# Patient Record
Sex: FEMALE | Race: WHITE | Employment: FULL TIME | URBAN - METROPOLITAN AREA
[De-identification: names, ages, dates, MRNs, and addresses within clinical notes are randomized per-mention and may not be internally consistent; named-entity substitution may affect disease eponyms.]

---

## 2018-06-28 DIAGNOSIS — R00.2 HEART PALPITATIONS: ICD-10-CM

## 2018-06-28 DIAGNOSIS — R00.0 SINUS TACHYCARDIA: ICD-10-CM

## 2018-06-28 RX ORDER — METOPROLOL SUCCINATE 25 MG/1
25 TABLET, EXTENDED RELEASE ORAL NIGHTLY
Qty: 90 TABLET | Refills: 3 | Status: SHIPPED | OUTPATIENT
Start: 2018-06-28 | End: 2018-09-11 | Stop reason: SDUPTHER

## 2018-08-07 ENCOUNTER — OFFICE VISIT (OUTPATIENT)
Dept: FAMILY MEDICINE CLINIC | Age: 43
End: 2018-08-07
Payer: COMMERCIAL

## 2018-08-07 VITALS
HEART RATE: 82 BPM | SYSTOLIC BLOOD PRESSURE: 108 MMHG | WEIGHT: 135.6 LBS | OXYGEN SATURATION: 99 % | DIASTOLIC BLOOD PRESSURE: 60 MMHG | BODY MASS INDEX: 24.02 KG/M2

## 2018-08-07 DIAGNOSIS — R53.82 CHRONIC FATIGUE: ICD-10-CM

## 2018-08-07 DIAGNOSIS — F51.01 PRIMARY INSOMNIA: Primary | ICD-10-CM

## 2018-08-07 DIAGNOSIS — H02.9 LESION OF UPPER EYELID: ICD-10-CM

## 2018-08-07 PROCEDURE — G8427 DOCREV CUR MEDS BY ELIG CLIN: HCPCS | Performed by: FAMILY MEDICINE

## 2018-08-07 PROCEDURE — 99213 OFFICE O/P EST LOW 20 MIN: CPT | Performed by: FAMILY MEDICINE

## 2018-08-07 PROCEDURE — G8420 CALC BMI NORM PARAMETERS: HCPCS | Performed by: FAMILY MEDICINE

## 2018-08-07 PROCEDURE — 4004F PT TOBACCO SCREEN RCVD TLK: CPT | Performed by: FAMILY MEDICINE

## 2018-08-07 RX ORDER — TRAZODONE HYDROCHLORIDE 300 MG/1
300 TABLET ORAL NIGHTLY
Qty: 90 TABLET | Refills: 5 | Status: SHIPPED | OUTPATIENT
Start: 2018-08-07 | End: 2018-11-12 | Stop reason: SDUPTHER

## 2018-08-07 ASSESSMENT — PATIENT HEALTH QUESTIONNAIRE - PHQ9
SUM OF ALL RESPONSES TO PHQ QUESTIONS 1-9: 0
2. FEELING DOWN, DEPRESSED OR HOPELESS: 0
SUM OF ALL RESPONSES TO PHQ9 QUESTIONS 1 & 2: 0
1. LITTLE INTEREST OR PLEASURE IN DOING THINGS: 0
SUM OF ALL RESPONSES TO PHQ QUESTIONS 1-9: 0
SUM OF ALL RESPONSES TO PHQ QUESTIONS 1-9: 0
SUM OF ALL RESPONSES TO PHQ9 QUESTIONS 1 & 2: 0
1. LITTLE INTEREST OR PLEASURE IN DOING THINGS: 0
2. FEELING DOWN, DEPRESSED OR HOPELESS: 0

## 2018-08-08 ASSESSMENT — ENCOUNTER SYMPTOMS
PHOTOPHOBIA: 0
EYE REDNESS: 0
SHORTNESS OF BREATH: 0
DIARRHEA: 0
BLOOD IN STOOL: 0
EYE ITCHING: 0
CONSTIPATION: 0
VOMITING: 0
CHEST TIGHTNESS: 0
CHOKING: 0
COLOR CHANGE: 0
COUGH: 0
EYE PAIN: 0
WHEEZING: 0
SINUS PRESSURE: 0
SORE THROAT: 0
RECTAL PAIN: 0
ABDOMINAL DISTENTION: 0
FACIAL SWELLING: 0
STRIDOR: 0
VOICE CHANGE: 0
RHINORRHEA: 0
APNEA: 0
TROUBLE SWALLOWING: 0
ANAL BLEEDING: 0
NAUSEA: 0
ABDOMINAL PAIN: 0
EYE DISCHARGE: 0
BACK PAIN: 0

## 2018-08-08 NOTE — PROGRESS NOTES
control/ protection: Injection     Other Topics Concern    Not on file     Social History Narrative    No narrative on file       Family History   Problem Relation Age of Onset    Migraines Mother     High Blood Pressure Mother     Migraines Son     Cancer Father     Heart Disease Father     Cancer Sister        Current Outpatient Prescriptions on File Prior to Visit   Medication Sig Dispense Refill    metoprolol succinate (TOPROL XL) 25 MG extended release tablet Take 1 tablet by mouth nightly 90 tablet 3    albuterol sulfate HFA (PROVENTIL HFA) 108 (90 Base) MCG/ACT inhaler Inhale 2 puffs into the lungs every 6 hours as needed for Wheezing 1 Inhaler 5    medroxyPROGESTERone (DEPO-PROVERA) 150 MG/ML injection Inject 150 mg into the muscle once. Every 12 weeks       No current facility-administered medications on file prior to visit. No Known Allergies    I have reviewed her allergies, medications, problem list, medical, social and family history and have updated as needed in the electronic medical record. Objective:     Physical Exam   Constitutional: She is oriented to person, place, and time. She appears well-developed and well-nourished. No distress. HENT:   Head: Normocephalic and atraumatic. Right Ear: External ear normal.   Left Ear: External ear normal.   Nose: Nose normal.   Mouth/Throat: Oropharynx is clear and moist. No oropharyngeal exudate. Eyes: Conjunctivae and EOM are normal. Pupils are equal, round, and reactive to light. Right eye exhibits no discharge. Left eye exhibits no discharge. No scleral icterus. Neck: Normal range of motion. Neck supple. No JVD present. No tracheal deviation present. No thyromegaly present. Cardiovascular: Normal rate, regular rhythm, normal heart sounds and intact distal pulses. Exam reveals no gallop and no friction rub. No murmur heard. Pulmonary/Chest: Effort normal and breath sounds normal. No stridor. No respiratory distress.  She

## 2018-08-29 ENCOUNTER — OFFICE VISIT (OUTPATIENT)
Dept: SURGERY | Age: 43
End: 2018-08-29
Payer: COMMERCIAL

## 2018-08-29 VITALS
BODY MASS INDEX: 23.92 KG/M2 | DIASTOLIC BLOOD PRESSURE: 70 MMHG | OXYGEN SATURATION: 99 % | HEIGHT: 63 IN | SYSTOLIC BLOOD PRESSURE: 110 MMHG | HEART RATE: 71 BPM | WEIGHT: 135 LBS

## 2018-08-29 DIAGNOSIS — H02.60 XANTHELASMA: Primary | ICD-10-CM

## 2018-08-29 PROCEDURE — 99204 OFFICE O/P NEW MOD 45 MIN: CPT | Performed by: PLASTIC SURGERY

## 2018-08-29 PROCEDURE — G8420 CALC BMI NORM PARAMETERS: HCPCS | Performed by: PLASTIC SURGERY

## 2018-08-29 PROCEDURE — 4004F PT TOBACCO SCREEN RCVD TLK: CPT | Performed by: PLASTIC SURGERY

## 2018-08-29 PROCEDURE — G8427 DOCREV CUR MEDS BY ELIG CLIN: HCPCS | Performed by: PLASTIC SURGERY

## 2018-08-29 NOTE — PROGRESS NOTES
damage to surrounding structures, fluid collections, asymmetry, and need for further procedures. All of Her questions were answered to their satisfaction and She agrees to proceed with the procedure. I have discussed with the patient that they should make every attempt to follow-up within this time interval in the event that the pathology or radiographic findings require further attention such as surgical or other medical intervention. They voiced complete understanding. The patient was educated to keep the bandage in place and apply bacitracin to the wound site BID. OK to shower at this time. Advised the patient that they can allow soap and water to rinse of the wound site while showering. Once they are done in the shower they are to pat dry the incision site with a clean paper towel. No baths, hot tubs or soaking of the wound site at this time. Pt voices understanding. Photos obtained    I attest that the patient was seen and examined by me, and concur with the documentation above. I agree with the assessment and the plan outlined. This document is generated, in part, by voice recognition software and thus  syntax and grammatical errors are possible.     Madison Arango  12:11 PM  8/29/2018

## 2018-09-06 ENCOUNTER — TELEPHONE (OUTPATIENT)
Dept: SURGERY | Age: 43
End: 2018-09-06

## 2018-09-07 ENCOUNTER — ANESTHESIA EVENT (OUTPATIENT)
Dept: OPERATING ROOM | Age: 43
End: 2018-09-07
Payer: COMMERCIAL

## 2018-09-07 ASSESSMENT — LIFESTYLE VARIABLES: SMOKING_STATUS: 1

## 2018-09-07 NOTE — ANESTHESIA PRE PROCEDURE
Department of Anesthesiology  Preprocedure Note       Name:  Jesus Ross   Age:  44 y.o.  :  1975                                          MRN:  [id number]         Date:  2018      Surgeon: Mabel Fermin):  Sheyla Deleon MD    Procedure: Procedure(s):  ER YAG LASER ABLATION RIGHT UPPER EYELID SYMPTOMATIC    Medications prior to admission:   Prior to Admission medications    Medication Sig Start Date End Date Taking? Authorizing Provider   traZODone (DESYREL) 300 MG tablet Take 1 tablet by mouth nightly 18   Emmy Penrodri, DO   metoprolol succinate (TOPROL XL) 25 MG extended release tablet Take 1 tablet by mouth nightly 18   Cheyenne Duran MD   albuterol sulfate HFA (PROVENTIL HFA) 108 (90 Base) MCG/ACT inhaler Inhale 2 puffs into the lungs every 6 hours as needed for Wheezing 11/3/17   Emmy Brenna, DO   medroxyPROGESTERone (DEPO-PROVERA) 150 MG/ML injection Inject 150 mg into the muscle once. Every 12 weeks    Historical Provider, MD       Current medications:    No current facility-administered medications for this encounter. Current Outpatient Prescriptions   Medication Sig Dispense Refill    traZODone (DESYREL) 300 MG tablet Take 1 tablet by mouth nightly 90 tablet 5    metoprolol succinate (TOPROL XL) 25 MG extended release tablet Take 1 tablet by mouth nightly 90 tablet 3    albuterol sulfate HFA (PROVENTIL HFA) 108 (90 Base) MCG/ACT inhaler Inhale 2 puffs into the lungs every 6 hours as needed for Wheezing 1 Inhaler 5    medroxyPROGESTERone (DEPO-PROVERA) 150 MG/ML injection Inject 150 mg into the muscle once.  Every 12 weeks         Allergies:  No Known Allergies    Problem List:    Patient Active Problem List   Diagnosis Code    Shoulder bursitis M75.50    Impingement syndrome of shoulder M75.40    Shoulder pain M25.519    Biceps tendonitis M75.20    Worsening headaches R51    Back pain M54.9    Chronic migraine G43.709    Protruded lumbar disc M51.26    DDD (degenerative disc disease), lumbar M51.36    Neural foraminal stenosis of lumbar spine M99.83    Degenerative Osteoarthritis of lumbar spine M47.816    Sacroiliitis  M46.1    Facet syndrome, lumbar (HCC) M46.96    Lumbar radiculopathy M54.16    Osteoarthritis of right hip M16.0    Acetabular labrum tear S73.199A    Lumbar spinal stenosis M48.061       Past Medical History:        Diagnosis Date    Bulging lumbar disc     Compression of nerve     Headache(784.0)     Insomnia     Tachycardia     takes metoprolol for irreg HR        Past Surgical History:        Procedure Laterality Date    BICEPS TENDON REPAIR Right     BREAST ENHANCEMENT SURGERY Bilateral     BREAST SURGERY  augmentation    KNEE ARTHROSCOPY  right    NERVE BLOCK Right 10 12 2015    lumbar right transforaminal nerve block #1 l4-5 l5-s1    NERVE BLOCK Right 10 22 2015    Transforaminal lumbar nerve block #2    NERVE BLOCK Right 11 02 2015    transforaminal nerve block right lumbar #3    NERVE BLOCK  11/11/15    sacroiliac joint right #1    NERVE BLOCK  11/18/15    sacroiliac joint right #2    NERVE BLOCK  11/25/15    sacroiliac joint right #3    NERVE BLOCK Right 1 18 15    hip inj #1    NERVE BLOCK Right 02/01/16    hip injection #2    NERVE BLOCK Bilateral 07/25/2016    Bilateral paravertebral facet lumbar #1    NERVE BLOCK Bilateral 08/17/2016    lumbar paravertebral facet #2    OK RECONSTRUCT PROX HUMERAL IMPLANT Right 5/14/2014    Arthroplasty, Shoulder    ROTATOR CUFF REPAIR Right        Social History:    Social History   Substance Use Topics    Smoking status: Current Every Day Smoker     Packs/day: 0.25     Years: 15.00     Types: Cigarettes    Smokeless tobacco: Never Used      Comment: 1 pack last a week    Alcohol use 0.0 oz/week      Comment: socially.  1 cup coffee per day                                Ready to quit: Not Answered  Counseling given: Not Answered      Vital Signs (Current):   Vitals: Rhythm: regular  Rate: normal           Beta Blocker:  Dose within 24 Hrs         Neuro/Psych:   (+) neuromuscular disease (lumbar radiculopathy):, headaches: migraine headaches,             GI/Hepatic/Renal:        (-) no morbid obesity       Endo/Other:    (+) : arthritis: OA., .                 Abdominal:         (-) obese     Vascular: negative vascular ROS. Anesthesia Plan      MAC     ASA 2       Induction: intravenous. MIPS: Postoperative opioids intended and Prophylactic antiemetics administered. Anesthetic plan and risks discussed with patient. Plan discussed with CRNA. PAT Chart Review:  Chart reviewed per routine on September 7, 2018 at 9:56 AM by Nel Sprague MD.  Above represents information available via shared medical record including previous anesthesia history, drug and allergy history.   Confirmation of above and final plan per Day of Surgery (DOS) anesthesiologist.      Nel Sprague MD   9/7/2018

## 2018-09-10 ENCOUNTER — ANESTHESIA (OUTPATIENT)
Dept: OPERATING ROOM | Age: 43
End: 2018-09-10
Payer: COMMERCIAL

## 2018-09-10 ENCOUNTER — HOSPITAL ENCOUNTER (OUTPATIENT)
Age: 43
Setting detail: OUTPATIENT SURGERY
Discharge: HOME | End: 2018-09-10
Attending: PLASTIC SURGERY | Admitting: PLASTIC SURGERY
Payer: COMMERCIAL

## 2018-09-10 VITALS
SYSTOLIC BLOOD PRESSURE: 100 MMHG | WEIGHT: 135 LBS | RESPIRATION RATE: 14 BRPM | BODY MASS INDEX: 23.92 KG/M2 | TEMPERATURE: 98 F | OXYGEN SATURATION: 99 % | HEART RATE: 68 BPM | HEIGHT: 63 IN | DIASTOLIC BLOOD PRESSURE: 64 MMHG

## 2018-09-10 VITALS
RESPIRATION RATE: 10 BRPM | SYSTOLIC BLOOD PRESSURE: 98 MMHG | OXYGEN SATURATION: 99 % | DIASTOLIC BLOOD PRESSURE: 50 MMHG

## 2018-09-10 LAB — PREGNANCY TEST URINE, POC: NORMAL

## 2018-09-10 PROCEDURE — 6370000000 HC RX 637 (ALT 250 FOR IP): Performed by: PLASTIC SURGERY

## 2018-09-10 PROCEDURE — 7100000010 HC PHASE II RECOVERY - FIRST 15 MIN: Performed by: PLASTIC SURGERY

## 2018-09-10 PROCEDURE — 6360000002 HC RX W HCPCS: Performed by: NURSE ANESTHETIST, CERTIFIED REGISTERED

## 2018-09-10 PROCEDURE — 7100000011 HC PHASE II RECOVERY - ADDTL 15 MIN: Performed by: PLASTIC SURGERY

## 2018-09-10 PROCEDURE — 2500000003 HC RX 250 WO HCPCS: Performed by: NURSE ANESTHETIST, CERTIFIED REGISTERED

## 2018-09-10 PROCEDURE — 81025 URINE PREGNANCY TEST: CPT | Performed by: PLASTIC SURGERY

## 2018-09-10 PROCEDURE — 2580000003 HC RX 258: Performed by: ANESTHESIOLOGY

## 2018-09-10 PROCEDURE — 2709999900 HC NON-CHARGEABLE SUPPLY: Performed by: PLASTIC SURGERY

## 2018-09-10 PROCEDURE — 3600000013 HC SURGERY LEVEL 3 ADDTL 15MIN: Performed by: PLASTIC SURGERY

## 2018-09-10 PROCEDURE — 3700000001 HC ADD 15 MINUTES (ANESTHESIA): Performed by: PLASTIC SURGERY

## 2018-09-10 PROCEDURE — 3600000003 HC SURGERY LEVEL 3 BASE: Performed by: PLASTIC SURGERY

## 2018-09-10 PROCEDURE — 3700000000 HC ANESTHESIA ATTENDED CARE: Performed by: PLASTIC SURGERY

## 2018-09-10 PROCEDURE — 2500000003 HC RX 250 WO HCPCS: Performed by: PLASTIC SURGERY

## 2018-09-10 PROCEDURE — 17110 DESTRUCTION B9 LES UP TO 14: CPT | Performed by: PLASTIC SURGERY

## 2018-09-10 RX ORDER — ONDANSETRON 2 MG/ML
INJECTION INTRAMUSCULAR; INTRAVENOUS PRN
Status: DISCONTINUED | OUTPATIENT
Start: 2018-09-10 | End: 2018-09-10 | Stop reason: SDUPTHER

## 2018-09-10 RX ORDER — LIDOCAINE HYDROCHLORIDE AND EPINEPHRINE 20; 5 MG/ML; UG/ML
INJECTION, SOLUTION EPIDURAL; INFILTRATION; INTRACAUDAL; PERINEURAL PRN
Status: DISCONTINUED | OUTPATIENT
Start: 2018-09-10 | End: 2018-09-10 | Stop reason: HOSPADM

## 2018-09-10 RX ORDER — BACITRACIN 500 [USP'U]/G
OINTMENT OPHTHALMIC PRN
Status: DISCONTINUED | OUTPATIENT
Start: 2018-09-10 | End: 2018-09-10 | Stop reason: HOSPADM

## 2018-09-10 RX ORDER — BACITRACIN 500 [USP'U]/G
OINTMENT OPHTHALMIC 3 TIMES DAILY
Qty: 1 TUBE | Refills: 2 | Status: SHIPPED | OUTPATIENT
Start: 2018-09-10 | End: 2018-09-20

## 2018-09-10 RX ORDER — MORPHINE SULFATE 2 MG/ML
1 INJECTION, SOLUTION INTRAMUSCULAR; INTRAVENOUS EVERY 5 MIN PRN
Status: DISCONTINUED | OUTPATIENT
Start: 2018-09-10 | End: 2018-09-10 | Stop reason: HOSPADM

## 2018-09-10 RX ORDER — FENTANYL CITRATE 50 UG/ML
50 INJECTION, SOLUTION INTRAMUSCULAR; INTRAVENOUS EVERY 5 MIN PRN
Status: DISCONTINUED | OUTPATIENT
Start: 2018-09-10 | End: 2018-09-10 | Stop reason: HOSPADM

## 2018-09-10 RX ORDER — SODIUM CHLORIDE, SODIUM LACTATE, POTASSIUM CHLORIDE, CALCIUM CHLORIDE 600; 310; 30; 20 MG/100ML; MG/100ML; MG/100ML; MG/100ML
INJECTION, SOLUTION INTRAVENOUS CONTINUOUS
Status: DISCONTINUED | OUTPATIENT
Start: 2018-09-10 | End: 2018-09-10 | Stop reason: HOSPADM

## 2018-09-10 RX ORDER — DIPHENHYDRAMINE HYDROCHLORIDE 50 MG/ML
12.5 INJECTION INTRAMUSCULAR; INTRAVENOUS
Status: DISCONTINUED | OUTPATIENT
Start: 2018-09-10 | End: 2018-09-10 | Stop reason: HOSPADM

## 2018-09-10 RX ORDER — TETRACAINE HYDROCHLORIDE 5 MG/ML
SOLUTION OPHTHALMIC PRN
Status: DISCONTINUED | OUTPATIENT
Start: 2018-09-10 | End: 2018-09-10 | Stop reason: HOSPADM

## 2018-09-10 RX ORDER — SODIUM CHLORIDE 0.9 % (FLUSH) 0.9 %
10 SYRINGE (ML) INJECTION PRN
Status: DISCONTINUED | OUTPATIENT
Start: 2018-09-10 | End: 2018-09-10 | Stop reason: HOSPADM

## 2018-09-10 RX ORDER — PROPOFOL 10 MG/ML
INJECTION, EMULSION INTRAVENOUS PRN
Status: DISCONTINUED | OUTPATIENT
Start: 2018-09-10 | End: 2018-09-10 | Stop reason: SDUPTHER

## 2018-09-10 RX ORDER — PROPOFOL 10 MG/ML
INJECTION, EMULSION INTRAVENOUS CONTINUOUS PRN
Status: DISCONTINUED | OUTPATIENT
Start: 2018-09-10 | End: 2018-09-10 | Stop reason: SDUPTHER

## 2018-09-10 RX ORDER — LABETALOL HYDROCHLORIDE 5 MG/ML
5 INJECTION, SOLUTION INTRAVENOUS EVERY 10 MIN PRN
Status: DISCONTINUED | OUTPATIENT
Start: 2018-09-10 | End: 2018-09-10 | Stop reason: HOSPADM

## 2018-09-10 RX ORDER — OXYCODONE HYDROCHLORIDE AND ACETAMINOPHEN 5; 325 MG/1; MG/1
1 TABLET ORAL EVERY 4 HOURS PRN
Status: DISCONTINUED | OUTPATIENT
Start: 2018-09-10 | End: 2018-09-10 | Stop reason: HOSPADM

## 2018-09-10 RX ORDER — HYDRALAZINE HYDROCHLORIDE 20 MG/ML
5 INJECTION INTRAMUSCULAR; INTRAVENOUS EVERY 10 MIN PRN
Status: DISCONTINUED | OUTPATIENT
Start: 2018-09-10 | End: 2018-09-10 | Stop reason: HOSPADM

## 2018-09-10 RX ORDER — FENTANYL CITRATE 50 UG/ML
INJECTION, SOLUTION INTRAMUSCULAR; INTRAVENOUS PRN
Status: DISCONTINUED | OUTPATIENT
Start: 2018-09-10 | End: 2018-09-10 | Stop reason: SDUPTHER

## 2018-09-10 RX ORDER — MEPERIDINE HYDROCHLORIDE 25 MG/ML
12.5 INJECTION INTRAMUSCULAR; INTRAVENOUS; SUBCUTANEOUS EVERY 5 MIN PRN
Status: DISCONTINUED | OUTPATIENT
Start: 2018-09-10 | End: 2018-09-10 | Stop reason: HOSPADM

## 2018-09-10 RX ORDER — PROMETHAZINE HYDROCHLORIDE 25 MG/ML
25 INJECTION, SOLUTION INTRAMUSCULAR; INTRAVENOUS
Status: DISCONTINUED | OUTPATIENT
Start: 2018-09-10 | End: 2018-09-10 | Stop reason: HOSPADM

## 2018-09-10 RX ORDER — MIDAZOLAM HYDROCHLORIDE 1 MG/ML
INJECTION INTRAMUSCULAR; INTRAVENOUS PRN
Status: DISCONTINUED | OUTPATIENT
Start: 2018-09-10 | End: 2018-09-10 | Stop reason: SDUPTHER

## 2018-09-10 RX ORDER — LIDOCAINE HYDROCHLORIDE 20 MG/ML
INJECTION, SOLUTION INFILTRATION; PERINEURAL PRN
Status: DISCONTINUED | OUTPATIENT
Start: 2018-09-10 | End: 2018-09-10 | Stop reason: SDUPTHER

## 2018-09-10 RX ORDER — SODIUM CHLORIDE 0.9 % (FLUSH) 0.9 %
10 SYRINGE (ML) INJECTION EVERY 12 HOURS SCHEDULED
Status: DISCONTINUED | OUTPATIENT
Start: 2018-09-10 | End: 2018-09-10 | Stop reason: HOSPADM

## 2018-09-10 RX ADMIN — SODIUM CHLORIDE, POTASSIUM CHLORIDE, SODIUM LACTATE AND CALCIUM CHLORIDE: 600; 310; 30; 20 INJECTION, SOLUTION INTRAVENOUS at 08:54

## 2018-09-10 RX ADMIN — FENTANYL CITRATE 50 MCG: 50 INJECTION, SOLUTION INTRAMUSCULAR; INTRAVENOUS at 10:25

## 2018-09-10 RX ADMIN — PROPOFOL 20 MG: 10 INJECTION, EMULSION INTRAVENOUS at 10:19

## 2018-09-10 RX ADMIN — MIDAZOLAM 2 MG: 1 INJECTION INTRAMUSCULAR; INTRAVENOUS at 10:18

## 2018-09-10 RX ADMIN — PROPOFOL 75 MCG/KG/MIN: 10 INJECTION, EMULSION INTRAVENOUS at 10:19

## 2018-09-10 RX ADMIN — LIDOCAINE HYDROCHLORIDE 5 MG: 20 INJECTION, SOLUTION INFILTRATION; PERINEURAL at 10:19

## 2018-09-10 RX ADMIN — FENTANYL CITRATE 50 MCG: 50 INJECTION, SOLUTION INTRAMUSCULAR; INTRAVENOUS at 10:19

## 2018-09-10 RX ADMIN — ONDANSETRON HYDROCHLORIDE 4 MG: 2 INJECTION, SOLUTION INTRAMUSCULAR; INTRAVENOUS at 10:18

## 2018-09-10 ASSESSMENT — PULMONARY FUNCTION TESTS
PIF_VALUE: 0

## 2018-09-10 ASSESSMENT — PAIN SCALES - GENERAL
PAINLEVEL_OUTOF10: 0

## 2018-09-10 ASSESSMENT — PAIN - FUNCTIONAL ASSESSMENT: PAIN_FUNCTIONAL_ASSESSMENT: 0-10

## 2018-09-10 NOTE — OP NOTE
[address]                                 OPERATIVE REPORT    PATIENT NAME: Rome Lipoma                    :        1975  MED REC NO:   [id number]                            ROOM:  ACCOUNT NO:   [de-identified]                           ADMIT DATE: 09/10/2018  PROVIDER:     Carmina Monk MD    DATE OF PROCEDURE:  09/10/2018    PREOPERATIVE DIAGNOSIS:  Right eyelid upper xanthelasma. POSTOPERATIVE DIAGNOSIS:  Right eyelid upper xanthelasma. PROCEDURE PERFORMED:  Erbium-YAG laser, destruction of right upper eyelids,  xanthelasma lesions measures 4 x 6 mm, margins are 1 mm defect, 6 x 8 mm,  final open wound 6 x 8 mm. ATTENDING SURGEON:  Neal Umanzor. Roxie Narvaez M.D.    Roberto Guillermo:  Thomas Montana PA-C. PA was required for the case due to  lack of adequately trained assistant; and was involved in prepping,  draping, retracting, dressing the patient. ANESTHESIA:  Monitored anesthesia care. PREOPERATIVE INDICATIONS:  The patient is a 69-year-old female with history  of a right upper eyelid xanthelasma causing heaviness to the upper eyelid. The patient elected to proceed with erbium-YAG destruction of the lesion. Risks, benefits, and alternatives were explained to the patient including  bleeding, scarring, infection, recurrence, and need for further surgery. She understood and elected to proceed. She was seen on the day of surgery,  marked, and all questions were answered. OPERATIVE PROCEDURE:  She was taken back to the operating room, placed in  the supine position. SCDs were on and functioning at the time of induction  of anesthesia. Preoperative antibiotics were given prior to incision. Laser safety protocols were in place after TetraVisc eye drops were placed  into the eyelids, ocular shields were then utilized. The area was then  infiltrated with 1% lidocaine with 1:100,000 epinephrine.

## 2018-09-11 ENCOUNTER — OFFICE VISIT (OUTPATIENT)
Dept: CARDIOLOGY CLINIC | Age: 43
End: 2018-09-11
Payer: COMMERCIAL

## 2018-09-11 VITALS
DIASTOLIC BLOOD PRESSURE: 60 MMHG | HEIGHT: 63 IN | WEIGHT: 139 LBS | BODY MASS INDEX: 24.63 KG/M2 | HEART RATE: 69 BPM | SYSTOLIC BLOOD PRESSURE: 124 MMHG

## 2018-09-11 DIAGNOSIS — Z86.79 HX OF SINUS TACHYCARDIA: ICD-10-CM

## 2018-09-11 DIAGNOSIS — Z72.0 TOBACCO USE: ICD-10-CM

## 2018-09-11 DIAGNOSIS — Z87.898 HISTORY OF PALPITATIONS: Primary | ICD-10-CM

## 2018-09-11 PROCEDURE — 99214 OFFICE O/P EST MOD 30 MIN: CPT | Performed by: INTERNAL MEDICINE

## 2018-09-11 PROCEDURE — G8427 DOCREV CUR MEDS BY ELIG CLIN: HCPCS | Performed by: INTERNAL MEDICINE

## 2018-09-11 PROCEDURE — 93000 ELECTROCARDIOGRAM COMPLETE: CPT | Performed by: INTERNAL MEDICINE

## 2018-09-11 PROCEDURE — 4004F PT TOBACCO SCREEN RCVD TLK: CPT | Performed by: INTERNAL MEDICINE

## 2018-09-11 PROCEDURE — G8420 CALC BMI NORM PARAMETERS: HCPCS | Performed by: INTERNAL MEDICINE

## 2018-09-11 RX ORDER — METOPROLOL SUCCINATE 25 MG/1
25 TABLET, EXTENDED RELEASE ORAL NIGHTLY
Qty: 30 TABLET | Refills: 11 | Status: SHIPPED | OUTPATIENT
Start: 2018-09-11 | End: 2018-11-02 | Stop reason: SDUPTHER

## 2018-09-11 NOTE — PROGRESS NOTES
OFFICE VISIT     PRIMARY CARE PHYSICIAN:      Juancarlos Staples DO       ALLERGIES / SENSITIVITIES:      No Known Allergies       REVIEWED MEDICATIONS:        Current Outpatient Prescriptions:     metoprolol succinate (TOPROL XL) 25 MG extended release tablet, Take 1 tablet by mouth nightly, Disp: 30 tablet, Rfl: 11    bacitracin 500 UNIT/GM ophthalmic ointment, Place into both eyes 3 times daily for 10 days Place over eyelid wound twice daily, Disp: 1 Tube, Rfl: 2    traZODone (DESYREL) 300 MG tablet, Take 1 tablet by mouth nightly, Disp: 90 tablet, Rfl: 5    albuterol sulfate HFA (PROVENTIL HFA) 108 (90 Base) MCG/ACT inhaler, Inhale 2 puffs into the lungs every 6 hours as needed for Wheezing, Disp: 1 Inhaler, Rfl: 5    medroxyPROGESTERone (DEPO-PROVERA) 150 MG/ML injection, Inject 150 mg into the muscle once. Every 12 weeks, Disp: , Rfl:       S: REASON FOR VISIT:       Chief Complaint   Patient presents with    Palpitations     9 month. Pt has no cardiac complaints today          History of Present Illness:       Office Visit for follow up of palpitations and Sinus tachycardia     Had \"Laser procedure done near right eye\"     Tye any exertional chest pain or short of breath   No palpitations, dizzy or syncope.    Active at home   No orthopnea   Try to watch diet   Compliant with all medications       Past Medical History:   Diagnosis Date    Bulging lumbar disc     Compression of nerve     Headache(784.0)     Insomnia     Tachycardia     takes metoprolol for irreg HR             Past Surgical History:   Procedure Laterality Date    BICEPS TENDON REPAIR Right     BREAST ENHANCEMENT SURGERY Bilateral     BREAST SURGERY  augmentation    KNEE ARTHROSCOPY  right    NERVE BLOCK Right 10 12 2015    lumbar right transforaminal nerve block #1 l4-5 l5-s1    NERVE BLOCK Right 10 22 2015    Transforaminal lumbar nerve block #2    NERVE BLOCK Right 11 02 2015    transforaminal nerve block right lumbar #3    NERVE BLOCK  11/11/15    sacroiliac joint right #1    NERVE BLOCK  11/18/15    sacroiliac joint right #2    NERVE BLOCK  11/25/15    sacroiliac joint right #3    NERVE BLOCK Right 1 18 15    hip inj #1    NERVE BLOCK Right 02/01/16    hip injection #2    NERVE BLOCK Bilateral 07/25/2016    Bilateral paravertebral facet lumbar #1    NERVE BLOCK Bilateral 08/17/2016    lumbar paravertebral facet #2    OTHER SURGICAL HISTORY Right 09/10/2018    laser upper eyelid    [place] OFFICE/OUTPT VISIT,PROCEDURE ONLY Right 9/10/2018    ER YAG LASER ABLATION RIGHT UPPER EYELID SYMPTOMATIC performed by Madison Arango MD at [facility] Right 5/14/2014    Arthroplasty, Shoulder    ROTATOR CUFF REPAIR Right           Family History   Problem Relation Age of Onset   [facility] Migraines Mother     High Blood Pressure Mother     Migraines Son     Cancer Father     Heart Disease Father     Cancer Sister           Social History   Substance Use Topics    Smoking status: Current Every Day Smoker     Packs/day: 0.25     Years: 15.00     Types: Cigarettes    Smokeless tobacco: Never Used      Comment: 1 pack last a week    Alcohol use 0.0 oz/week      Comment: socially.  1 cup coffee per day         Review of Systems:  HEENT: negative for acute visual symptoms or auditory problems, no dysphagia  Constitutional: negative for fever and chills, or significant weight loss  Respiratory: negative for cough, wheezing, or hemoptysis  Cardiovascular: negative for chest pain, palpitations, and dyspnea  Gastrointestinal: negative for abdominal pain, diarrhea, nausea and vomiting  Endocrine: Negative for polyuria and polydyspsia  Genitourinary:negative for dysuria and hematuria  Derm: negative for rash and skin lesion(s)  Neurological: negative for tingling, numbness, weakness, seizures and tremors  Endocrine: negative for polydipsia and polyuria  Musculoskeletal: negative for pain or tenderness  Psychiatric: negative for anxiety, depression, or suicidal ideations         O:  COMPLETE PHYSICAL EXAM:       /60   Pulse 69   Ht 5' 3\" (1.6 m)   Wt 139 lb (63 kg)   BMI 24.62 kg/m²       General:   Patient alert, comfortable, no distress. Appears stated age. HEENT:    Pupils equal, no icterus, no nasal drainage, tongue moist.Mild redness near inner canthus of right eye   Neck:              No masses, Thyroid not palpable. Chest:   Normal configuration, non tender. Lungs:   Clear to auscultation bilaterally, few scattered rhonchi. Cardiovascular:  Regular rhythm, No S3, , no palpable thrills, No elevated JVD, No carotid bruit. Abdomen:  Soft, Non tender, Bowel sounds normal,. Extremities:  No edema. Distal pulses palpable. No cyanosis, no clubbing. Skin:   Good turgor, warm and dry, no cyanosis. Musculoskeletal: No joint swelling or deformity. Neuro:   Cranial nerves grossly intact; No focal neurologic deficit. Psych:   Alert, good mood and effect. REVIEW OF DIAGNOSTIC TESTS:        Electrocardiogram: NSR., normal QTc interval              A/P:   ASSESSMENT / PLAN:    Ghulam Cui was seen today for palpitations. Diagnoses and all orders for this visit:    History of palpitations - Stable  -     EKG 12 Lead    Hx of sinus tachycardia  -Stable; try to wean off her BB; Try 12.5mg ONCE daily    Tobacco use  - 1/4 ppd; Counseled to quit smoking    Other orders  -     metoprolol succinate (TOPROL XL) 25 MG extended release tablet; Take 1 tablet by mouth nightly       Preventive Cardiology: Low cholesterol diet, regular exercise as tolerate, and gradual weight loss discussed. Monitor BP and heart rates. All questions answered about cardiac diagnoses and cardiac medications. Continue current medications. Compliance with medications and f/u with all physicians discussed. Risk factor modification based on risk profile discussed.    Call if any exertional chest pain, short of breath, dizzy or palpitations   Follow up in 9 months or earlier if needed.          [id number] [facility]  [address] [facility], [address] [facility]  [phone]

## 2018-09-24 ENCOUNTER — OFFICE VISIT (OUTPATIENT)
Dept: SURGERY | Age: 43
End: 2018-09-24

## 2018-09-24 VITALS
WEIGHT: 139 LBS | SYSTOLIC BLOOD PRESSURE: 106 MMHG | HEIGHT: 63 IN | BODY MASS INDEX: 24.63 KG/M2 | DIASTOLIC BLOOD PRESSURE: 68 MMHG

## 2018-09-24 DIAGNOSIS — H02.60 XANTHELASMA: Primary | ICD-10-CM

## 2018-09-24 PROCEDURE — 99024 POSTOP FOLLOW-UP VISIT: CPT | Performed by: PHYSICIAN ASSISTANT

## 2018-09-24 NOTE — PROGRESS NOTES
Subjective: Follow up today from  Erbium-YAG laser, destruction of right upper eyelids, xanthelasma. Denies fever, nausea, vomiting, leg pain or swelling, pain is absent. The pt states that they have  kept the wound site(s) covered with bacitracin. They state that their pain is absent. She voices no complaints at this time. Objective:    /68 (Site: Left Upper Arm, Position: Sitting, Cuff Size: Medium Adult)   Ht 5' 3\" (1.6 m)   Wt 139 lb (63 kg)   BMI 24.62 kg/m²       Wound: Clean, dry, and intact, no drainage. No signs of infection, wound healing well    Neuro- Sensation  intact to denny incisional site with light touch . Cranial nerves II-XII grossly intact.      Assessment:    Patient Active Problem List   Diagnosis    Shoulder bursitis    Impingement syndrome of shoulder    Shoulder pain    Biceps tendonitis    Worsening headaches    Back pain    Chronic migraine    Protruded lumbar disc    DDD (degenerative disc disease), lumbar    Neural foraminal stenosis of lumbar spine    Degenerative Osteoarthritis of lumbar spine    Sacroiliitis     Facet syndrome, lumbar ([address]    Lumbar radiculopathy    Osteoarthritis of right hip    Acetabular labrum tear    Lumbar spinal stenosis    Xanthelasma    Hx of sinus tachycardia       Labs: CBC:   Lab Results   Component Value Date    WBC 12.1 11/03/2017    RBC 4.51 11/03/2017    HGB 14.0 11/03/2017    HCT 41.6 11/03/2017    MCV 92.2 11/03/2017    MCH 31.0 11/03/2017    MCHC 33.7 11/03/2017    RDW 13.0 11/03/2017     11/03/2017    MPV 9.3 11/03/2017     BMP:    Lab Results   Component Value Date     11/03/2017    K 4.9 11/03/2017     11/03/2017    CO2 22 11/03/2017    BUN 12 11/03/2017    LABALBU 4.3 11/03/2017    LABALBU 4.3 09/13/2011    CREATININE 0.8 11/03/2017    CALCIUM 9.7 11/03/2017    GFRAA >60 11/03/2017    LABGLOM >60 11/03/2017    GLUCOSE 93 11/03/2017    GLUCOSE 90 09/13/2011         Pathology Report- None    Plan:       Dressing -None  Showering at this time -OK  No restrictions     Pt was instructed on scar massage  Scar Care Instructions      Sunscreen Recommendations for Scars   We recommend that all patients use a sunscreen when outside but especially on new scars (that are exposed to sunlight) for a year after their procedure.  The SPF should be at least 28. Follow the application directions on the bottle. Why is it so Important to Use Sunscreen on Scars?  A scar is new and more fragile than the surrounding skin.  If you do not use sunscreen, the scar line will react differently to the sun than the surrounding skin.  If you don't use sunscreen, the scar tissue will become darker than surrounding skin. This is a hyper-pigmented scar and will remain darker than the other skin.  After one year, the scar and surrounding skin should react equally to sun. Superficial Scar Massage   Scar massage desensitizes and reduces scar adhesions so skin glides freely.  Rub in a circular motion on and around the scar with firm, even pressure for 5 minutes four times per day    You can start scar massage once incision is completely healed and strong enough to handle the motion (usually 10 - 14 days post operatively).  You use lotion to do the scar massage to allow ease with motion over the scar and prevent friction at the area. Patient had no further questions. F/U PRN  Call office with concerns or signs of infection.     APRIL Nelson   1:16 PM  9/24/2018

## 2018-11-05 RX ORDER — METOPROLOL SUCCINATE 25 MG/1
25 TABLET, EXTENDED RELEASE ORAL NIGHTLY
Qty: 90 TABLET | Refills: 3 | Status: SHIPPED | OUTPATIENT
Start: 2018-11-05 | End: 2019-10-09 | Stop reason: SDUPTHER

## 2018-11-12 DIAGNOSIS — R53.82 CHRONIC FATIGUE: ICD-10-CM

## 2018-11-12 DIAGNOSIS — F51.01 PRIMARY INSOMNIA: ICD-10-CM

## 2018-11-14 RX ORDER — TRAZODONE HYDROCHLORIDE 300 MG/1
300 TABLET ORAL NIGHTLY
Qty: 90 TABLET | Refills: 5 | Status: SHIPPED | OUTPATIENT
Start: 2018-11-14 | End: 2019-02-07 | Stop reason: SDUPTHER

## 2019-02-07 ENCOUNTER — OFFICE VISIT (OUTPATIENT)
Dept: FAMILY MEDICINE CLINIC | Age: 44
End: 2019-02-07
Payer: COMMERCIAL

## 2019-02-07 VITALS
WEIGHT: 136 LBS | DIASTOLIC BLOOD PRESSURE: 78 MMHG | BODY MASS INDEX: 24.1 KG/M2 | OXYGEN SATURATION: 99 % | SYSTOLIC BLOOD PRESSURE: 120 MMHG | HEIGHT: 63 IN | HEART RATE: 71 BPM | RESPIRATION RATE: 18 BRPM

## 2019-02-07 DIAGNOSIS — R53.82 CHRONIC FATIGUE: ICD-10-CM

## 2019-02-07 DIAGNOSIS — F51.01 PRIMARY INSOMNIA: Primary | ICD-10-CM

## 2019-02-07 DIAGNOSIS — M25.362 INSTABILITY OF LEFT KNEE JOINT: ICD-10-CM

## 2019-02-07 DIAGNOSIS — M25.562 ACUTE PAIN OF LEFT KNEE: ICD-10-CM

## 2019-02-07 PROCEDURE — G8484 FLU IMMUNIZE NO ADMIN: HCPCS | Performed by: FAMILY MEDICINE

## 2019-02-07 PROCEDURE — 99213 OFFICE O/P EST LOW 20 MIN: CPT | Performed by: FAMILY MEDICINE

## 2019-02-07 PROCEDURE — G8427 DOCREV CUR MEDS BY ELIG CLIN: HCPCS | Performed by: FAMILY MEDICINE

## 2019-02-07 PROCEDURE — G8420 CALC BMI NORM PARAMETERS: HCPCS | Performed by: FAMILY MEDICINE

## 2019-02-07 PROCEDURE — 4004F PT TOBACCO SCREEN RCVD TLK: CPT | Performed by: FAMILY MEDICINE

## 2019-02-07 RX ORDER — TRAZODONE HYDROCHLORIDE 300 MG/1
300 TABLET ORAL NIGHTLY
Qty: 90 TABLET | Refills: 5 | Status: SHIPPED | OUTPATIENT
Start: 2019-02-07 | End: 2019-08-08

## 2019-02-07 ASSESSMENT — PATIENT HEALTH QUESTIONNAIRE - PHQ9
2. FEELING DOWN, DEPRESSED OR HOPELESS: 0
SUM OF ALL RESPONSES TO PHQ QUESTIONS 1-9: 0
2. FEELING DOWN, DEPRESSED OR HOPELESS: 0
SUM OF ALL RESPONSES TO PHQ9 QUESTIONS 1 & 2: 0
SUM OF ALL RESPONSES TO PHQ QUESTIONS 1-9: 0
SUM OF ALL RESPONSES TO PHQ QUESTIONS 1-9: 0
1. LITTLE INTEREST OR PLEASURE IN DOING THINGS: 0
SUM OF ALL RESPONSES TO PHQ QUESTIONS 1-9: 0
SUM OF ALL RESPONSES TO PHQ9 QUESTIONS 1 & 2: 0
1. LITTLE INTEREST OR PLEASURE IN DOING THINGS: 0

## 2019-02-12 DIAGNOSIS — M25.562 ACUTE PAIN OF LEFT KNEE: Primary | ICD-10-CM

## 2019-02-12 ASSESSMENT — ENCOUNTER SYMPTOMS
EYE PAIN: 0
DIARRHEA: 0
SINUS PRESSURE: 0
COUGH: 0
STRIDOR: 0
RHINORRHEA: 0
SORE THROAT: 0
APNEA: 0
BLOOD IN STOOL: 0
BACK PAIN: 0
CHEST TIGHTNESS: 0
RECTAL PAIN: 0
ANAL BLEEDING: 0
WHEEZING: 0
COLOR CHANGE: 0
FACIAL SWELLING: 0
CHOKING: 0
TROUBLE SWALLOWING: 0
CONSTIPATION: 0
ABDOMINAL DISTENTION: 0
SHORTNESS OF BREATH: 0
ABDOMINAL PAIN: 0
VOICE CHANGE: 0

## 2019-02-14 ENCOUNTER — OFFICE VISIT (OUTPATIENT)
Dept: ORTHOPEDIC SURGERY | Age: 44
End: 2019-02-14
Payer: COMMERCIAL

## 2019-02-14 VITALS — HEIGHT: 63 IN | WEIGHT: 135 LBS | BODY MASS INDEX: 23.92 KG/M2

## 2019-02-14 DIAGNOSIS — M76.52 PATELLAR TENDINITIS OF LEFT KNEE: Primary | ICD-10-CM

## 2019-02-14 PROCEDURE — 4004F PT TOBACCO SCREEN RCVD TLK: CPT | Performed by: ORTHOPAEDIC SURGERY

## 2019-02-14 PROCEDURE — 99204 OFFICE O/P NEW MOD 45 MIN: CPT | Performed by: ORTHOPAEDIC SURGERY

## 2019-02-14 PROCEDURE — G8484 FLU IMMUNIZE NO ADMIN: HCPCS | Performed by: ORTHOPAEDIC SURGERY

## 2019-02-14 PROCEDURE — G8427 DOCREV CUR MEDS BY ELIG CLIN: HCPCS | Performed by: ORTHOPAEDIC SURGERY

## 2019-02-14 PROCEDURE — G8420 CALC BMI NORM PARAMETERS: HCPCS | Performed by: ORTHOPAEDIC SURGERY

## 2019-02-14 RX ORDER — METHYLPREDNISOLONE 4 MG/1
4 TABLET ORAL SEE ADMIN INSTRUCTIONS
Qty: 1 KIT | Refills: 0 | Status: SHIPPED | OUTPATIENT
Start: 2019-02-14 | End: 2019-02-20

## 2019-02-14 RX ORDER — MELOXICAM 15 MG/1
15 TABLET ORAL DAILY
Qty: 30 TABLET | Refills: 3 | Status: SHIPPED | OUTPATIENT
Start: 2019-02-14 | End: 2019-05-02 | Stop reason: ALTCHOICE

## 2019-04-29 ENCOUNTER — INITIAL CONSULT (OUTPATIENT)
Dept: SURGERY | Age: 44
End: 2019-04-29
Payer: COMMERCIAL

## 2019-04-29 ENCOUNTER — TELEPHONE (OUTPATIENT)
Dept: SURGERY | Age: 44
End: 2019-04-29

## 2019-04-29 VITALS
TEMPERATURE: 98.1 F | HEIGHT: 63 IN | BODY MASS INDEX: 24.8 KG/M2 | HEART RATE: 70 BPM | SYSTOLIC BLOOD PRESSURE: 147 MMHG | WEIGHT: 140 LBS | DIASTOLIC BLOOD PRESSURE: 89 MMHG

## 2019-04-29 DIAGNOSIS — K64.1 GRADE II HEMORRHOIDS: Primary | ICD-10-CM

## 2019-04-29 PROCEDURE — G8427 DOCREV CUR MEDS BY ELIG CLIN: HCPCS | Performed by: SURGERY

## 2019-04-29 PROCEDURE — 99204 OFFICE O/P NEW MOD 45 MIN: CPT | Performed by: SURGERY

## 2019-04-29 PROCEDURE — 4004F PT TOBACCO SCREEN RCVD TLK: CPT | Performed by: SURGERY

## 2019-04-29 PROCEDURE — G8420 CALC BMI NORM PARAMETERS: HCPCS | Performed by: SURGERY

## 2019-04-29 NOTE — TELEPHONE ENCOUNTER
Per Munira Hernández, [facility] rep, no prior authorization is required for scheduled outpatient procedure cpt 65276.  Call ref#     Electronically signed by Juan Carlos Goldstein RN on 4/29/2019 at 2:59 PM

## 2019-04-29 NOTE — PROGRESS NOTES
day    Drug use: No    Sexual activity: Yes     Partners: Male     Birth control/protection: Injection   Lifestyle    Physical activity:     Days per week: Not on file     Minutes per session: Not on file    Stress: Not on file   Relationships    Social connections:     Talks on phone: Not on file     Gets together: Not on file     Attends [employer] service: Not on file     Active member of club or organization: Not on file     Attends meetings of clubs or organizations: Not on file     Relationship status: Not on file    Intimate partner violence:     Fear of current or ex partner: Not on file     Emotionally abused: Not on file     Physically abused: Not on file     Forced sexual activity: Not on file   Other Topics Concern    Not on file   Social History Narrative    Not on file           Review of Systems    A complete 10 system review was performed and are otherwise negative unless mentioned in the above HPI. Specific negatives are listed below but may not include all those reviewed.     General ROS: negative obtundation, AMS  ENT ROS: negative rhinorrhea, epistaxis  Allergy and Immunology ROS: negative itchy/watery eyes or nasal congestion  Hematological and Lymphatic ROS: negative spontaneous bleeding or bruising  Endocrine ROS: negative  lethargy, mood swings, palpitations or polydipsia/polyuria  Respiratory ROS: negative sputum changes, stridor, tachypnea or wheezing  Cardiovascular ROS: negative for - loss of consciousness, murmur or orthopnea  Gastrointestinal ROS: negative for -  hematemesis  Genito-Urinary ROS: negative for -  genital discharge or hematuria  Musculoskeletal ROS: negative for - focal weakness, gangrene  Psych/Neuro ROS: negative for - visual or auditory hallucinations, suicidal ideation    Physical exam:   BP (!) 147/89 (Site: Right Upper Arm, Position: Sitting, Cuff Size: Small Adult)   Pulse 70   Temp 98.1 °F (36.7 °C) (Oral)   Ht 5' 3\" (1.6 m)   Wt 140 lb (63.5 kg)   BMI 24.80 kg/m²   General appearance:  NAD, appears stated age  Head: NCAT, PERRLA, EOMI, red conjunctiva  Neck: supple, no masses, trachea midline  Lungs: Equal chest rise bilateral, no retractions, no wheezing  Heart: Reg rate  Abdomen: soft, nontender, nondistended  Skin; warm and dry, no cyanosis  Gu: no cva tenderness  Rectal: No bleeding, small external hemorrhoids, non thrombosed, painful TAMMY, no masses  Extremities: atraumatic, no focal motor deficits, no open wounds  Psych: No tremor, visual hallucinations      Radiology:  Radiology: I reviewed relevant abdominal imaging from this admission and that available in the EMR        Assessment:  Jayesh Albert is a 44 y.o. female with anal pain and rectal bleeding  Patient Active Problem List   Diagnosis    Shoulder bursitis    Impingement syndrome of shoulder    Shoulder pain    Biceps tendonitis    Worsening headaches    Back pain    Chronic migraine    Protruded lumbar disc    DDD (degenerative disc disease), lumbar    Neural foraminal stenosis of lumbar spine    Degenerative Osteoarthritis of lumbar spine    Sacroiliitis     Facet syndrome, lumbar    Lumbar radiculopathy    Osteoarthritis of right hip    Acetabular labrum tear    Lumbar spinal stenosis    Xanthelasma    Hx of sinus tachycardia         Plan:  High fiber diet  Anusol BID  OR for exam under anesthesia, hemorrhoidectomy  Discussed the risk, benefits and alternatives of surgery including wound infections, bleeding, recurrence, injury to surrounding structures, incontinence, pain and the risks of general anesthetic including MI, CVA, sudden death or reactions to anesthetic medications. The patient understands the risks and alternatives and the possibility of converting to an open procedure. All questions were answered to the patient's satisfaction and they freely signed the consent.              Sudarshan Overton MD  10:00 AM  4/29/2019

## 2019-05-02 RX ORDER — SODIUM CHLORIDE, SODIUM LACTATE, POTASSIUM CHLORIDE, CALCIUM CHLORIDE 600; 310; 30; 20 MG/100ML; MG/100ML; MG/100ML; MG/100ML
INJECTION, SOLUTION INTRAVENOUS CONTINUOUS
Status: CANCELLED | OUTPATIENT
Start: 2019-05-07

## 2019-05-03 ENCOUNTER — HOSPITAL ENCOUNTER (OUTPATIENT)
Dept: PREADMISSION TESTING | Age: 44
Discharge: HOME | End: 2019-05-03
Payer: COMMERCIAL

## 2019-05-03 VITALS
OXYGEN SATURATION: 98 % | HEART RATE: 77 BPM | HEIGHT: 63 IN | SYSTOLIC BLOOD PRESSURE: 100 MMHG | BODY MASS INDEX: 25.12 KG/M2 | RESPIRATION RATE: 16 BRPM | WEIGHT: 141.8 LBS | TEMPERATURE: 98.1 F | DIASTOLIC BLOOD PRESSURE: 68 MMHG

## 2019-05-03 DIAGNOSIS — Z01.818 ENCOUNTER FOR PREADMISSION TESTING: ICD-10-CM

## 2019-05-03 DIAGNOSIS — K64.9 HEMORRHOIDS, UNSPECIFIED HEMORRHOID TYPE: Primary | ICD-10-CM

## 2019-05-03 LAB
ANION GAP SERPL CALCULATED.3IONS-SCNC: 9 MMOL/L (ref 7–16)
BASOPHILS ABSOLUTE: 0.08 E9/L (ref 0–0.2)
BASOPHILS RELATIVE PERCENT: 0.6 % (ref 0–2)
BUN BLDV-MCNC: 12 MG/DL (ref 6–20)
CALCIUM SERPL-MCNC: 8.9 MG/DL (ref 8.6–10.2)
CHLORIDE BLD-SCNC: 107 MMOL/L (ref 98–107)
CO2: 25 MMOL/L (ref 22–29)
CREAT SERPL-MCNC: 0.7 MG/DL (ref 0.5–1)
EOSINOPHILS ABSOLUTE: 0.23 E9/L (ref 0.05–0.5)
EOSINOPHILS RELATIVE PERCENT: 1.8 % (ref 0–6)
GFR AFRICAN AMERICAN: >60
GFR NON-AFRICAN AMERICAN: >60 ML/MIN/1.73
GLUCOSE BLD-MCNC: 109 MG/DL (ref 74–99)
HCT VFR BLD CALC: 41.6 % (ref 34–48)
HEMOGLOBIN: 14.1 G/DL (ref 11.5–15.5)
IMMATURE GRANULOCYTES #: 0.06 E9/L
IMMATURE GRANULOCYTES %: 0.5 % (ref 0–5)
LYMPHOCYTES ABSOLUTE: 1.82 E9/L (ref 1.5–4)
LYMPHOCYTES RELATIVE PERCENT: 14.4 % (ref 20–42)
MCH RBC QN AUTO: 31.3 PG (ref 26–35)
MCHC RBC AUTO-ENTMCNC: 33.9 % (ref 32–34.5)
MCV RBC AUTO: 92.2 FL (ref 80–99.9)
MONOCYTES ABSOLUTE: 0.63 E9/L (ref 0.1–0.95)
MONOCYTES RELATIVE PERCENT: 5 % (ref 2–12)
NEUTROPHILS ABSOLUTE: 9.86 E9/L (ref 1.8–7.3)
NEUTROPHILS RELATIVE PERCENT: 77.7 % (ref 43–80)
PDW BLD-RTO: 13.3 FL (ref 11.5–15)
PLATELET # BLD: 308 E9/L (ref 130–450)
PMV BLD AUTO: 8.7 FL (ref 7–12)
POTASSIUM REFLEX MAGNESIUM: 4.4 MMOL/L (ref 3.5–5)
RBC # BLD: 4.51 E12/L (ref 3.5–5.5)
SODIUM BLD-SCNC: 141 MMOL/L (ref 132–146)
WBC # BLD: 12.8 E9/L (ref 4.5–11.5)

## 2019-05-03 PROCEDURE — 93005 ELECTROCARDIOGRAM TRACING: CPT | Performed by: ANESTHESIOLOGY

## 2019-05-03 PROCEDURE — 36415 COLL VENOUS BLD VENIPUNCTURE: CPT

## 2019-05-03 PROCEDURE — 85025 COMPLETE CBC W/AUTO DIFF WBC: CPT

## 2019-05-03 PROCEDURE — 80048 BASIC METABOLIC PNL TOTAL CA: CPT

## 2019-05-03 NOTE — PROGRESS NOTES
[address] [facility]                                                                                                                    PRE OP INSTRUCTIONS FOR  Cb Palencia        Date: 5/3/2019    Date of surgery: 5/7/19  Arrival Time: Hospital will call you between 5pm and 7pm with your final arrival time for surgery    1. Do not eat or drink anything after Midnight prior to surgery. This includes no water, chewing gum, mints or ice chips. 2. Take the following medications with a small sip of water on the morning of Surgery: None     3. Diabetics may take evening dose of insulin but none after midnight. If you feel symptomatic or low blood sugar morning of surgery drink 1-2 ounces of apple juice only. 4. Aspirin, Ibuprofen, Advil, Naproxen, Vitamin E and other Anti-inflammatory products should be stopped  before surgery  as directed by your physician. Take Tylenol only unless instructed otherwise by your surgeon. 5. Check with your Doctor regarding stopping Plavix, Coumadin, Lovenox, Eliquis, Effient, or other blood thinners. 6. Do not smoke,use illicit drugs and do not drink any alcoholic beverages 24 hours prior to surgery. 7. You may brush your teeth the morning of surgery. DO NOT SWALLOW WATER    8. You MUST make arrangements for a responsible adult to take you home after your surgery. You will not be allowed to leave alone or drive yourself home. It is strongly suggested someone stay with you the first 24 hrs. Your surgery will be cancelled if you do not have a ride home. 9. PEDIATRIC PATIENTS ONLY:  A parent/legal guardian must accompany a child scheduled for surgery and plan to stay at the hospital until the child is discharged. Please do not bring other children with you.     10. Please wear simple, loose fitting clothing to the hospital.  Christina Beards not bring valuables (money, credit cards, checkbooks, etc.) Do not wear any makeup (including no eye makeup) or nail

## 2019-05-05 LAB
EKG ATRIAL RATE: 74 BPM
EKG P AXIS: 61 DEGREES
EKG P-R INTERVAL: 120 MS
EKG Q-T INTERVAL: 392 MS
EKG QRS DURATION: 82 MS
EKG QTC CALCULATION (BAZETT): 435 MS
EKG R AXIS: 56 DEGREES
EKG T AXIS: 51 DEGREES
EKG VENTRICULAR RATE: 74 BPM

## 2019-05-07 ENCOUNTER — ANESTHESIA (OUTPATIENT)
Dept: OPERATING ROOM | Age: 44
End: 2019-05-07
Payer: COMMERCIAL

## 2019-05-07 ENCOUNTER — HOSPITAL ENCOUNTER (OUTPATIENT)
Age: 44
Setting detail: OUTPATIENT SURGERY
Discharge: HOME | End: 2019-05-07
Attending: SURGERY | Admitting: SURGERY
Payer: COMMERCIAL

## 2019-05-07 ENCOUNTER — ANESTHESIA EVENT (OUTPATIENT)
Dept: OPERATING ROOM | Age: 44
End: 2019-05-07
Payer: COMMERCIAL

## 2019-05-07 VITALS
HEART RATE: 69 BPM | SYSTOLIC BLOOD PRESSURE: 112 MMHG | DIASTOLIC BLOOD PRESSURE: 68 MMHG | TEMPERATURE: 97.6 F | OXYGEN SATURATION: 98 % | HEIGHT: 63 IN | BODY MASS INDEX: 25.16 KG/M2 | RESPIRATION RATE: 20 BRPM | WEIGHT: 142 LBS

## 2019-05-07 VITALS
SYSTOLIC BLOOD PRESSURE: 104 MMHG | OXYGEN SATURATION: 98 % | RESPIRATION RATE: 20 BRPM | DIASTOLIC BLOOD PRESSURE: 60 MMHG

## 2019-05-07 DIAGNOSIS — K64.1 GRADE II HEMORRHOIDS: Primary | ICD-10-CM

## 2019-05-07 LAB — HCG(URINE) PREGNANCY TEST: NEGATIVE

## 2019-05-07 PROCEDURE — 2709999900 HC NON-CHARGEABLE SUPPLY: Performed by: SURGERY

## 2019-05-07 PROCEDURE — 2500000003 HC RX 250 WO HCPCS: Performed by: SURGERY

## 2019-05-07 PROCEDURE — 3600000002 HC SURGERY LEVEL 2 BASE: Performed by: SURGERY

## 2019-05-07 PROCEDURE — 2720000010 HC SURG SUPPLY STERILE: Performed by: SURGERY

## 2019-05-07 PROCEDURE — 7100000010 HC PHASE II RECOVERY - FIRST 15 MIN: Performed by: SURGERY

## 2019-05-07 PROCEDURE — 3700000001 HC ADD 15 MINUTES (ANESTHESIA): Performed by: SURGERY

## 2019-05-07 PROCEDURE — 7100000011 HC PHASE II RECOVERY - ADDTL 15 MIN: Performed by: SURGERY

## 2019-05-07 PROCEDURE — 3600000012 HC SURGERY LEVEL 2 ADDTL 15MIN: Performed by: SURGERY

## 2019-05-07 PROCEDURE — 81025 URINE PREGNANCY TEST: CPT

## 2019-05-07 PROCEDURE — 46260 REMOVE IN/EX HEM GROUPS 2+: CPT | Performed by: SURGERY

## 2019-05-07 PROCEDURE — 6360000002 HC RX W HCPCS: Performed by: NURSE ANESTHETIST, CERTIFIED REGISTERED

## 2019-05-07 PROCEDURE — 6360000002 HC RX W HCPCS: Performed by: SURGERY

## 2019-05-07 PROCEDURE — 88304 TISSUE EXAM BY PATHOLOGIST: CPT

## 2019-05-07 PROCEDURE — 3700000000 HC ANESTHESIA ATTENDED CARE: Performed by: SURGERY

## 2019-05-07 PROCEDURE — 2580000003 HC RX 258: Performed by: NURSE ANESTHETIST, CERTIFIED REGISTERED

## 2019-05-07 PROCEDURE — 2580000003 HC RX 258: Performed by: SURGERY

## 2019-05-07 RX ORDER — SODIUM CHLORIDE 9 MG/ML
INJECTION, SOLUTION INTRAVENOUS CONTINUOUS PRN
Status: DISCONTINUED | OUTPATIENT
Start: 2019-05-07 | End: 2019-05-07 | Stop reason: SDUPTHER

## 2019-05-07 RX ORDER — PROPOFOL 10 MG/ML
INJECTION, EMULSION INTRAVENOUS CONTINUOUS PRN
Status: DISCONTINUED | OUTPATIENT
Start: 2019-05-07 | End: 2019-05-07 | Stop reason: SDUPTHER

## 2019-05-07 RX ORDER — SODIUM CHLORIDE 9 MG/ML
INJECTION, SOLUTION INTRAVENOUS CONTINUOUS
Status: DISCONTINUED | OUTPATIENT
Start: 2019-05-07 | End: 2019-05-07 | Stop reason: HOSPADM

## 2019-05-07 RX ORDER — FENTANYL CITRATE 50 UG/ML
INJECTION, SOLUTION INTRAMUSCULAR; INTRAVENOUS PRN
Status: DISCONTINUED | OUTPATIENT
Start: 2019-05-07 | End: 2019-05-07 | Stop reason: SDUPTHER

## 2019-05-07 RX ORDER — SODIUM CHLORIDE 0.9 % (FLUSH) 0.9 %
10 SYRINGE (ML) INJECTION EVERY 12 HOURS SCHEDULED
Status: DISCONTINUED | OUTPATIENT
Start: 2019-05-07 | End: 2019-05-07 | Stop reason: HOSPADM

## 2019-05-07 RX ORDER — SODIUM CHLORIDE 0.9 % (FLUSH) 0.9 %
10 SYRINGE (ML) INJECTION PRN
Status: DISCONTINUED | OUTPATIENT
Start: 2019-05-07 | End: 2019-05-07 | Stop reason: HOSPADM

## 2019-05-07 RX ORDER — HYDROMORPHONE HYDROCHLORIDE 1 MG/ML
0.25 INJECTION, SOLUTION INTRAMUSCULAR; INTRAVENOUS; SUBCUTANEOUS EVERY 5 MIN PRN
Status: DISCONTINUED | OUTPATIENT
Start: 2019-05-07 | End: 2019-05-07 | Stop reason: HOSPADM

## 2019-05-07 RX ORDER — IBUPROFEN 200 MG
800 TABLET ORAL EVERY 6 HOURS PRN
Qty: 90 TABLET | Refills: 0 | Status: SHIPPED | OUTPATIENT
Start: 2019-05-07 | End: 2020-10-02

## 2019-05-07 RX ORDER — KETOROLAC TROMETHAMINE 30 MG/ML
INJECTION, SOLUTION INTRAMUSCULAR; INTRAVENOUS PRN
Status: DISCONTINUED | OUTPATIENT
Start: 2019-05-07 | End: 2019-05-07 | Stop reason: SDUPTHER

## 2019-05-07 RX ORDER — MIDAZOLAM HYDROCHLORIDE 1 MG/ML
INJECTION INTRAMUSCULAR; INTRAVENOUS PRN
Status: DISCONTINUED | OUTPATIENT
Start: 2019-05-07 | End: 2019-05-07 | Stop reason: SDUPTHER

## 2019-05-07 RX ORDER — DOCUSATE SODIUM 100 MG/1
100 CAPSULE, LIQUID FILLED ORAL 2 TIMES DAILY
Qty: 30 CAPSULE | Refills: 0 | Status: SHIPPED | OUTPATIENT
Start: 2019-05-07 | End: 2019-05-22

## 2019-05-07 RX ORDER — OXYCODONE HYDROCHLORIDE AND ACETAMINOPHEN 5; 325 MG/1; MG/1
1 TABLET ORAL EVERY 6 HOURS PRN
Qty: 20 TABLET | Refills: 0 | Status: SHIPPED | OUTPATIENT
Start: 2019-05-07 | End: 2019-05-12

## 2019-05-07 RX ORDER — CEFAZOLIN SODIUM 2 G/50ML
2 SOLUTION INTRAVENOUS
Status: COMPLETED | OUTPATIENT
Start: 2019-05-07 | End: 2019-05-07

## 2019-05-07 RX ORDER — LIDOCAINE 50 MG/G
OINTMENT TOPICAL
Qty: 1 TUBE | Refills: 2 | Status: SHIPPED | OUTPATIENT
Start: 2019-05-07 | End: 2020-10-02

## 2019-05-07 RX ADMIN — FENTANYL CITRATE 25 MCG: 50 INJECTION, SOLUTION INTRAMUSCULAR; INTRAVENOUS at 08:00

## 2019-05-07 RX ADMIN — PROPOFOL 150 MCG/KG/MIN: 10 INJECTION, EMULSION INTRAVENOUS at 07:50

## 2019-05-07 RX ADMIN — KETOROLAC TROMETHAMINE 30 MG: 30 INJECTION, SOLUTION INTRAMUSCULAR; INTRAVENOUS at 08:07

## 2019-05-07 RX ADMIN — MIDAZOLAM 2 MG: 1 INJECTION INTRAMUSCULAR; INTRAVENOUS at 07:49

## 2019-05-07 RX ADMIN — FENTANYL CITRATE 25 MCG: 50 INJECTION, SOLUTION INTRAMUSCULAR; INTRAVENOUS at 07:57

## 2019-05-07 RX ADMIN — CEFAZOLIN SODIUM 2 G: 2 SOLUTION INTRAVENOUS at 07:54

## 2019-05-07 RX ADMIN — SODIUM CHLORIDE: 9 INJECTION, SOLUTION INTRAVENOUS at 07:49

## 2019-05-07 RX ADMIN — FENTANYL CITRATE 50 MCG: 50 INJECTION, SOLUTION INTRAMUSCULAR; INTRAVENOUS at 07:50

## 2019-05-07 RX ADMIN — SODIUM CHLORIDE: 9 INJECTION, SOLUTION INTRAVENOUS at 06:55

## 2019-05-07 ASSESSMENT — PULMONARY FUNCTION TESTS
PIF_VALUE: 1

## 2019-05-07 ASSESSMENT — PAIN SCALES - GENERAL
PAINLEVEL_OUTOF10: 0
PAINLEVEL_OUTOF10: 4

## 2019-05-07 ASSESSMENT — LIFESTYLE VARIABLES: SMOKING_STATUS: 1

## 2019-05-07 ASSESSMENT — PAIN DESCRIPTION - FREQUENCY: FREQUENCY: CONTINUOUS

## 2019-05-07 ASSESSMENT — PAIN DESCRIPTION - LOCATION: LOCATION: ABDOMEN

## 2019-05-07 ASSESSMENT — PAIN - FUNCTIONAL ASSESSMENT: PAIN_FUNCTIONAL_ASSESSMENT: 0-10

## 2019-05-07 ASSESSMENT — PAIN DESCRIPTION - DESCRIPTORS: DESCRIPTORS: CRAMPING

## 2019-05-07 ASSESSMENT — PAIN DESCRIPTION - PAIN TYPE: TYPE: SURGICAL PAIN

## 2019-05-07 NOTE — ANESTHESIA PRE PROCEDURE
Department of Anesthesiology  Preprocedure Note       Name:  Bailey Guillory   Age:  44 y.o.  :  1975                                          MRN:  [id number]         Date:  2019      Surgeon: Lázaro Ardon):  Cherelle Shanks MD    Procedure: Ilah Mole UNDER ANESTHESIA HEMORRHOIDECTOMY (N/A )    Medications prior to admission:   Prior to Admission medications    Medication Sig Start Date End Date Taking? Authorizing Provider   traZODone (DESYREL) 300 MG tablet Take 1 tablet by mouth nightly 19  Yes Jayme Mauricio,    metoprolol succinate (TOPROL XL) 25 MG extended release tablet Take 1 tablet by mouth nightly 18  Yes Lor Avery MD   albuterol sulfate HFA (PROVENTIL HFA) 108 (90 Base) MCG/ACT inhaler Inhale 2 puffs into the lungs every 6 hours as needed for Wheezing 11/3/17   Jayme Duongr, DO   medroxyPROGESTERone (DEPO-PROVERA) 150 MG/ML injection Inject 150 mg into the muscle once.  Every 12 weeks    Historical Provider, MD       Current medications:    Current Facility-Administered Medications   Medication Dose Route Frequency Provider Last Rate Last Dose    0.9 % sodium chloride infusion   Intravenous Continuous Cherelle Shanks MD        sodium chloride flush 0.9 % injection 10 mL  10 mL Intravenous 2 times per day Cherelle Shanks MD        sodium chloride flush 0.9 % injection 10 mL  10 mL Intravenous PRN Cherelle Shanks MD        ceFAZolin (ANCEF) 2 g in dextrose 3 % 50 mL IVPB (duplex)  2 g Intravenous On Call to Ryan Cosme MD           Allergies:  No Known Allergies    Problem List:    Patient Active Problem List   Diagnosis Code    Shoulder bursitis M75.50    Impingement syndrome of shoulder M75.40    Shoulder pain M25.519    Biceps tendonitis M75.20    Worsening headaches R51    Back pain M54.9    Chronic migraine G43.709    Protruded lumbar disc M51.26    DDD (degenerative disc disease), lumbar M51.36    Neural foraminal stenosis of lumbar spine M99.83    Degenerative Osteoarthritis of lumbar spine M47.816    Sacroiliitis  M46.1    Facet syndrome, lumbar M47.816    Lumbar radiculopathy M54.16    Osteoarthritis of right hip M16.0    Acetabular labrum tear S73.199A    Lumbar spinal stenosis M48.061    Xanthelasma H02.60    Hx of sinus tachycardia Z86.79       Past Medical History:        Diagnosis Date    Bulging lumbar disc     Compression of nerve     Headache(784.0)     Insomnia     Tachycardia     takes metoprolol for irreg HR        Past Surgical History:        Procedure Laterality Date    BICEPS TENDON REPAIR Right     BREAST ENHANCEMENT SURGERY Bilateral     BREAST SURGERY  augmentation    KNEE ARTHROSCOPY  right    NERVE BLOCK Right 10 12 2015    lumbar right transforaminal nerve block #1 l4-5 l5-s1    NERVE BLOCK Right 10 22 2015    Transforaminal lumbar nerve block #2    NERVE BLOCK Right 11 02 2015    transforaminal nerve block right lumbar #3    NERVE BLOCK  11/11/15    sacroiliac joint right #1    NERVE BLOCK  11/18/15    sacroiliac joint right #2    NERVE BLOCK  11/25/15    sacroiliac joint right #3    NERVE BLOCK Right 1 18 15    hip inj #1    NERVE BLOCK Right 02/01/16    hip injection #2    NERVE BLOCK Bilateral 07/25/2016    Bilateral paravertebral facet lumbar #1    NERVE BLOCK Bilateral 08/17/2016    lumbar paravertebral facet #2    OTHER SURGICAL HISTORY Right 09/10/2018    laser upper eyelid    [place] OFFICE/OUTPT VISIT,PROCEDURE ONLY Right 9/10/2018    ER YAG LASER ABLATION RIGHT UPPER EYELID SYMPTOMATIC performed by Abad Dukes MD at [address] [place] [address] Right 5/14/2014    Arthroplasty, Shoulder    ROTATOR CUFF REPAIR Right        Social History:    Social History     Tobacco Use    Smoking status: Current Every Day Smoker     Packs/day: 0.25     Years: 15.00     Pack years: 3.75     Types: Cigarettes    Smokeless tobacco: Never Used    Tobacco comment: 1 pack last a week   Substance Use Topics    Alcohol use: Yes     Alcohol/week: 0.0 oz                                Ready to quit: Not Answered  Counseling given: Not Answered  Comment: 1 pack last a week      Vital Signs (Current):   Vitals:    05/07/19 0640   BP: (!) 134/58   Pulse: 94   Resp: 16   Temp: 98.7 °F (37.1 °C)   TempSrc: Temporal   SpO2: 97%   Weight: 142 lb (64.4 kg)   Height: 5' 3\" (1.6 m)                                              BP Readings from Last 3 Encounters:   05/07/19 (!) 134/58   05/03/19 100/68   04/29/19 (!) 147/89       NPO Status: Time of last liquid consumption: 2230                        Time of last solid consumption: 1730                        Date of last liquid consumption: 05/06/19                        Date of last solid food consumption: 05/06/19    BMI:   Wt Readings from Last 3 Encounters:   05/07/19 142 lb (64.4 kg)   05/03/19 141 lb 12.8 oz (64.3 kg)   04/29/19 140 lb (63.5 kg)     Body mass index is 25.15 kg/m². CBC:   Lab Results   Component Value Date    WBC 12.8 05/03/2019    RBC 4.51 05/03/2019    HGB 14.1 05/03/2019    HCT 41.6 05/03/2019    MCV 92.2 05/03/2019    RDW 13.3 05/03/2019     05/03/2019       CMP:   Lab Results   Component Value Date     05/03/2019    K 4.4 05/03/2019     05/03/2019    CO2 25 05/03/2019    BUN 12 05/03/2019    CREATININE 0.7 05/03/2019    GFRAA >60 05/03/2019    LABGLOM >60 05/03/2019    GLUCOSE 109 05/03/2019    GLUCOSE 90 09/13/2011    PROT 7.4 11/03/2017    CALCIUM 8.9 05/03/2019    BILITOT 0.2 11/03/2017    ALKPHOS 56 11/03/2017    AST 16 11/03/2017    ALT 11 11/03/2017       POC Tests: No results for input(s): POCGLU, POCNA, POCK, POCCL, POCBUN, POCHEMO, POCHCT in the last 72 hours.     Coags:   Lab Results   Component Value Date    APTT 26.7 05/05/2014       HCG (If Applicable):   Lab Results   Component Value Date    PREGTESTUR neg 09/10/2018        ABGs: No results found for: PHART, PO2ART, NNG2MOA, BOE7NTU, BEART, W3DVMZEB     Type & Screen (If Applicable):  No results found for: LABABO, [address]    Anesthesia Evaluation  Patient summary reviewed  Airway: Mallampati: II  TM distance: >3 FB   Neck ROM: full  Mouth opening: > = 3 FB Dental:          Pulmonary: breath sounds clear to auscultation  (+) current smoker (0.25 PPD. )                           Cardiovascular:    (+) dysrhythmias: ventricular tachycardia,       ECG reviewed  Rhythm: regular  Rate: normal  Echocardiogram reviewed               ROS comment: EKG: Normal Sinus Rhythm 74. ECHO:  Left ventricular size, wall thickness, and wall motion normal, EF 65%.   Normal LV diastolic function.   Left atrium is of normal size.   Interatrial septum not well visualized but appears intact.   Normal right ventricle structure and function.   No mitral valve prolapse.   Normal aortic valve structure and function.   There is trace tricuspid regurgitation, RVSP 28mmHg.   Normal aortic root and ascending aorta.   No evidence of pericardial effusion.   No intracardiac mass. Neuro/Psych:   (+) neuromuscular disease:, headaches: migraine headaches,              ROS comment: Insomnia. GI/Hepatic/Renal: Neg GI/Hepatic/Renal ROS            Endo/Other:    (+) : arthritis:., .                 Abdominal:           Vascular: negative vascular ROS. Anesthesia Plan      general     ASA 3       Induction: intravenous. BIS  MIPS: Postoperative opioids intended. Anesthetic plan and risks discussed with patient. Plan discussed with CRNA.     Attending anesthesiologist reviewed and agrees with Niraj Pinto MD   5/7/2019

## 2019-05-07 NOTE — H&P
General Surgery History and Physical  [facility]    Patient's Name/Date of Birth: Dwayne Rosales / 1975    Date: May 7, 2019     Surgeon: Marylene Harrison, M.D.    PCP: Iraj Ballard DO     Chief Complaint: Rectal bleeding, anal pain    HPI:   Dwayne Rosales is a 44 y.o. female who presents for evaluation of rectal bleeding and anal pain. Timing is intermittent, radiation to anus, alleviated by abstaining from bowel movements, not eating and started several months ago and severity is 8/10. Patient has Had colonoscopy in last 5 years with Dr Mariangel Jules. Denies previous interventions for hemorrhoid treatment. Has tried fiber supplementation for greater than 6 weeks with little to no improvement. Has tried tucks pads, witch hazel and stool softeners with little to no improvement. No history of blood transfusion.     Patient Active Problem List   Diagnosis    Shoulder bursitis    Impingement syndrome of shoulder    Shoulder pain    Biceps tendonitis    Worsening headaches    Back pain    Chronic migraine    Protruded lumbar disc    DDD (degenerative disc disease), lumbar    Neural foraminal stenosis of lumbar spine    Degenerative Osteoarthritis of lumbar spine    Sacroiliitis     Facet syndrome, lumbar    Lumbar radiculopathy    Osteoarthritis of right hip    Acetabular labrum tear    Lumbar spinal stenosis    Xanthelasma    Hx of sinus tachycardia       Past Medical History:   Diagnosis Date    Bulging lumbar disc     Compression of nerve     Headache(784.0)     Insomnia     Tachycardia     takes metoprolol for irreg HR        Past Surgical History:   Procedure Laterality Date    BICEPS TENDON REPAIR Right     BREAST ENHANCEMENT SURGERY Bilateral     BREAST SURGERY  augmentation    KNEE ARTHROSCOPY  right    NERVE BLOCK Right 10 12 2015    lumbar right transforaminal nerve block #1 l4-5 l5-s1    NERVE BLOCK Right 10 22 2015    Transforaminal lumbar nerve block #2  NERVE BLOCK Right 11 02 2015    transforaminal nerve block right lumbar #3    NERVE BLOCK  11/11/15    sacroiliac joint right #1    NERVE BLOCK  11/18/15    sacroiliac joint right #2    NERVE BLOCK  11/25/15    sacroiliac joint right #3    NERVE BLOCK Right 1 18 15    hip inj #1    NERVE BLOCK Right 02/01/16    hip injection #2    NERVE BLOCK Bilateral 07/25/2016    Bilateral paravertebral facet lumbar #1    NERVE BLOCK Bilateral 08/17/2016    lumbar paravertebral facet #2    OTHER SURGICAL HISTORY Right 09/10/2018    laser upper eyelid    [place] OFFICE/OUTPT VISIT,PROCEDURE ONLY Right 9/10/2018    ER YAG LASER ABLATION RIGHT UPPER EYELID SYMPTOMATIC performed by Lamont Gray MD at [address] [place] [address] Right 5/14/2014    Arthroplasty, Shoulder    ROTATOR CUFF REPAIR Right        No Known Allergies    The patient has a family history that is negative for severe cardiovascular or respiratory issues, negative for reaction to anesthesia. Time spent reviewing past medical, surgical, social and family history, vitals, nursing assessment and images. No changes from above documented history. Social History     Socioeconomic History    Marital status:      Spouse name: Not on file    Number of children: Not on file    Years of education: Not on file    Highest education level: Not on file   Occupational History    Not on file   Social Needs    Financial resource strain: Not on file    Food insecurity:     Worry: Not on file     Inability: Not on file    Transportation needs:     Medical: Not on file     Non-medical: Not on file   Tobacco Use    Smoking status: Current Every Day Smoker     Packs/day: 0.25     Years: 15.00     Pack years: 3.75     Types: Cigarettes    Smokeless tobacco: Never Used    Tobacco comment: 1 pack last a week   Substance and Sexual Activity    Alcohol use:  Yes     Alcohol/week: 0.0 oz    Drug use: No    Sexual activity: Yes Partners: Male     Birth control/protection: Injection   Lifestyle    Physical activity:     Days per week: Not on file     Minutes per session: Not on file    Stress: Not on file   Relationships    Social connections:     Talks on phone: Not on file     Gets together: Not on file     Attends Zoroastrian service: Not on file     Active member of club or organization: Not on file     Attends meetings of clubs or organizations: Not on file     Relationship status: Not on file    Intimate partner violence:     Fear of current or ex partner: Not on file     Emotionally abused: Not on file     Physically abused: Not on file     Forced sexual activity: Not on file   Other Topics Concern    Not on file   Social History Narrative    Not on file           Review of Systems    A complete 10 system review was performed and are otherwise negative unless mentioned in the above HPI. Specific negatives are listed below but may not include all those reviewed.     General ROS: negative obtundation, AMS  ENT ROS: negative rhinorrhea, epistaxis  Allergy and Immunology ROS: negative itchy/watery eyes or nasal congestion  Hematological and Lymphatic ROS: negative spontaneous bleeding or bruising  Endocrine ROS: negative  lethargy, mood swings, palpitations or polydipsia/polyuria  Respiratory ROS: negative sputum changes, stridor, tachypnea or wheezing  Cardiovascular ROS: negative for - loss of consciousness, murmur or orthopnea  Gastrointestinal ROS: negative for -  hematemesis  Genito-Urinary ROS: negative for -  genital discharge or hematuria  Musculoskeletal ROS: negative for - focal weakness, gangrene  Psych/Neuro ROS: negative for - visual or auditory hallucinations, suicidal ideation    Physical exam:   BP (!) 134/58   Pulse 94   Temp 98.7 °F (37.1 °C) (Temporal)   Resp 16   Ht 5' 3\" (1.6 m)   Wt 142 lb (64.4 kg)   SpO2 97%   BMI 25.15 kg/m²   General appearance:  NAD, appears stated age  Head: NCAT, PERRLA, EOMI, red conjunctiva  Neck: supple, no masses, trachea midline  Lungs: Equal chest rise bilateral, no retractions, no wheezing  Heart: Reg rate  Abdomen: soft, nontender, nondistended  Skin; warm and dry, no cyanosis  Gu: no cva tenderness  Rectal: No bleeding, small external hemorrhoids, non thrombosed, painful TAMMY, no masses  Extremities: atraumatic, no focal motor deficits, no open wounds  Psych: No tremor, visual hallucinations      Radiology:  Radiology: I reviewed relevant abdominal imaging from this admission and that available in the EMR        Assessment:  Bailey Guillory is a 44 y.o. female with anal pain and rectal bleeding  Patient Active Problem List   Diagnosis    Shoulder bursitis    Impingement syndrome of shoulder    Shoulder pain    Biceps tendonitis    Worsening headaches    Back pain    Chronic migraine    Protruded lumbar disc    DDD (degenerative disc disease), lumbar    Neural foraminal stenosis of lumbar spine    Degenerative Osteoarthritis of lumbar spine    Sacroiliitis     Facet syndrome, lumbar    Lumbar radiculopathy    Osteoarthritis of right hip    Acetabular labrum tear    Lumbar spinal stenosis    Xanthelasma    Hx of sinus tachycardia         Plan:  High fiber diet  Anusol BID  OR for exam under anesthesia, hemorrhoidectomy  Discussed the risk, benefits and alternatives of surgery including wound infections, bleeding, recurrence, injury to surrounding structures, incontinence, pain and the risks of general anesthetic including MI, CVA, sudden death or reactions to anesthetic medications. The patient understands the risks and alternatives and the possibility of converting to an open procedure. All questions were answered to the patient's satisfaction and they freely signed the consent.              Cherelle Shanks MD  7:49 AM  5/7/2019

## 2019-05-07 NOTE — OP NOTE
DATE OF PROCEDURE: 5/7/2019     PREOPERATIVE DIAGNOSIS:  Anal pain and rectal bleeding.     POSTOPERATIVE DIAGNOSIS:  Grade III Internal hemorrhoids, External skin tags     PROCEDURE PERFORMED:  Exam under anesthesia with two-column hemorrhoidectomy and removal perianal skin tag     ANESTHESIA:  General endotracheal.     SURGEON:  Fransisco Brown MD.     ASSISTANT:  None     COMPLICATIONS:  None.     ESTIMATED BLOOD LOSS:  20 mL.     FLUIDS:  Crystalloid.     SPECIMEN:  Hemorrhoidal cushions.     DESCRIPTION OF PROCEDURE:  This is a 44 y.o. female with a history of  anal pain and rectal bleeding that has been recurrent. She complained of  intermittent fevers and constipation. After being explained risks, benefits, and alternatives to procedure, she agreed to proceed. She was taken to the operating room, placed supine, administered general anesthesia, turned to prone jackknife position, and prepped and draped in a normal sterile fashion. Time-out was performed to confirm surgery site and the patient's name. We initially performed a digital rectal exam identifying large hemorrhoidal cushions both in the right anterior and left lateral positions. Two separate areas of hemorrhoidal cushions in the sites were identified. They were grasped with right-angle clamp and harmonic scalpel was used to perform hemorrhoidectomy. Hemostasis was achieved and we then inspected further with electrocautery. There was one large perianal skin tag posteriorly that was removed with the harmonic. Once we were satisfied that the hemostasis was adequate, we inserted a dibucaine ladled gel plug into the anus in order for tamponade effect of hemostasis and local anesthetic application. Then we performed a bilateral pudendal block using 0.25% Marcaine with epinephrine. Sterile dressing was applied.   The patient was then awoken and turned to the supine position, extubated, and transferred to the postoperative care unit in stable condition.   All instrument counts, lap counts, and needle counts were correct at the completion of the procedure.     Ally Watkins MD, MS  Minimally Invasive and Bariatric Surgery  [phone] (p)  5/7/2019  8:12 AM

## 2019-05-07 NOTE — ANESTHESIA POSTPROCEDURE EVALUATION
Department of Anesthesiology  Postprocedure Note    Patient: Tessa Murphy  MRN: [id number]  YOB: 1975  Date of evaluation: 5/7/2019  Time:  8:32 AM     Procedure Summary     Date:  05/07/19 Room / Location:  [facility] 06 / [place]    Anesthesia Start:  8815 Anesthesia Stop:  Ivory Mole    Procedure:  EXAM UNDER ANESTHESIA HEMORRHOIDECTOMY (N/A Anus) Diagnosis:  (HEMORRHOIDS)    Surgeon:  Justyn Rosenbaum MD Responsible Provider:  Andre Ram MD    Anesthesia Type:  general ASA Status:  3          Anesthesia Type: general    Ghazala Phase I: Ghazala Score: 10    Ghazala Phase II:      Last vitals: Reviewed and per EMR flowsheets.        Anesthesia Post Evaluation    Patient location during evaluation: PACU  Patient participation: complete - patient participated  Level of consciousness: awake  Pain score: 0  Airway patency: patent  Nausea & Vomiting: no nausea  Complications: no  Cardiovascular status: blood pressure returned to baseline  Respiratory status: acceptable  Hydration status: euvolemic

## 2019-05-15 ENCOUNTER — OFFICE VISIT (OUTPATIENT)
Dept: SURGERY | Age: 44
End: 2019-05-15

## 2019-05-15 VITALS
WEIGHT: 142 LBS | SYSTOLIC BLOOD PRESSURE: 123 MMHG | HEART RATE: 75 BPM | TEMPERATURE: 98.5 F | BODY MASS INDEX: 25.16 KG/M2 | DIASTOLIC BLOOD PRESSURE: 76 MMHG | HEIGHT: 63 IN

## 2019-05-15 DIAGNOSIS — K64.1 GRADE II HEMORRHOIDS: Primary | ICD-10-CM

## 2019-05-15 PROCEDURE — 99024 POSTOP FOLLOW-UP VISIT: CPT | Performed by: SURGERY

## 2019-05-15 NOTE — PROGRESS NOTES
hemorrhoids.     Assessment/Plan:  Federica Hayden is a 44 y.o. female 2 weeks s/p hemorrhoidectomy    Cont stool softeners  Cont sitz baths  Follow up as needed  Motrin prn for pain    Physician Signature: Electronically signed by Dr. Jeff Valencia  [phone] (p)  5/15/2019  10:47 AM

## 2019-07-09 ENCOUNTER — OFFICE VISIT (OUTPATIENT)
Dept: CARDIOLOGY CLINIC | Age: 44
End: 2019-07-09
Payer: COMMERCIAL

## 2019-07-09 VITALS
HEART RATE: 80 BPM | DIASTOLIC BLOOD PRESSURE: 60 MMHG | HEIGHT: 63 IN | BODY MASS INDEX: 25.69 KG/M2 | WEIGHT: 145 LBS | SYSTOLIC BLOOD PRESSURE: 100 MMHG

## 2019-07-09 DIAGNOSIS — Z86.79 HX OF SINUS TACHYCARDIA: ICD-10-CM

## 2019-07-09 DIAGNOSIS — Z87.898 HISTORY OF PALPITATIONS: Primary | ICD-10-CM

## 2019-07-09 DIAGNOSIS — J45.20 MILD INTERMITTENT ASTHMA WITHOUT COMPLICATION: ICD-10-CM

## 2019-07-09 DIAGNOSIS — Z72.0 TOBACCO USE: ICD-10-CM

## 2019-07-09 PROCEDURE — G8427 DOCREV CUR MEDS BY ELIG CLIN: HCPCS | Performed by: INTERNAL MEDICINE

## 2019-07-09 PROCEDURE — 99213 OFFICE O/P EST LOW 20 MIN: CPT | Performed by: INTERNAL MEDICINE

## 2019-07-09 PROCEDURE — 4004F PT TOBACCO SCREEN RCVD TLK: CPT | Performed by: INTERNAL MEDICINE

## 2019-07-09 PROCEDURE — G8419 CALC BMI OUT NRM PARAM NOF/U: HCPCS | Performed by: INTERNAL MEDICINE

## 2019-08-08 ENCOUNTER — OFFICE VISIT (OUTPATIENT)
Dept: FAMILY MEDICINE CLINIC | Age: 44
End: 2019-08-08
Payer: COMMERCIAL

## 2019-08-08 VITALS
WEIGHT: 138 LBS | RESPIRATION RATE: 18 BRPM | BODY MASS INDEX: 24.45 KG/M2 | OXYGEN SATURATION: 98 % | SYSTOLIC BLOOD PRESSURE: 116 MMHG | HEIGHT: 63 IN | HEART RATE: 84 BPM | DIASTOLIC BLOOD PRESSURE: 80 MMHG

## 2019-08-08 DIAGNOSIS — F51.01 PRIMARY INSOMNIA: Primary | ICD-10-CM

## 2019-08-08 PROCEDURE — G8420 CALC BMI NORM PARAMETERS: HCPCS | Performed by: FAMILY MEDICINE

## 2019-08-08 PROCEDURE — G8427 DOCREV CUR MEDS BY ELIG CLIN: HCPCS | Performed by: FAMILY MEDICINE

## 2019-08-08 PROCEDURE — 99213 OFFICE O/P EST LOW 20 MIN: CPT | Performed by: FAMILY MEDICINE

## 2019-08-08 PROCEDURE — 4004F PT TOBACCO SCREEN RCVD TLK: CPT | Performed by: FAMILY MEDICINE

## 2019-08-08 RX ORDER — AMITRIPTYLINE HYDROCHLORIDE 100 MG/1
100 TABLET, FILM COATED ORAL NIGHTLY
Qty: 30 TABLET | Refills: 5 | Status: SHIPPED
Start: 2019-08-08 | End: 2020-10-02

## 2019-08-08 ASSESSMENT — PATIENT HEALTH QUESTIONNAIRE - PHQ9
1. LITTLE INTEREST OR PLEASURE IN DOING THINGS: 0
2. FEELING DOWN, DEPRESSED OR HOPELESS: 0
SUM OF ALL RESPONSES TO PHQ QUESTIONS 1-9: 0
SUM OF ALL RESPONSES TO PHQ9 QUESTIONS 1 & 2: 0
SUM OF ALL RESPONSES TO PHQ QUESTIONS 1-9: 0

## 2019-08-11 ASSESSMENT — ENCOUNTER SYMPTOMS
EYE DISCHARGE: 0
WHEEZING: 0
EYE ITCHING: 0
CHEST TIGHTNESS: 0
ABDOMINAL DISTENTION: 0
EYE REDNESS: 0
ANAL BLEEDING: 0
EYE PAIN: 0
NAUSEA: 0
VOICE CHANGE: 0
STRIDOR: 0
SHORTNESS OF BREATH: 0
FACIAL SWELLING: 0
VOMITING: 0
APNEA: 0
COLOR CHANGE: 0
BACK PAIN: 0
SORE THROAT: 0
CONSTIPATION: 0
ABDOMINAL PAIN: 0
CHOKING: 0
TROUBLE SWALLOWING: 0
DIARRHEA: 0
COUGH: 0
PHOTOPHOBIA: 0
RHINORRHEA: 0
BLOOD IN STOOL: 0
RECTAL PAIN: 0
SINUS PRESSURE: 0

## 2019-08-13 RX ORDER — HYDROXYZINE PAMOATE 50 MG/1
CAPSULE ORAL
Qty: 60 CAPSULE | Refills: 5 | Status: SHIPPED
Start: 2019-08-13 | End: 2020-10-02

## 2019-10-10 RX ORDER — METOPROLOL SUCCINATE 25 MG/1
25 TABLET, EXTENDED RELEASE ORAL NIGHTLY
Qty: 90 TABLET | Refills: 3 | Status: SHIPPED
Start: 2019-10-10 | End: 2020-11-09

## 2019-10-25 DIAGNOSIS — F51.01 PRIMARY INSOMNIA: Primary | ICD-10-CM

## 2019-10-25 DIAGNOSIS — G25.81 RESTLESS LEG SYNDROME: ICD-10-CM

## 2019-10-25 RX ORDER — RAMELTEON 8 MG/1
8 TABLET ORAL NIGHTLY
Qty: 30 TABLET | Refills: 5 | Status: SHIPPED | OUTPATIENT
Start: 2019-10-25 | End: 2019-11-24

## 2020-10-02 ENCOUNTER — OFFICE VISIT (OUTPATIENT)
Dept: CARDIOLOGY CLINIC | Age: 45
End: 2020-10-02
Payer: COMMERCIAL

## 2020-10-02 VITALS
WEIGHT: 143 LBS | SYSTOLIC BLOOD PRESSURE: 130 MMHG | HEART RATE: 76 BPM | BODY MASS INDEX: 25.34 KG/M2 | DIASTOLIC BLOOD PRESSURE: 60 MMHG | HEIGHT: 63 IN

## 2020-10-02 PROCEDURE — 93000 ELECTROCARDIOGRAM COMPLETE: CPT | Performed by: INTERNAL MEDICINE

## 2020-10-02 PROCEDURE — 99213 OFFICE O/P EST LOW 20 MIN: CPT | Performed by: INTERNAL MEDICINE

## 2020-10-02 NOTE — PROGRESS NOTES
OFFICE VISIT     PRIMARY CARE PHYSICIAN:      Chico Ulrich DO       ALLERGIES / SENSITIVITIES:      No Known Allergies       REVIEWED MEDICATIONS:        Current Outpatient Medications:     metoprolol succinate (TOPROL XL) 25 MG extended release tablet, TAKE 1 TABLET BY MOUTH  NIGHTLY, Disp: 90 tablet, Rfl: 3    albuterol sulfate HFA (PROVENTIL HFA) 108 (90 Base) MCG/ACT inhaler, Inhale 2 puffs into the lungs every 6 hours as needed for Wheezing, Disp: 1 Inhaler, Rfl: 5    medroxyPROGESTERone (DEPO-PROVERA) 150 MG/ML injection, Inject 150 mg into the muscle once. Every 12 weeks, Disp: , Rfl:       S: REASON FOR VISIT:       Chief Complaint   Patient presents with    Palpitations     Annual. Pt has no cardiac complaints today          History of Present Illness:       Office Visit for follow up of Tachycardia, Heart racing   41 yr Antony Herrera with history of  Heart palpitations, tachycardia came for f/u viisit   Still smokes 1/2 ppd   No hospitalizations or surgeries since last visit   Patient is compliant with all medications   Tye any exertional chest pain or short of breath   No palpitations, dizzy or syncope.    Active at home   No orthopnea   Try to watch diet          Past Medical History:   Diagnosis Date    Bulging lumbar disc     Compression of nerve     Headache(784.0)     Insomnia     Tachycardia     takes metoprolol for irreg HR             Past Surgical History:   Procedure Laterality Date    BICEPS TENDON REPAIR Right     BREAST ENHANCEMENT SURGERY Bilateral     BREAST SURGERY  augmentation    HEMORROIDECTOMY N/A 5/7/2019    EXAM UNDER ANESTHESIA HEMORRHOIDECTOMY performed by Jessie Ash MD at [facility] [address] ARTHROSCOPY  right    NERVE BLOCK Right 10 12 2015    lumbar right transforaminal nerve block #1 l4-5 l5-s1    NERVE BLOCK Right 10 22 2015    Transforaminal lumbar nerve block #2    NERVE BLOCK Right 11 02 2015    transforaminal nerve block right lumbar #3    NERVE BLOCK

## 2020-11-09 RX ORDER — METOPROLOL SUCCINATE 25 MG/1
25 TABLET, EXTENDED RELEASE ORAL NIGHTLY
Qty: 90 TABLET | Refills: 3 | Status: SHIPPED | OUTPATIENT
Start: 2020-11-09

## 2021-03-30 ENCOUNTER — IMMUNIZATION (OUTPATIENT)
Dept: PRIMARY CARE CLINIC | Age: 46
End: 2021-03-30
Payer: COMMERCIAL

## 2021-03-30 ENCOUNTER — OFFICE VISIT (OUTPATIENT)
Dept: FAMILY MEDICINE CLINIC | Age: 46
End: 2021-03-30
Payer: COMMERCIAL

## 2021-03-30 VITALS
HEIGHT: 63 IN | WEIGHT: 142 LBS | SYSTOLIC BLOOD PRESSURE: 120 MMHG | RESPIRATION RATE: 20 BRPM | BODY MASS INDEX: 25.16 KG/M2 | TEMPERATURE: 97.3 F | OXYGEN SATURATION: 97 % | HEART RATE: 80 BPM | DIASTOLIC BLOOD PRESSURE: 70 MMHG

## 2021-03-30 DIAGNOSIS — M25.552 LEFT HIP PAIN: ICD-10-CM

## 2021-03-30 DIAGNOSIS — M51.16 LUMBAR DISC DISEASE WITH RADICULOPATHY: Primary | ICD-10-CM

## 2021-03-30 DIAGNOSIS — M25.551 RIGHT HIP PAIN: ICD-10-CM

## 2021-03-30 PROCEDURE — 99213 OFFICE O/P EST LOW 20 MIN: CPT | Performed by: FAMILY MEDICINE

## 2021-03-30 PROCEDURE — G8484 FLU IMMUNIZE NO ADMIN: HCPCS | Performed by: FAMILY MEDICINE

## 2021-03-30 PROCEDURE — G8419 CALC BMI OUT NRM PARAM NOF/U: HCPCS | Performed by: FAMILY MEDICINE

## 2021-03-30 PROCEDURE — 0011A COVID-19, MODERNA VACCINE 100MCG/0.5ML DOSE: CPT | Performed by: NURSE PRACTITIONER

## 2021-03-30 PROCEDURE — G8427 DOCREV CUR MEDS BY ELIG CLIN: HCPCS | Performed by: FAMILY MEDICINE

## 2021-03-30 PROCEDURE — 91301 COVID-19, MODERNA VACCINE 100MCG/0.5ML DOSE: CPT | Performed by: NURSE PRACTITIONER

## 2021-03-30 PROCEDURE — 4004F PT TOBACCO SCREEN RCVD TLK: CPT | Performed by: FAMILY MEDICINE

## 2021-03-30 RX ORDER — NABUMETONE 750 MG/1
750 TABLET, FILM COATED ORAL 2 TIMES DAILY
Qty: 60 TABLET | Refills: 5 | Status: SHIPPED
Start: 2021-03-30 | End: 2021-06-14

## 2021-03-30 RX ORDER — TIZANIDINE 4 MG/1
4 TABLET ORAL NIGHTLY
Qty: 30 TABLET | Refills: 5 | Status: SHIPPED
Start: 2021-03-30 | End: 2021-06-14

## 2021-03-30 RX ORDER — METAXALONE 800 MG/1
800 TABLET ORAL 3 TIMES DAILY
Qty: 90 TABLET | Refills: 5 | Status: SHIPPED | OUTPATIENT
Start: 2021-03-30 | End: 2021-04-29

## 2021-03-30 ASSESSMENT — PATIENT HEALTH QUESTIONNAIRE - PHQ9
1. LITTLE INTEREST OR PLEASURE IN DOING THINGS: 0
SUM OF ALL RESPONSES TO PHQ QUESTIONS 1-9: 0
SUM OF ALL RESPONSES TO PHQ QUESTIONS 1-9: 0

## 2021-04-01 ASSESSMENT — ENCOUNTER SYMPTOMS
CONSTIPATION: 0
DIARRHEA: 0
EYE DISCHARGE: 0
SORE THROAT: 0
EYE PAIN: 0
CHEST TIGHTNESS: 0
BACK PAIN: 1
ABDOMINAL PAIN: 0
EYE REDNESS: 0
SHORTNESS OF BREATH: 0
NAUSEA: 0
ABDOMINAL DISTENTION: 0
ANAL BLEEDING: 0
BLOOD IN STOOL: 0
WHEEZING: 0
CHOKING: 0
VOMITING: 0
VOICE CHANGE: 0
COUGH: 0
RHINORRHEA: 0
APNEA: 0
FACIAL SWELLING: 0
COLOR CHANGE: 0
EYE ITCHING: 0
SINUS PRESSURE: 0
RECTAL PAIN: 0
PHOTOPHOBIA: 0
STRIDOR: 0
TROUBLE SWALLOWING: 0

## 2021-04-01 NOTE — PROGRESS NOTES
Adriel Caicedo is a 41 y.o. female  . Subjective:      Fatigue  This is a chronic problem. The current episode started more than 1 year ago. The problem occurs daily. The problem has been waxing and waning. Associated symptoms include arthralgias, fatigue, joint swelling and myalgias. Pertinent negatives include no abdominal pain, chest pain, chills, congestion, coughing, diaphoresis, fever, headaches, nausea, neck pain, numbness, rash, sore throat, vomiting or weakness. Exacerbated by: insomnia. She has tried nothing for the symptoms. The treatment provided no relief. Hip Pain   The incident occurred more than 1 week ago. The pain is present in the right hip and left hip. The quality of the pain is described as aching, burning and cramping. The pain is at a severity of 6/10. The pain is moderate. The pain has been worsening since onset. Pertinent negatives include no numbness. Nothing aggravates the symptoms. She has tried NSAIDs for the symptoms. The treatment provided mild relief. Back Pain  This is a chronic problem. The current episode started more than 1 year ago. The problem occurs daily. The problem has been waxing and waning since onset. The pain is present in the lumbar spine and sacro-iliac. The quality of the pain is described as aching and burning. The pain radiates to the left knee, left thigh, right knee and right thigh. The pain is at a severity of 7/10. The pain is moderate. The pain is the same all the time. The symptoms are aggravated by bending, position, standing and twisting. Stiffness is present all day. Pertinent negatives include no abdominal pain, chest pain, dysuria, fever, headaches, numbness, pelvic pain or weakness. She has tried NSAIDs (injections) for the symptoms. The treatment provided no relief. Review of Systems   Constitutional: Positive for fatigue. Negative for activity change, appetite change, chills, diaphoresis, fever and unexpected weight change.    HENT: Negative for congestion, dental problem, drooling, ear discharge, ear pain, facial swelling, hearing loss, mouth sores, nosebleeds, postnasal drip, rhinorrhea, sinus pressure, sneezing, sore throat, tinnitus, trouble swallowing and voice change. Eyes: Negative for photophobia, pain, discharge, redness, itching and visual disturbance. Respiratory: Negative for apnea, cough, choking, chest tightness, shortness of breath, wheezing and stridor. Cardiovascular: Negative for chest pain, palpitations and leg swelling. Gastrointestinal: Negative for abdominal distention, abdominal pain, anal bleeding, blood in stool, constipation, diarrhea, nausea, rectal pain and vomiting. Endocrine: Negative for cold intolerance, heat intolerance, polydipsia, polyphagia and polyuria. Genitourinary: Negative for decreased urine volume, difficulty urinating, dyspareunia, dysuria, enuresis, flank pain, frequency, genital sores, hematuria, menstrual problem, pelvic pain, urgency, vaginal bleeding, vaginal discharge and vaginal pain. Musculoskeletal: Positive for arthralgias, back pain, gait problem, joint swelling and myalgias. Negative for neck pain and neck stiffness. Skin: Negative for color change, pallor, rash and wound. Allergic/Immunologic: Negative for environmental allergies, food allergies and immunocompromised state. Neurological: Negative for dizziness, tremors, seizures, syncope, facial asymmetry, speech difficulty, weakness, light-headedness, numbness and headaches. Hematological: Negative for adenopathy. Does not bruise/bleed easily. Psychiatric/Behavioral: Positive for sleep disturbance. Negative for agitation, behavioral problems, confusion, decreased concentration, dysphoric mood, hallucinations, self-injury and suicidal ideas. The patient is not nervous/anxious and is not hyperactive.         Past Medical History:   Diagnosis Date    Bulging lumbar disc     Compression of nerve     Headache(784.0)     Insomnia     Tachycardia     takes metoprolol for irreg HR        Social History     Socioeconomic History    Marital status:      Spouse name: Not on file    Number of children: Not on file    Years of education: Not on file    Highest education level: Not on file   Occupational History    Not on file   Social Needs    Financial resource strain: Not on file    Food insecurity     Worry: Not on file     Inability: Not on file    Transportation needs     Medical: Not on file     Non-medical: Not on file   Tobacco Use    Smoking status: Current Every Day Smoker     Packs/day: 1.00     Years: 15.00     Pack years: 15.00     Types: Cigarettes    Smokeless tobacco: Never Used   Substance and Sexual Activity    Alcohol use:  Yes     Alcohol/week: 0.0 standard drinks     Comment: 1 coffee per day    Drug use: No    Sexual activity: Yes     Partners: Male     Birth control/protection: Injection   Lifestyle    Physical activity     Days per week: Not on file     Minutes per session: Not on file    Stress: Not on file   Relationships    Social connections     Talks on phone: Not on file     Gets together: Not on file     Attends [employer] service: Not on file     Active member of club or organization: Not on file     Attends meetings of clubs or organizations: Not on file     Relationship status: Not on file    Intimate partner violence     Fear of current or ex partner: Not on file     Emotionally abused: Not on file     Physically abused: Not on file     Forced sexual activity: Not on file   Other Topics Concern    Not on file   Social History Narrative    Not on file       Family History   Problem Relation Age of Onset    Migraines Mother     High Blood Pressure Mother     Migraines Son     Cancer Father     Heart Disease Father     Cancer Sister        Current Outpatient Medications on File Prior to Visit   Medication Sig Dispense Refill    metoprolol succinate (TOPROL XL) 25 MG extended release tablet TAKE 1 TABLET BY MOUTH NIGHTLY 90 tablet 3    albuterol sulfate HFA (PROVENTIL HFA) 108 (90 Base) MCG/ACT inhaler Inhale 2 puffs into the lungs every 6 hours as needed for Wheezing 1 Inhaler 5    medroxyPROGESTERone (DEPO-PROVERA) 150 MG/ML injection Inject 150 mg into the muscle once. Every 12 weeks       No current facility-administered medications on file prior to visit. No Known Allergies    I have reviewedher allergies, medications, problem list, medical, social and family history and have updated as needed in the electronic medical record. Objective:     Physical Exam  Vitals signs and nursing note reviewed. Constitutional:       General: She is not in acute distress. Appearance: She is well-developed. She is not diaphoretic. HENT:      Head: Normocephalic and atraumatic. Right Ear: External ear normal.      Left Ear: External ear normal.      Nose: Nose normal.      Mouth/Throat:      Pharynx: No oropharyngeal exudate. Eyes:      General: No scleral icterus. Right eye: No discharge. Left eye: No discharge. Conjunctiva/sclera: Conjunctivae normal.      Pupils: Pupils are equal, round, and reactive to light. Neck:      Musculoskeletal: Normal range of motion and neck supple. Thyroid: No thyromegaly. Vascular: No JVD. Trachea: No tracheal deviation. Cardiovascular:      Rate and Rhythm: Normal rate and regular rhythm. Heart sounds: Normal heart sounds. No murmur. No friction rub. No gallop. Pulmonary:      Effort: Pulmonary effort is normal. No respiratory distress. Breath sounds: Normal breath sounds. No stridor. No wheezing or rales. Chest:      Chest wall: No tenderness. Abdominal:      General: Bowel sounds are normal. There is no distension. Palpations: Abdomen is soft. There is no mass. Tenderness: There is no abdominal tenderness. There is no guarding or rebound. Genitourinary:     Comments: Will follow with own gynecologist for gynecological and breast care. Musculoskeletal:      Comments: Increased spasm L1 to S1 with decreased ROM. + straight leg raising and contralateral straight leg raising tests B/L   Lymphadenopathy:      Cervical: No cervical adenopathy. Skin:     General: Skin is warm and dry. Coloration: Skin is not pale. Findings: No erythema or rash. Neurological:      Mental Status: She is alert and oriented to person, place, and time. Cranial Nerves: No cranial nerve deficit. Motor: No abnormal muscle tone. Coordination: Coordination normal.      Deep Tendon Reflexes: Reflexes are normal and symmetric. Reflexes normal.   Psychiatric:         Behavior: Behavior normal.         Thought Content: Thought content normal.         Judgment: Judgment normal.         Assessment / Plan:   Jacky Law was seen today for fatigue and nausea. Diagnoses and all orders for this visit:    Lumbar disc disease with radiculopathy  -     Cancel: XR LUMBAR SPINE (MIN 4 VIEWS); Future  -     tiZANidine (ZANAFLEX) 4 MG tablet; Take 1 tablet by mouth nightly  -     metaxalone (SKELAXIN) 800 MG tablet; Take 1 tablet by mouth 3 times daily  -     nabumetone (RELAFEN) 750 MG tablet; Take 1 tablet by mouth 2 times daily  -     XR LUMBAR SPINE (2-3 VIEWS); Future    Right hip pain  -     Cancel: XR LUMBAR SPINE (MIN 4 VIEWS); Future  -     Cancel: XR HIP RIGHT (1 VIEW); Future  -     tiZANidine (ZANAFLEX) 4 MG tablet; Take 1 tablet by mouth nightly  -     metaxalone (SKELAXIN) 800 MG tablet; Take 1 tablet by mouth 3 times daily  -     nabumetone (RELAFEN) 750 MG tablet; Take 1 tablet by mouth 2 times daily  -     XR LUMBAR SPINE (2-3 VIEWS); Future  -     XR HIP BILATERAL W AP PELVIS (2 VIEWS); Future    Left hip pain  -     Cancel: XR LUMBAR SPINE (MIN 4 VIEWS); Future  -     Cancel: XR HIP LEFT (2-3 VIEWS);  Future  -     Cancel: XR HIP RIGHT (1 VIEW); Future  -     XR LUMBAR SPINE (2-3 VIEWS); Future  -     XR HIP BILATERAL W AP PELVIS (2 VIEWS); Future        Willfollow with own gynecologist for gynecological and breast care. Reviewed health maintenance report. Patient is aware of deficiencies and suggested preventative tests.

## 2021-04-19 DIAGNOSIS — M51.16 LUMBAR DISC DISEASE WITH RADICULOPATHY: Primary | ICD-10-CM

## 2021-04-22 ENCOUNTER — HOSPITAL ENCOUNTER (EMERGENCY)
Age: 46
Discharge: HOME | End: 2021-04-22
Attending: EMERGENCY MEDICINE
Payer: COMMERCIAL

## 2021-04-22 VITALS
HEART RATE: 88 BPM | DIASTOLIC BLOOD PRESSURE: 68 MMHG | OXYGEN SATURATION: 100 % | BODY MASS INDEX: 25.69 KG/M2 | WEIGHT: 145 LBS | HEIGHT: 63 IN | TEMPERATURE: 98.2 F | SYSTOLIC BLOOD PRESSURE: 143 MMHG | RESPIRATION RATE: 14 BRPM

## 2021-04-22 DIAGNOSIS — G89.29 ACUTE EXACERBATION OF CHRONIC LOW BACK PAIN: Primary | ICD-10-CM

## 2021-04-22 DIAGNOSIS — M48.061 NEURAL FORAMINAL STENOSIS OF LUMBAR SPINE: Chronic | ICD-10-CM

## 2021-04-22 DIAGNOSIS — M54.50 ACUTE EXACERBATION OF CHRONIC LOW BACK PAIN: Primary | ICD-10-CM

## 2021-04-22 DIAGNOSIS — M51.16 LUMBAR DISC DISEASE WITH RADICULOPATHY: Primary | ICD-10-CM

## 2021-04-22 PROCEDURE — 6360000002 HC RX W HCPCS: Performed by: EMERGENCY MEDICINE

## 2021-04-22 PROCEDURE — 96372 THER/PROPH/DIAG INJ SC/IM: CPT

## 2021-04-22 PROCEDURE — 99283 EMERGENCY DEPT VISIT LOW MDM: CPT

## 2021-04-22 PROCEDURE — 6370000000 HC RX 637 (ALT 250 FOR IP): Performed by: EMERGENCY MEDICINE

## 2021-04-22 RX ORDER — DEXAMETHASONE SODIUM PHOSPHATE 10 MG/ML
10 INJECTION, SOLUTION INTRAMUSCULAR; INTRAVENOUS ONCE
Status: COMPLETED | OUTPATIENT
Start: 2021-04-22 | End: 2021-04-22

## 2021-04-22 RX ORDER — PREDNISONE 50 MG/1
50 TABLET ORAL DAILY
Qty: 5 TABLET | Refills: 0 | Status: SHIPPED | OUTPATIENT
Start: 2021-04-22 | End: 2021-04-27

## 2021-04-22 RX ORDER — DIAZEPAM 5 MG/1
5 TABLET ORAL ONCE
Status: COMPLETED | OUTPATIENT
Start: 2021-04-22 | End: 2021-04-22

## 2021-04-22 RX ORDER — FENTANYL CITRATE 50 UG/ML
50 INJECTION, SOLUTION INTRAMUSCULAR; INTRAVENOUS ONCE
Status: COMPLETED | OUTPATIENT
Start: 2021-04-22 | End: 2021-04-22

## 2021-04-22 RX ORDER — KETOROLAC TROMETHAMINE 30 MG/ML
30 INJECTION, SOLUTION INTRAMUSCULAR; INTRAVENOUS ONCE
Status: COMPLETED | OUTPATIENT
Start: 2021-04-22 | End: 2021-04-22

## 2021-04-22 RX ADMIN — KETOROLAC TROMETHAMINE 30 MG: 30 INJECTION, SOLUTION INTRAMUSCULAR at 08:16

## 2021-04-22 RX ADMIN — FENTANYL CITRATE 50 MCG: 50 INJECTION, SOLUTION INTRAMUSCULAR; INTRAVENOUS at 10:06

## 2021-04-22 RX ADMIN — DIAZEPAM 5 MG: 5 TABLET ORAL at 08:13

## 2021-04-22 RX ADMIN — DEXAMETHASONE SODIUM PHOSPHATE 10 MG: 10 INJECTION, SOLUTION INTRAMUSCULAR; INTRAVENOUS at 08:15

## 2021-04-22 ASSESSMENT — ENCOUNTER SYMPTOMS
ABDOMINAL PAIN: 0
BACK PAIN: 1
COUGH: 0

## 2021-04-22 ASSESSMENT — PAIN SCALES - GENERAL
PAINLEVEL_OUTOF10: 10
PAINLEVEL_OUTOF10: 7

## 2021-04-22 ASSESSMENT — PAIN DESCRIPTION - LOCATION: LOCATION: BACK

## 2021-04-22 ASSESSMENT — PAIN DESCRIPTION - PAIN TYPE: TYPE: ACUTE PAIN

## 2021-04-22 NOTE — ED PROVIDER NOTES
This is a 43-year-old female with a past medical history of degenerative disc disease who presents to the ED for evaluation of back pain. Patient states that on Monday she developed worsening pain to her left lower back going down the backside of her leg. Denies any new traumas or falls. Patient states that pain is worsened with any kind of movement or walking. Denies any saddle anesthesia any fevers or incontinence. She states that typically her pain is more on her right side of her low back but this time it is on the left low back. Patient has no dysuria hematuria or abdominal pain. Patient states the pain is moderate in severity. She has been taking her home medications does not seem to improving her symptoms. The history is provided by the patient. No  was used. Review of Systems   Constitutional: Negative for fever. HENT: Negative for congestion. Respiratory: Negative for cough. Cardiovascular: Negative for chest pain. Gastrointestinal: Negative for abdominal pain. Endocrine: Negative for polyuria. Genitourinary: Negative for dysuria. Musculoskeletal: Positive for back pain. Skin: Negative for rash and wound. Allergic/Immunologic: Negative for immunocompromised state. Neurological: Negative for numbness. Psychiatric/Behavioral: Negative for confusion. Physical Exam  Vitals signs and nursing note reviewed. Constitutional:       General: She is not in acute distress. Appearance: She is well-developed. She is not ill-appearing. HENT:      Head: Normocephalic and atraumatic. Neck:      Musculoskeletal: Normal range of motion and neck supple. Vascular: No JVD. Cardiovascular:      Rate and Rhythm: Normal rate and regular rhythm. Pulmonary:      Effort: Pulmonary effort is normal.      Breath sounds: No wheezing, rhonchi or rales. Chest:      Chest wall: No tenderness. Abdominal:      General: There is no distension. Palpations: Abdomen is soft. Tenderness: There is no abdominal tenderness. There is no guarding or rebound. Hernia: No hernia is present. Musculoskeletal:      Comments: Strength 5 out of 5 to bilateral lower extremities. Tenderness to palpation to left lower lumbar musculature no midline tenderness or step-offs no lesions warmth or redness   Skin:     General: Skin is warm and dry. Capillary Refill: Capillary refill takes less than 2 seconds. Neurological:      General: No focal deficit present. Mental Status: She is alert and oriented to person, place, and time. Mental status is at baseline. Cranial Nerves: No cranial nerve deficit. Psychiatric:         Mood and Affect: Mood normal.         Behavior: Behavior normal.          Procedures     MDM  Number of Diagnoses or Management Options  Acute exacerbation of chronic low back pain  Diagnosis management comments: Patient has chronic low back back presented with worsening left lower back pain. She had no redflag symptosm concerning for emergemnt neurosurgery evaluation or MRI. Patient was treated symptomatically and was able to walk in the department without difficulty. Patient was advised to follow up with her PCP and her neurosurgeon. She was given strict return precautions.                    --------------------------------------------- PAST HISTORY ---------------------------------------------  Past Medical History:  has a past medical history of Bulging lumbar disc, Compression of nerve, Headache(784.0), Insomnia, and Tachycardia. Past Surgical History:  has a past surgical history that includes Knee arthroscopy (right); Breast surgery (augmentation); pr reconstruct prox humeral implant (Right, 5/14/2014); Biceps tendon repair (Right); Rotator cuff repair (Right); Breast enhancement surgery (Bilateral); Nerve Block (Right, 10 12 2015); Nerve Block (Right, 10 22 2015); Nerve Block (Right, 11 02 2015);  Nerve Block (11/11/15); Nerve Block (11/18/15); Nerve Block (11/25/15); Nerve Block (Right, 1 18 15); Nerve Block (Right, 02/01/16); Nerve Block (Bilateral, 07/25/2016); Nerve Block (Bilateral, 08/17/2016); other surgical history (Right, 09/10/2018); pr office/outpt visit,procedure only (Right, 9/10/2018); and HEMORROIDECTOMY (N/A, 5/7/2019). Social History:  reports that she has been smoking cigarettes. She has a 15.00 pack-year smoking history. She has never used smokeless tobacco. She reports current alcohol use. She reports that she does not use drugs. Family History: family history includes Cancer in her father and sister; Heart Disease in her father; High Blood Pressure in her mother; Migraines in her mother and son. The patients home medications have been reviewed. Allergies: Patient has no known allergies. -------------------------------------------------- RESULTS -------------------------------------------------  Labs:  No results found for this visit on 04/22/21. Radiology:  No orders to display       ------------------------- NURSING NOTES AND VITALS REVIEWED ---------------------------  Date / Time Roomed:  4/22/2021  7:26 AM  ED Bed Assignment:  20/20    The nursing notes within the ED encounter and vital signs as below have been reviewed. BP (!) 143/68   Pulse 88   Temp 98.2 °F (36.8 °C) (Oral)   Resp 14   Ht 5' 3\" (1.6 m)   Wt 145 lb (65.8 kg)   SpO2 100%   BMI 25.69 kg/m²   Oxygen Saturation Interpretation: Normal      ------------------------------------------ PROGRESS NOTES ------------------------------------------  7:29 AM EDT  I have spoken with the patient and discussed todays results, in addition to providing specific details for the plan of care and counseling regarding the diagnosis and prognosis. Their questions are answered at this time and they are agreeable with the plan. I discussed at length with them reasons for immediate return here for re evaluation.  They will followup with their PCP.    --------------------------------- ADDITIONAL PROVIDER NOTES ---------------------------------  At this time the patient is without objective evidence of an acute process requiring hospitalization or inpatient management. They have remained hemodynamically stable throughout their entire ED visit and are stable for discharge with outpatient follow-up. The plan has been discussed in detail and they are aware of the specific conditions for emergent return, as well as the importance of follow-up. Discharge Medication List as of 4/22/2021 10:18 AM      START taking these medications    Details   predniSONE (DELTASONE) 50 MG tablet Take 1 tablet by mouth daily for 5 days, Disp-5 tablet, R-0Print             Diagnosis:  1. Acute exacerbation of chronic low back pain        Disposition:  Patient's disposition: Discharge to home  Patient's condition is stable.       Nitin No, DO  04/23/21 1444

## 2021-04-27 ENCOUNTER — IMMUNIZATION (OUTPATIENT)
Dept: PRIMARY CARE CLINIC | Age: 46
End: 2021-04-27
Payer: COMMERCIAL

## 2021-04-27 PROCEDURE — 0012A COVID-19, MODERNA VACCINE 100MCG/0.5ML DOSE: CPT | Performed by: NURSE PRACTITIONER

## 2021-04-27 PROCEDURE — 91301 COVID-19, MODERNA VACCINE 100MCG/0.5ML DOSE: CPT | Performed by: NURSE PRACTITIONER

## 2021-04-30 ENCOUNTER — PREP FOR PROCEDURE (OUTPATIENT)
Dept: PAIN MANAGEMENT | Age: 46
End: 2021-04-30

## 2021-04-30 ENCOUNTER — OFFICE VISIT (OUTPATIENT)
Dept: PAIN MANAGEMENT | Age: 46
End: 2021-04-30
Payer: COMMERCIAL

## 2021-04-30 VITALS
OXYGEN SATURATION: 95 % | DIASTOLIC BLOOD PRESSURE: 80 MMHG | HEIGHT: 63 IN | BODY MASS INDEX: 25.69 KG/M2 | RESPIRATION RATE: 16 BRPM | SYSTOLIC BLOOD PRESSURE: 134 MMHG | TEMPERATURE: 97.5 F | HEART RATE: 95 BPM | WEIGHT: 145 LBS

## 2021-04-30 DIAGNOSIS — M51.36 DDD (DEGENERATIVE DISC DISEASE), LUMBAR: ICD-10-CM

## 2021-04-30 DIAGNOSIS — M53.3 SACROILIAC DYSFUNCTION: ICD-10-CM

## 2021-04-30 DIAGNOSIS — F17.200 SMOKING: ICD-10-CM

## 2021-04-30 DIAGNOSIS — M47.817 LUMBOSACRAL SPONDYLOSIS WITHOUT MYELOPATHY: Primary | ICD-10-CM

## 2021-04-30 PROCEDURE — 99204 OFFICE O/P NEW MOD 45 MIN: CPT | Performed by: ANESTHESIOLOGY

## 2021-04-30 PROCEDURE — 4004F PT TOBACCO SCREEN RCVD TLK: CPT | Performed by: ANESTHESIOLOGY

## 2021-04-30 PROCEDURE — G8419 CALC BMI OUT NRM PARAM NOF/U: HCPCS | Performed by: ANESTHESIOLOGY

## 2021-04-30 PROCEDURE — G8427 DOCREV CUR MEDS BY ELIG CLIN: HCPCS | Performed by: ANESTHESIOLOGY

## 2021-04-30 RX ORDER — LIDOCAINE 36 MG/1
PATCH TOPICAL
Qty: 9 PATCH | Refills: 0 | COMMUNITY
Start: 2021-04-30 | End: 2021-07-13 | Stop reason: ALTCHOICE

## 2021-04-30 NOTE — PROGRESS NOTES
Patient:  Rhonda Johnson,  1975  Date of Service:  21      Do you currently have any of the following:    Fever: No  Headache:  No  Cough: No  Shortness of breath: No  Exposed to anyone with these symptoms: No       Patient presents with complaints of back and down both legs top of thigh pain that started 10 years ago and has been getting worse. She states the pain began following No specific cause    Pain is constant and is described as stabbing, numb and grinding. She rates the pain as a 10/10 on her worst day , 6/10 on her best day, and a 8/10 on average on the VAS scale. Pain does radiate to both lower extremities. She  has numbness, tingling, weakness of the both lower extremities. Alleviating factors include: heat and ice. Aggravating factors include:  sitting, bending, lying down. She states that the pain does keep her from sleeping at night. She took her last dose of Relafen 16 motrin 200mg a day. She is  on NSAIDS and  is not on anticoagulation medications to include none and is managed by . Previous treatments: Physical Therapy, Nerve block, Epidural Steroid Injection, medications. and acupuncture. .      Personal Expectations from this treatment: increas acitvity decrease pain return to work and to feel normal again. /80   Pulse 95   Temp 97.5 °F (36.4 °C) (Infrared)   Resp 16   Ht 5' 3\" (1.6 m)   Wt 145 lb (65.8 kg)   SpO2 95%   BMI 25.69 kg/m²     No LMP recorded. Patient has had an injection.

## 2021-04-30 NOTE — PROGRESS NOTES
[facility]        [address] [facility], [address] [facility] [place]      [phone]                  Consult Note      Patient:  FANI Zuniga 1975    Date of Service:  21     Requesting Physician:  Curry Limon DO    Reason for Consult:      Patient presents with complaints of low back pain    HISTORY OF PRESENT ILLNESS:      Ms. Brice Johnson is a 41 y.o. female [facility] of  chronic low back pain for > 10 yrs. Has been treated by [facility] in the past - had interventions, was on pain meds. Apparently did not want to take pain pills and she stopped going there. Pain mainly over the low back in the lumbar area. Denies significant LE radiation. Recent exacerbation of pain needing ER visit- on 2021. Treated conservatively with oral steroids/ muscle relaxants / NSAID's. Denies LE tingling numbness or weakness. Patient does not have bladder or bowel dysfunction. Alleviating factors include: rest.  Aggravating factors include: movement, bending, lifting. Pain causes functional limitations/ limits Adl's : Yes     Nursing notes and details of the pain history reviewed. Please see intake notes for details. Previous treatments:   Physical Therapy : yes in the past and has continued regular PT directed [facility] for the past 6 months. But has been having difficulty to do exercises/ stretches since the recent exacerbation of pain. Medications: - NSAID's : yes             - Membrane stabilizers : yes             - Opioids : yes, was on Norco in the past            - Adjuvants or Others : yes,    TENS Unit: no    Surgeries: no LS spein surgery    Interventional Pain procedures/ nerve blocks: yes,     She has not been on anticoagulation medications     She has not been on herbal supplements. She is not diabetic.     H/O Smoking: yes  H/O alcohol abuse : denies  H/O Illicit drug use : denies    Employment: employed    Imaging:       Xray LS spine and Xray HIPS: 3/30/2021:  FINDINGS:   L-spine: Mild-to-moderate degenerative disc disease at L5-S1.  Moderate   degenerative facet arthropathy at this level as well.  No fracture or   dislocation.  Normal soft tissues.       Pelvis and bilateral hips: No fracture or dislocation.  Hip joint spaces are   relatively preserved.  Normal soft tissues.           Impression   L-spine: Degenerative spondylotic changes primarily at L5-S1.       Unremarkable pelvis and bilateral hips. MRI fo LS spine: 2016:     Findings: For purposes of the study, the last fully formed disc is considered   L5-S1 and corresponds to axial image 27 of series 7.       There is normal lumbar lordosis. Vertebral bodies maintain normal   height. Alignment is maintained. Bone marrow signals within normal   limits. There is no evidence of acute fracture.       Conus medullaris terminates at L1 and is normal in caliber and signal.       There is intervertebral disc desiccation with slight loss of disc   height L5-S1.       T12-L1: No disc herniation. No central canal or foraminal narrowing.       L1-L2: No disc herniation. No central canal or foraminal narrowing.       L2-L3: No disc herniation. No central canal or foraminal narrowing.       L3-L4: No disc herniation. No central canal or foraminal narrowing.       L4-L5: Minimal diffuse disc bulge. Mild bilateral facet hypertrophy. No central canal or foraminal narrowing.       L5-S1: Mild diffuse disc bulge with superimposed small central/right   paracentral disc protrusion which abuts the traversing right S1 nerve   root, similar to the previous study. There is no evidence of   compression or displacement. Mild bilateral facet hypertrophy.  No   central canal or foraminal narrowing.       Visualized paravertebral soft tissues are grossly unremarkable.           Impression   IMPRESSION:   Mild lower lumbar spondylosis, as described above, most pronounced at   L5-S1, where there is a small central/right paracentral disc   protrusion which abuts the traversing right S1 nerve root without   evidence of compression or displacement. Overall, findings are similar   to the previous MRI from 3/13/2015.        MRI of the Hip right: 4/2016:  Impression   IMPRESSION:   1.  No abnormal MR findings with no evidence of a labral tear.              Past Medical History:   Diagnosis Date    Bulging lumbar disc     Compression of nerve     Headache(784.0)     Insomnia     Tachycardia     takes metoprolol for irreg HR        Past Surgical History:   Procedure Laterality Date    BICEPS TENDON REPAIR Right     BREAST ENHANCEMENT SURGERY Bilateral     BREAST SURGERY  augmentation    HEMORROIDECTOMY N/A 5/7/2019    EXAM UNDER ANESTHESIA HEMORRHOIDECTOMY performed by Lenore Katz MD at [place] [address] ARTHROSCOPY  right    NERVE BLOCK Right 10 12 2015    lumbar right transforaminal nerve block #1 l4-5 l5-s1    NERVE BLOCK Right 10 22 2015    Transforaminal lumbar nerve block #2    NERVE BLOCK Right 11 02 2015    transforaminal nerve block right lumbar #3    NERVE BLOCK  11/11/15    sacroiliac joint right #1    NERVE BLOCK  11/18/15    sacroiliac joint right #2    NERVE BLOCK  11/25/15    sacroiliac joint right #3    NERVE BLOCK Right 1 18 15    hip inj #1    NERVE BLOCK Right 02/01/16    hip injection #2    NERVE BLOCK Bilateral 07/25/2016    Bilateral paravertebral facet lumbar #1    NERVE BLOCK Bilateral 08/17/2016    lumbar paravertebral facet #2    OTHER SURGICAL HISTORY Right 09/10/2018    laser upper eyelid    AL OFFICE/OUTPT VISIT,PROCEDURE ONLY Right 9/10/2018    ER YAG LASER ABLATION RIGHT UPPER EYELID SYMPTOMATIC performed by Kristian Cuevas MD at [address] [place] [address] Right 5/14/2014    Arthroplasty, Shoulder    ROTATOR CUFF REPAIR Right        Prior to Admission medications Medication Sig Start Date End Date Taking? Authorizing Provider   tiZANidine (ZANAFLEX) 4 MG tablet Take 1 tablet by mouth nightly 3/30/21  Yes Rei Fuel, DO   nabumetone (RELAFEN) 750 MG tablet Take 1 tablet by mouth 2 times daily 3/30/21  Yes Rei Fuel, DO   metoprolol succinate (TOPROL XL) 25 MG extended release tablet TAKE 1 TABLET BY MOUTH NIGHTLY 11/9/20  Yes Cesar Matias MD   albuterol sulfate HFA (PROVENTIL HFA) 108 (90 Base) MCG/ACT inhaler Inhale 2 puffs into the lungs every 6 hours as needed for Wheezing 11/3/17  Yes Rei Fuel, DO   medroxyPROGESTERone (DEPO-PROVERA) 150 MG/ML injection Inject 150 mg into the muscle once. Every 12 weeks   Yes Historical Provider, MD       No Known Allergies    Social History     Socioeconomic History    Marital status:      Spouse name: Not on file    Number of children: Not on file    Years of education: Not on file    Highest education level: Not on file   Occupational History    Not on file   Social Needs    Financial resource strain: Not on file    Food insecurity     Worry: Not on file     Inability: Not on file    Transportation needs     Medical: Not on file     Non-medical: Not on file   Tobacco Use    Smoking status: Current Every Day Smoker     Packs/day: 1.00     Years: 15.00     Pack years: 15.00     Types: Cigarettes    Smokeless tobacco: Never Used   Substance and Sexual Activity    Alcohol use:  Yes     Alcohol/week: 0.0 standard drinks    Drug use: No    Sexual activity: Yes     Partners: Male     Birth control/protection: Injection   Lifestyle    Physical activity     Days per week: Not on file     Minutes per session: Not on file    Stress: Not on file   Relationships    Social connections     Talks on phone: Not on file     Gets together: Not on file     Attends [employer] service: Not on file     Active member of club or organization: Not on file     Attends meetings of clubs or organizations: Not on file Relationship status: Not on file    Intimate partner violence     Fear of current or ex partner: Not on file     Emotionally abused: Not on file     Physically abused: Not on file     Forced sexual activity: Not on file   Other Topics Concern    Not on file   Social History Narrative    Not on file       Family History   Problem Relation Age of Onset    Migraines Mother     High Blood Pressure Mother     Migraines Son     Cancer Father     Heart Disease Father     Cancer Sister        REVIEW OF SYSTEMS:     Patient specifically denies fever/chills, chest pain, shortness of breath, new bowel or bladder complaints. All other review of systems was negative. Review of Systems - Documented reviewed. PHYSICAL EXAMINATION:      /80   Pulse 95   Temp 97.5 °F (36.4 °C) (Infrared)   Resp 16   Ht 5' 3\" (1.6 m)   Wt 145 lb (65.8 kg)   SpO2 95%   BMI 25.69 kg/m²     General:      General appearance:  Pleasant and well-hydrated, in no distress and A & O x 3  Build:Normal Weight  Function: Rises from seated position easily and Moves about room without difficulty    HEENT:    Head:normocephalic, atraumatic  Pupils:regular, round, equal  Sclera: icterus absent    Lungs:    Breathing:normal breathing pattern     CVS:     RRR    Abdomen:    Shape:non-distended and normal  Tenderness:none  Guarding:none    Cervical spine:    Inspection:normal  Palpation:tenderness paravertebral muscles, tenderness trapezium, left, right and negative  Range of motion:Normal flexion, extension, rotation bilaterally and is not painful. Spurling's: negative bilaterally    Thoracic spine:     Spine inspection:normal   Palpation:No tenderness over the midline and paraspinal area, bilaterally  Range of motion:normal in flexion, extension rotation bilateral and is not painful.     Lumbar spine:     Spine inspection: Normal   Palpation: Tenderness paravertebral muscles Yes bilaterally  Range of motion: Decreased, flexion Decreased, Lateral bending, extension and rotation bilaterally reduced is painful. Sacroiliac joint tenderness Yes bilaterally  Piriformis tenderness: negative bilaterally  SLR : negative bilaterally  Trochanteric bursa tenderness: negative bilaterally  CVA tenderness:No     Musculoskeletal:    Trigger points no    Extremities:    Tremors:None bilaterally upper and lower  Edema:none x all 4 extremities    Neurological:    Sensory: Normal to light touch     Motor:   Right  5/5              Left  5/5               Right Bicep 5/5           Left Bicep 5/5              Right Triceps 5/5       Left Triceps 5/5          Right Deltoid 5/5     Left Deltoid 5/5                  Right Quadriceps 5/5          Left Quadriceps 5/5           Right Gastrocnemius 5/5    Left Gastrocnemius 5/5  Right Ant Tibialis 5/5  Left Ant Tibialis 5/5    Reflexes:    B/l LE equal    Gait:normal Yes    Dermatology:    Skin:no rashes or lesions noted    Assessment/Plan:     Diagnosis Orders   1. Lumbosacral spondylosis without myelopathy     2. DDD (degenerative disc disease), lumbar     3. Sacroiliac dysfunction     4. Smoking         41 y.o. female with h/o chronic low back pain for over 10 yrs. Prior treatment at [facility]- meds/ interventions. Failed conservative treatment for > 6 weeks. Recent exacerbation few weeks ago- needing ER visit. Pain is significant- conservative treatment not much relief. Facet tenderness + SIJ tenderness+: Facet pain seems to be predominant. Hip ROM- no pain. Smoking +    Pain is significant and cannot do PT. Plan:    MRI of LS spine    Lumbar facet MBNB to address pain from B/l L3-4, L4-5, L5-S1 FACETS. RBA discussed and patient agreed. Continue Relafen    Muscle relaxants    ZT lido patch    Short course of Norco for prn use. # 20 tabs for prn use.     Urine screen today: no    Counseling :    Patient encouraged to stay active and to continue Regular home exercise program as tolerated - stretching / strengthening. Smoking cessation counseling : yes     Treatment plan discussed with the patient including medication and procedure side effects. Controlled Substances Monitoring:   [place]ARRS reviewed- not on chronic opioids. Shu Bray MD    Dear Dr. Roberto Bruce,   Thank you for referring Ms. Ellen Valdes and allowing us to participate in her care. Please do not hesitate to contact me if you have any questions regarding her care.     Yahir Yuen MD    CC:    Muna Hale, [facility] [address] [place] [address] [facility]

## 2021-05-03 NOTE — PROGRESS NOTES
Have you been tested for COVID  Yes           Have you been told you were positive for COVID No  Have you had any known exposure to someone that is positive for COVID No  Do you have a cough                   No              Do you have shortness of breath No                 Do you have a sore throat            No                Are you having chills                    No                Are you having muscle aches. No                    Please come to the hospital wearing a mask and have your significant other wear a mask as well. Both of you should check your temperature before leaving to come here,  if it is 100 or higher please call [phone] for instruction.

## 2021-05-03 NOTE — PROGRESS NOTES
[facility] MANAGEMENT  INSTRUCTIONS  . .......................................................................................................................................... [] Parking the day of Surgery is located in the [facility].   Upon entering the door, someone will be there to greet you    []  Bring photo ID and insurance card     [] You may have a light breakfast day of procedure    []  Wear loose comfortable clothing    []  Please follow instructions for medications as given per Dr's office     [] Stop blood thinners as per Dr's office instructions    [] You can expect a call the business day prior to procedure to notify you of your arrival time     [] Please arrange for     []  Other instructions

## 2021-05-06 ENCOUNTER — HOSPITAL ENCOUNTER (OUTPATIENT)
Age: 46
Setting detail: OUTPATIENT SURGERY
Discharge: HOME | End: 2021-05-06
Attending: ANESTHESIOLOGY | Admitting: ANESTHESIOLOGY
Payer: COMMERCIAL

## 2021-05-06 ENCOUNTER — HOSPITAL ENCOUNTER (OUTPATIENT)
Dept: GENERAL RADIOLOGY | Age: 46
Setting detail: OUTPATIENT SURGERY
Discharge: HOME | End: 2021-05-08
Attending: ANESTHESIOLOGY
Payer: COMMERCIAL

## 2021-05-06 VITALS
HEART RATE: 81 BPM | SYSTOLIC BLOOD PRESSURE: 145 MMHG | OXYGEN SATURATION: 98 % | DIASTOLIC BLOOD PRESSURE: 88 MMHG | TEMPERATURE: 98.1 F | RESPIRATION RATE: 17 BRPM | HEIGHT: 63 IN | WEIGHT: 145 LBS | BODY MASS INDEX: 25.69 KG/M2

## 2021-05-06 DIAGNOSIS — R52 PAIN MANAGEMENT: ICD-10-CM

## 2021-05-06 LAB
HCG, URINE, POC: NEGATIVE
Lab: NORMAL
NEGATIVE QC PASS/FAIL: NORMAL
POSITIVE QC PASS/FAIL: NORMAL

## 2021-05-06 PROCEDURE — 2500000003 HC RX 250 WO HCPCS: Performed by: ANESTHESIOLOGY

## 2021-05-06 PROCEDURE — 7100000010 HC PHASE II RECOVERY - FIRST 15 MIN: Performed by: ANESTHESIOLOGY

## 2021-05-06 PROCEDURE — 6360000002 HC RX W HCPCS: Performed by: ANESTHESIOLOGY

## 2021-05-06 PROCEDURE — 3600000012 HC SURGERY LEVEL 2 ADDTL 15MIN: Performed by: ANESTHESIOLOGY

## 2021-05-06 PROCEDURE — 7100000011 HC PHASE II RECOVERY - ADDTL 15 MIN: Performed by: ANESTHESIOLOGY

## 2021-05-06 PROCEDURE — 3600000002 HC SURGERY LEVEL 2 BASE: Performed by: ANESTHESIOLOGY

## 2021-05-06 PROCEDURE — 2709999900 HC NON-CHARGEABLE SUPPLY: Performed by: ANESTHESIOLOGY

## 2021-05-06 PROCEDURE — 3209999900 FLUORO FOR SURGICAL PROCEDURES

## 2021-05-06 PROCEDURE — 64493 INJ PARAVERT F JNT L/S 1 LEV: CPT | Performed by: ANESTHESIOLOGY

## 2021-05-06 RX ORDER — BUPIVACAINE HYDROCHLORIDE 5 MG/ML
INJECTION, SOLUTION EPIDURAL; INTRACAUDAL PRN
Status: DISCONTINUED | OUTPATIENT
Start: 2021-05-06 | End: 2021-05-06 | Stop reason: ALTCHOICE

## 2021-05-06 RX ORDER — METHYLPREDNISOLONE ACETATE 40 MG/ML
INJECTION, SUSPENSION INTRA-ARTICULAR; INTRALESIONAL; INTRAMUSCULAR; SOFT TISSUE PRN
Status: DISCONTINUED | OUTPATIENT
Start: 2021-05-06 | End: 2021-05-06 | Stop reason: ALTCHOICE

## 2021-05-06 RX ORDER — LIDOCAINE HYDROCHLORIDE 5 MG/ML
INJECTION, SOLUTION INFILTRATION; INTRAVENOUS PRN
Status: DISCONTINUED | OUTPATIENT
Start: 2021-05-06 | End: 2021-05-06 | Stop reason: ALTCHOICE

## 2021-05-06 ASSESSMENT — PAIN DESCRIPTION - DESCRIPTORS: DESCRIPTORS: SHARP;THROBBING

## 2021-05-06 NOTE — PROGRESS NOTES
1150: Discharge instructions reviewed, verbalized understanding. Steady gait, denies any new numbness or tingling.

## 2021-05-06 NOTE — H&P
Update History & Physical    The patient's History and Physical of April 30, 2021 was reviewed with the patient and I examined the patient. There was no change. The surgical site was confirmed by the patient and me. Plan: The risks, benefits, expected outcome, and alternative to the recommended procedure have been discussed with the patient. Patient understands and wants to proceed with the procedure. The patient was counseled at length about the risks of pat Covid-19 during their perioperative period and any recovery window from their procedure. The patient was made aware that pat Covid-19  may worsen their prognosis for recovering from their procedure  and lend to a higher morbidity and/or mortality risk. All material risks, benefits, and reasonable alternatives including postponing the procedure were discussed. The patient does wish to proceed with the procedure at this time.     Electronically signed by Lynn Moser MD

## 2021-05-06 NOTE — OP NOTE
monitors were placed and vital signs were observed throughout the procedure. The area of the lumbar spine was prepped with chloraprep and draped in a sterile manner. AP fluoroscopy was used to identify and meghan bartons point at the targeted levels. The skin and subcutaneous tissues in these identified areas were anesthetized with 0.5%Lidocaine. The 22 # gauge 3.5 inch spinal needles was advanced toward the junction of the superior articular process and the transverse process, along the course of the medial branch. Satisfactory needle placement was confirmed by AP and oblique projections. At the sacral alar level, the sacral alar region was visualized and the needle tip was positioned on the sacral alar at the base of the superior articulating process where the medial branch traverses under fluoroscopic guidance. Once bone was contacted and negative aspiration was confirmed. A solution of 0.5% marcaine with 5 mg DepoMedrol 1 cc was then injected at each level. Following the procedure Cb noted improvement of previous pain symptoms. Disposition the patient tolerated the procedure well and there were no complications . Vital signs remained stable throughout the procedure. The patient was escorted to the recovery area where they remained until discharge and written discharge instructions for the procedure were given. Plan: Dg Guadarrama will return to our pain management center as scheduled. She noticed good pain relief immediately after the procedure. She will observe for the duration of response and follow-up in office as instructed.     Xin Jones MD

## 2021-05-14 ENCOUNTER — HOSPITAL ENCOUNTER (OUTPATIENT)
Dept: MRI IMAGING | Age: 46
Discharge: HOME | End: 2021-05-16
Payer: COMMERCIAL

## 2021-05-14 DIAGNOSIS — M51.36 DDD (DEGENERATIVE DISC DISEASE), LUMBAR: ICD-10-CM

## 2021-05-14 DIAGNOSIS — M47.817 LUMBOSACRAL SPONDYLOSIS WITHOUT MYELOPATHY: ICD-10-CM

## 2021-05-14 PROCEDURE — 72148 MRI LUMBAR SPINE W/O DYE: CPT

## 2021-05-26 ENCOUNTER — OFFICE VISIT (OUTPATIENT)
Dept: PAIN MANAGEMENT | Age: 46
End: 2021-05-26
Payer: COMMERCIAL

## 2021-05-26 ENCOUNTER — PREP FOR PROCEDURE (OUTPATIENT)
Dept: PAIN MANAGEMENT | Age: 46
End: 2021-05-26

## 2021-05-26 VITALS
SYSTOLIC BLOOD PRESSURE: 118 MMHG | WEIGHT: 145 LBS | BODY MASS INDEX: 25.69 KG/M2 | HEART RATE: 68 BPM | RESPIRATION RATE: 16 BRPM | TEMPERATURE: 97.7 F | OXYGEN SATURATION: 98 % | HEIGHT: 63 IN | DIASTOLIC BLOOD PRESSURE: 72 MMHG

## 2021-05-26 DIAGNOSIS — M51.36 DDD (DEGENERATIVE DISC DISEASE), LUMBAR: Chronic | ICD-10-CM

## 2021-05-26 DIAGNOSIS — F17.200 SMOKING: ICD-10-CM

## 2021-05-26 DIAGNOSIS — M51.36 DDD (DEGENERATIVE DISC DISEASE), LUMBAR: Primary | ICD-10-CM

## 2021-05-26 DIAGNOSIS — M47.817 LUMBOSACRAL SPONDYLOSIS WITHOUT MYELOPATHY: ICD-10-CM

## 2021-05-26 DIAGNOSIS — M54.16 LUMBAR RADICULOPATHY: Primary | Chronic | ICD-10-CM

## 2021-05-26 DIAGNOSIS — M54.16 LUMBAR RADICULOPATHY: ICD-10-CM

## 2021-05-26 PROCEDURE — 99214 OFFICE O/P EST MOD 30 MIN: CPT | Performed by: ANESTHESIOLOGY

## 2021-05-26 PROCEDURE — G8419 CALC BMI OUT NRM PARAM NOF/U: HCPCS | Performed by: ANESTHESIOLOGY

## 2021-05-26 PROCEDURE — 4004F PT TOBACCO SCREEN RCVD TLK: CPT | Performed by: ANESTHESIOLOGY

## 2021-05-26 PROCEDURE — G8427 DOCREV CUR MEDS BY ELIG CLIN: HCPCS | Performed by: ANESTHESIOLOGY

## 2021-05-26 PROCEDURE — 99213 OFFICE O/P EST LOW 20 MIN: CPT | Performed by: ANESTHESIOLOGY

## 2021-05-26 RX ORDER — HYDROCODONE BITARTRATE AND ACETAMINOPHEN 5; 325 MG/1; MG/1
1 TABLET ORAL 2 TIMES DAILY PRN
Qty: 14 TABLET | Refills: 0 | Status: SHIPPED | OUTPATIENT
Start: 2021-05-26 | End: 2021-06-02

## 2021-05-26 NOTE — PROGRESS NOTES
Do you currently have any of the following:    Fever: No  Headache:  No  Cough: No  Shortness of breath: No  Exposed to anyone with these symptoms: No                                                                                                                Cb KELLY Lesli Vaughn presents to the [address] [place] [address] [address] on 5/26/2021. Gurwindre Romero is complaining of pain in lower back, buttocks and bilateral calves. . The pain is constant. The pain is described as aching, throbbing, shooting, stabbing, sharp and burning. Pain is rated on her best day at a 6, on her worst day at a 10, and on average at a 8 on the VAS scale. She took her last dose of Relafen, Tizanidine and Ibuprofen. . Gurwinder Romero does not have issues with constipation. Any procedures since your last visit: Yes, with 60 % relief x 4 days and then it gradually came back. She is  on NSAIDS and  is not on anticoagulation medications to include none and is managed by NA. Pacemaker or defibrillator: No Physician managing device is NA. Medication Contract and Consent for Opioid Use Documents Filed      No documents found                   /72   Pulse 68   Temp 97.7 °F (36.5 °C) (Infrared)   Resp 16   Ht 5' 3\" (1.6 m)   Wt 145 lb (65.8 kg)   SpO2 98%   BMI 25.69 kg/m²      No LMP recorded. Patient has had an injection.

## 2021-05-26 NOTE — PROGRESS NOTES
[address] [facility], [address] [place] [address] Rhett  [phone]    Follow up Note      Toy Noni     Date of Visit:  5/26/2021    CC:  Patient presents for follow up   Chief Complaint   Patient presents with    Follow Up After Procedure      1 BILATERAL LUMBAR MEDIAL BRANCH NERVE BLOCK UNDER FLUOROSCOPIC GUIDANCE AT L2, L3, L4 AND L5 DORSAL RAMI    CPT: 56003 [id number]    Results     MRI       HPI:  Chronic low back pain for > 10 yrs.     Has been treated by [facility] in the past - had interventions, was on pain meds. Apparently did not want to take pain pills and she stopped going there.     Pain mainly over the low back in the lumbar area.      Recent exacerbation of pain needing ER visit- on 4/22/2021. Treated conservatively with oral steroids/ muscle relaxants / NSAID's.       Pain causes functional limitations/ limits Adl's : Yes      Nursing notes and details of the pain history reviewed. Please see intake notes for details. S/P Lumbar MBNB # 1 with > 70-80% relief for short duration. Has noticed right LE pain intermittently and cramps. Has undergone MRI of LS spine  On 5/14/2021.     Previous treatments:   Physical Therapy : yes in the past and has continued regular PT directed [facility] for the past 6 months.  But has been having difficulty to do exercises/ stretches since the recent exacerbation of pain.     Medications: - NSAID's : yes                        - Membrane stabilizers : yes                        - Opioids : yes, was on Norco in the past                       - Adjuvants or Others : yes,     TENS Unit: no     Surgeries: no LS spein surgery     Interventional Pain procedures/ nerve blocks: yes,      She has not been on anticoagulation medications      She has not been on herbal supplements.       She is not diabetic.     H/O Smoking: yes  H/O alcohol abuse : denies  H/O Illicit drug use : denies     Employment: employed     Imaging:     MRI of LS spein: 5/14/2021:     FINDINGS:   BONES/ALIGNMENT: There is normal alignment of the lumbar spine. There is no   acute fracture or listhesis. Bone marrow signal intensity is normal. There is   disc desiccation and mild disc space narrowing at L5-S1. there is no   spondylolysis.       SPINAL CORD: The conus medullaris is normal in size and signal intensities   and terminates normally.       SOFT TISSUES: No paraspinal mass is identified.       L1-L2: There is no disc bulge or protrusion present.  There is no significant   spinal canal stenosis or neural foraminal narrowing present.       L2-L3: There is no disc bulge or protrusion present.  There is no significant   spinal canal stenosis or neural foraminal narrowing present.       L3-L4: There is no disc bulge or protrusion present.  There is no significant   spinal canal stenosis or neural foraminal narrowing present.       L4-L5: There is no disc bulge or protrusion present.  There is no significant   spinal canal stenosis or neural foraminal narrowing present.  There is mild   bilateral facet arthropathy.       L5-S1: There has been interval increase in size of a right paracentral disc   protrusion, now measuring 6 mm in AP dimension.  This posterior displaces the   right S1 nerve roots, effacing the right lateral recess.  There is a   concomitant disc bulge mildly narrowing both neural foramen.  There is no   significant spinal canal stenosis.  Mild bilateral facet arthropathy is   present.           Impression   1. Interval increase in size of a right paracentral disc protrusion at L5-S1,   posterior displacing the right S1 nerve roots and effacing the right lateral   recess.    2. Concomitant disc bulge and facet arthropathy at L5-S1 mildly narrowing   both neural foramen.        X-ray LS spine and Xray HIPS: 3/30/2021:  FINDINGS:   L-spine: Mild-to-moderate degenerative disc disease at L5-S1.  Moderate   degenerative facet arthropathy at this level as well.  No fracture or   dislocation.  Normal soft tissues.       Pelvis and bilateral hips: No fracture or dislocation.  Hip joint spaces are   relatively preserved.  Normal soft tissues.           Impression   L-spine: Degenerative spondylotic changes primarily at L5-S1.       Unremarkable pelvis and bilateral hips.           Potential Aberrant Drug-Related Behavior:      Urine Drug Screening:    OARRS report[de-identified]    Past Medical History:   Diagnosis Date    Bulging lumbar disc     Compression of nerve     Headache(784.0)     Insomnia     Tachycardia     takes metoprolol for irreg HR        Past Surgical History:   Procedure Laterality Date    BICEPS TENDON REPAIR Right     BREAST ENHANCEMENT SURGERY Bilateral     BREAST SURGERY  augmentation    HEMORROIDECTOMY N/A 5/7/2019    EXAM UNDER ANESTHESIA HEMORRHOIDECTOMY performed by Georgie Pool MD at [facility] 469 ARTHROSCOPY  right    NERVE BLOCK Right 10 12 2015    lumbar right transforaminal nerve block #1 l4-5 l5-s1    NERVE BLOCK Right 10 22 2015    Transforaminal lumbar nerve block #2    NERVE BLOCK Right 11 02 2015    transforaminal nerve block right lumbar #3    NERVE BLOCK  11/11/15    sacroiliac joint right #1    NERVE BLOCK  11/18/15    sacroiliac joint right #2    NERVE BLOCK  11/25/15    sacroiliac joint right #3    NERVE BLOCK Right 1 18 15    hip inj #1    NERVE BLOCK Right 02/01/16    hip injection #2    NERVE BLOCK Bilateral 07/25/2016    Bilateral paravertebral facet lumbar #1    NERVE BLOCK Bilateral 08/17/2016    lumbar paravertebral facet #2    NERVE BLOCK Bilateral 5/6/2021    # 1 BILATERAL LUMBAR MEDIAL BRANCH NERVE BLOCK UNDER FLUOROSCOPIC GUIDANCE AT L2, L3, L4 AND L5 DORSAL RAMI performed by Peggy Osuna MD at [facility] Right 09/10/2018    laser upper eyelid    [facility] OFFICE/OUTPT VISIT,PROCEDURE ONLY Right 9/10/2018    ER YAG LASER ABLATION RIGHT UPPER EYELID SYMPTOMATIC performed by Becka Res MD Jean at [address] [place] [address] ZOILA Olea Right 5/14/2014    Arthroplasty, Shoulder    ROTATOR CUFF REPAIR Right        Prior to Admission medications    Medication Sig Start Date End Date Taking? Authorizing Provider   Lidocaine (ZTLIDO) 1.8 % PTCH Apply to effective area 12 hours on 12 hours off 4/30/21  Yes Cole Jeffery MD   tiZANidine (ZANAFLEX) 4 MG tablet Take 1 tablet by mouth nightly 3/30/21  Yes Roberta Manrique DO   nabumetone (RELAFEN) 750 MG tablet Take 1 tablet by mouth 2 times daily 3/30/21  Yes Roberta Manrique DO   metoprolol succinate (TOPROL XL) 25 MG extended release tablet TAKE 1 TABLET BY MOUTH NIGHTLY 11/9/20  Yes Cole Cantrell MD   albuterol sulfate HFA (PROVENTIL HFA) 108 (90 Base) MCG/ACT inhaler Inhale 2 puffs into the lungs every 6 hours as needed for Wheezing 11/3/17  Yes Roberta Manrique DO   medroxyPROGESTERone (DEPO-PROVERA) 150 MG/ML injection Inject 150 mg into the muscle once. Every 12 weeks   Yes Historical Provider, MD       No Known Allergies    Social History     Socioeconomic History    Marital status:      Spouse name: Not on file    Number of children: Not on file    Years of education: Not on file    Highest education level: Not on file   Occupational History    Not on file   Tobacco Use    Smoking status: Current Every Day Smoker     Packs/day: 1.00     Years: 15.00     Pack years: 15.00     Types: Cigarettes    Smokeless tobacco: Never Used   Vaping Use    Vaping Use: Never used   Substance and Sexual Activity    Alcohol use:  Yes     Alcohol/week: 0.0 standard drinks    Drug use: No    Sexual activity: Yes     Partners: Male     Birth control/protection: Injection   Other Topics Concern    Not on file   Social History Narrative    Not on file     Social Determinants of Health     Financial Resource Strain:     Difficulty of Paying Living Expenses:    Food Insecurity:     Worried About Running Out of Food in the Last Year:     Ran Out of Food in the Last Year:    Transportation Needs:     Lack of Transportation (Medical):  Lack of Transportation (Non-Medical):    Physical Activity:     Days of Exercise per Week:     Minutes of Exercise per Session:    Stress:     Feeling of Stress :    Social Connections:     Frequency of Communication with Friends and Family:     Frequency of Social Gatherings with Friends and Family:     Attends Sikh Services:     Active Member of Clubs or Organizations:     Attends Club or Organization Meetings:     Marital Status:    Intimate Partner Violence:     Fear of Current or Ex-Partner:     Emotionally Abused:     Physically Abused:     Sexually Abused:        Family History   Problem Relation Age of Onset    Migraines Mother     High Blood Pressure Mother     Migraines Son     Cancer Father     Heart Disease Father     Cancer Sister        REVIEW OF SYSTEMS:     Brad Jamese denies fever/chills, chest pain, shortness of breath, new bowel or bladder complaints. All other review of systems was negative. PHYSICAL EXAMINATION:      /72   Pulse 68   Temp 97.7 °F (36.5 °C) (Infrared)   Resp 16   Ht 5' 3\" (1.6 m)   Wt 145 lb (65.8 kg)   SpO2 98%   BMI 25.69 kg/m²   General:       General appearance:  Pleasant and well-hydrated, in no distress and A & O x 3  Build:Normal Weight  Function: Rises from seated position easily and Moves about room without difficulty     HEENT:     Head:normocephalic, atraumatic     Lungs:     Breathing:normal breathing pattern      CVS:     RRR     Abdomen:     Shape:non-distended and normal     Cervical spine:     Inspection:normal  Palpation:tenderness paravertebral muscles, tenderness trapezium, left, right and negative  Range of motion:Normal flexion, extension, rotation bilaterally and is not painful.   Spurling's: negative bilaterally     Thoracic spine:                Spine inspection:normal   Palpation:No tenderness over the midline and paraspinal area, bilaterally  Range of motion:normal in flexion, extension rotation bilateral and is not painful.     Lumbar spine:      Spine inspection: Normal   Palpation: Tenderness paravertebral muscles Yes bilaterally  Range of motion: Decreased, flexion Decreased, Lateral bending, extension and rotation bilaterally reduced is painful. Sacroiliac joint tenderness Yes bilaterally  Piriformis tenderness: negative bilaterally  SLR : negative bilaterally  Trochanteric bursa tenderness: negative bilaterally  CVA tenderness:No      Musculoskeletal:     Trigger points no     Extremities:     Tremors:None bilaterally upper and lower  Edema:none x all 4 extremities     Neurological:     Sensory: Normal to light touch      Motor:   Right  5/5              Left  5/5               Right Bicep 5/5           Left Bicep 5/5              Right Triceps 5/5       Left Triceps 5/5          Right Deltoid 5/5     Left Deltoid 5/5                  Right Quadriceps 5/5          Left Quadriceps 5/5           Right Gastrocnemius 5/5    Left Gastrocnemius 5/5  Right Ant Tibialis 5/5  Left Ant Tibialis 5/5     Reflexes:    B/l LE equal     Gait:normal Yes     Dermatology:     Skin:no rashes or lesions noted     Assessment/Plan:       Diagnosis Orders   1. Lumbosacral spondylosis without myelopathy      2. DDD (degenerative disc disease), lumbar      3. Sacroiliac dysfunction      4. Smoking            41 y.o.  female with h/o chronic low back pain for over 10 yrs.     Prior treatment at [facility]- meds/ interventions.     Failed conservative treatment for > 6 weeks.     Recent exacerbation few weeks ago- needing ER visit.     Pain is significant- conservative treatment not much relief.     Facet tenderness +      Smoking +      MRI of LS spine- done recently reviewed personally - L5-S1 right paracentral disc protrusion ( slight increase) and facet changes noted.      S/P Lumbar MBNB # 1 with good short term pain relief. Has noticed right LE pain also. Will do right L5/ S1 TFESI under fluoroscopy. RBA discussed.     If continued Axial low back pain, will do # 2  Lumbar facet MBNB    Continue Relafen     Muscle relaxants     ZT lido patch      Short course of Norco for prn use for severe pain.     Urine screen today: no     Counseling : Patient encouraged to stay active and to continue Regular home exercise program as tolerated - stretching / strengthening.     Smoking cessation counseling : yes      Treatment plan discussed with the patient including medication and procedure side effects.     Controlled Substances Monitoring:   OARRS reviewed- not on chronic opioids.     Ghazal Johnson MD    CC:  Charline Young DO

## 2021-06-22 ENCOUNTER — HOSPITAL ENCOUNTER (OUTPATIENT)
Dept: OPERATING ROOM | Age: 46
Setting detail: OUTPATIENT SURGERY
Discharge: HOME | End: 2021-06-22
Attending: ANESTHESIOLOGY
Payer: COMMERCIAL

## 2021-06-22 ENCOUNTER — HOSPITAL ENCOUNTER (OUTPATIENT)
Age: 46
Setting detail: OUTPATIENT SURGERY
Discharge: HOME | End: 2021-06-22
Attending: ANESTHESIOLOGY | Admitting: ANESTHESIOLOGY
Payer: COMMERCIAL

## 2021-06-22 VITALS
SYSTOLIC BLOOD PRESSURE: 118 MMHG | RESPIRATION RATE: 16 BRPM | OXYGEN SATURATION: 99 % | WEIGHT: 140 LBS | BODY MASS INDEX: 24.8 KG/M2 | TEMPERATURE: 97 F | HEIGHT: 63 IN | HEART RATE: 85 BPM | DIASTOLIC BLOOD PRESSURE: 71 MMHG

## 2021-06-22 DIAGNOSIS — M51.36 LUMBAR DEGENERATIVE DISC DISEASE: ICD-10-CM

## 2021-06-22 PROCEDURE — 7100000011 HC PHASE II RECOVERY - ADDTL 15 MIN: Performed by: ANESTHESIOLOGY

## 2021-06-22 PROCEDURE — 6360000004 HC RX CONTRAST MEDICATION: Performed by: ANESTHESIOLOGY

## 2021-06-22 PROCEDURE — 6360000002 HC RX W HCPCS: Performed by: ANESTHESIOLOGY

## 2021-06-22 PROCEDURE — 64484 NJX AA&/STRD TFRM EPI L/S EA: CPT | Performed by: ANESTHESIOLOGY

## 2021-06-22 PROCEDURE — 3600000005 HC SURGERY LEVEL 5 BASE: Performed by: ANESTHESIOLOGY

## 2021-06-22 PROCEDURE — 7100000010 HC PHASE II RECOVERY - FIRST 15 MIN: Performed by: ANESTHESIOLOGY

## 2021-06-22 PROCEDURE — 2500000003 HC RX 250 WO HCPCS: Performed by: ANESTHESIOLOGY

## 2021-06-22 PROCEDURE — 3209999900 FLUORO FOR SURGICAL PROCEDURES

## 2021-06-22 PROCEDURE — 3600000015 HC SURGERY LEVEL 5 ADDTL 15MIN: Performed by: ANESTHESIOLOGY

## 2021-06-22 PROCEDURE — 64483 NJX AA&/STRD TFRM EPI L/S 1: CPT | Performed by: ANESTHESIOLOGY

## 2021-06-22 PROCEDURE — 2709999900 HC NON-CHARGEABLE SUPPLY: Performed by: ANESTHESIOLOGY

## 2021-06-22 RX ORDER — METHYLPREDNISOLONE ACETATE 40 MG/ML
INJECTION, SUSPENSION INTRA-ARTICULAR; INTRALESIONAL; INTRAMUSCULAR; SOFT TISSUE PRN
Status: DISCONTINUED | OUTPATIENT
Start: 2021-06-22 | End: 2021-06-22 | Stop reason: ALTCHOICE

## 2021-06-22 RX ORDER — LIDOCAINE HYDROCHLORIDE 5 MG/ML
INJECTION, SOLUTION INFILTRATION; INTRAVENOUS PRN
Status: DISCONTINUED | OUTPATIENT
Start: 2021-06-22 | End: 2021-06-22 | Stop reason: ALTCHOICE

## 2021-06-22 ASSESSMENT — PAIN DESCRIPTION - DESCRIPTORS: DESCRIPTORS: ACHING

## 2021-06-22 ASSESSMENT — PAIN SCALES - GENERAL
PAINLEVEL_OUTOF10: 0
PAINLEVEL_OUTOF10: 6

## 2021-06-22 ASSESSMENT — PAIN - FUNCTIONAL ASSESSMENT: PAIN_FUNCTIONAL_ASSESSMENT: 0-10

## 2021-06-22 NOTE — H&P
Update History & Physical    The patient's History and Physical of May 26, 2021 was reviewed with the patient and I examined the patient. There was no change. The surgical site was confirmed by the patient and me. Plan: The risks, benefits, expected outcome, and alternative to the recommended procedure have been discussed with the patient. Patient understands and wants to proceed with the procedure. The patient was counseled at length about the risks of pat Covid-19 during their perioperative period and any recovery window from their procedure. The patient was made aware that pat Covid-19  may worsen their prognosis for recovering from their procedure  and lend to a higher morbidity and/or mortality risk. All material risks, benefits, and reasonable alternatives including postponing the procedure were discussed. The patient does wish to proceed with the procedure at this time.       Electronically signed by Micheline Kawasaki, MD

## 2021-06-22 NOTE — OP NOTE
table. Standard monitors were placed and vital signs were observed throughout the procedure. The area of the lumbar spine was prepped with chloraprep and draped in a sterile manner. The vertebral body was identified with AP fluoroscopy. An oblique view was obtained to better visualize the inferior junction of the pedicle and transverse process . The 6 o'clock position of the pedicle was marked and identified. The skin and subcutaneous tissue were anesthetized with 0.5% lidocaine. TWO # 22 gauge 3.5 inch pencil point needles was directed toward the targeted point under fluoroscopy until bone was contacted. The needle was then walked inferiorly until the neural foramen was entered . A lateral fluoroscopic view was then used to place the needle tip in the middle of the foramen. Negative aspiration was confirmed for blood and CSF and 0.5-1 cc of Omnipaque 300 contrast was injected at each level under live fluoroscopy. Appropriate neurograms were observed under AP fluoroscopy. Then after negative aspiration, a solution of the 3 cc of 0.5% lidocaine and 30 mg DepoMedrol was easily injected at each level. The needles were removed with constant aspiration technique. The patient back was cleaned and a bandage was placed over the needle insertion points. Disposition the patient tolerated the procedure well and there were no complications . Vital signs remained stable throughout the procedure. The patient was escorted to the recovery area where they remained until discharge and written discharge instructions for the procedure were given. Plan: Lance Snow will return to our pain management center as scheduled.      Guzman Myrick MD

## 2021-07-13 ENCOUNTER — OFFICE VISIT (OUTPATIENT)
Dept: PAIN MANAGEMENT | Age: 46
End: 2021-07-13
Payer: COMMERCIAL

## 2021-07-13 ENCOUNTER — PREP FOR PROCEDURE (OUTPATIENT)
Dept: PAIN MANAGEMENT | Age: 46
End: 2021-07-13

## 2021-07-13 VITALS
WEIGHT: 145 LBS | DIASTOLIC BLOOD PRESSURE: 78 MMHG | RESPIRATION RATE: 16 BRPM | SYSTOLIC BLOOD PRESSURE: 128 MMHG | HEART RATE: 88 BPM | TEMPERATURE: 97.8 F | HEIGHT: 63 IN | BODY MASS INDEX: 25.69 KG/M2 | OXYGEN SATURATION: 97 %

## 2021-07-13 DIAGNOSIS — M54.16 LUMBAR RADICULOPATHY: Chronic | ICD-10-CM

## 2021-07-13 DIAGNOSIS — F17.200 SMOKING: ICD-10-CM

## 2021-07-13 DIAGNOSIS — M53.3 SACROILIAC DYSFUNCTION: ICD-10-CM

## 2021-07-13 DIAGNOSIS — M53.3 SACROILIAC DYSFUNCTION: Primary | ICD-10-CM

## 2021-07-13 DIAGNOSIS — M51.36 DDD (DEGENERATIVE DISC DISEASE), LUMBAR: Primary | ICD-10-CM

## 2021-07-13 DIAGNOSIS — M47.817 LUMBOSACRAL SPONDYLOSIS WITHOUT MYELOPATHY: ICD-10-CM

## 2021-07-13 PROCEDURE — 99213 OFFICE O/P EST LOW 20 MIN: CPT | Performed by: ANESTHESIOLOGY

## 2021-07-13 PROCEDURE — G8427 DOCREV CUR MEDS BY ELIG CLIN: HCPCS | Performed by: ANESTHESIOLOGY

## 2021-07-13 PROCEDURE — G8419 CALC BMI OUT NRM PARAM NOF/U: HCPCS | Performed by: ANESTHESIOLOGY

## 2021-07-13 PROCEDURE — 4004F PT TOBACCO SCREEN RCVD TLK: CPT | Performed by: ANESTHESIOLOGY

## 2021-07-13 NOTE — PROGRESS NOTES
[address] [place] [address], [address] [place] [address] Rhett  [phone]    Follow up Note      Sol Cai     Date of Visit:  7/13/2021    CC:  Patient presents for follow up   Chief Complaint   Patient presents with    Follow-up    Follow Up After Procedure     LUMBAR TRANSFORAMINAL EPIDURAL STEROID INJECTION RIGHT L5 AND S1 UNDER FLUOROSCOPIC GUIDANCE        HPI:  Chronic low back pain. Prior treatment at [facility] - had interventions, was on pain meds. Apparently did not want to take pain pills and she stopped going there.     Recent exacerbation of low back pain needing ER visit- on 4/22/2021. Failed conservative treatment. Pain causes functional limitations/ limits Adl's : Yes      Nursing notes and details of the pain history reviewed. Please see intake notes for details. S/P Lumbar MBNB # 1 with > 70-80% relief for short duration. MRI of LS spine  On 5/14/2021. S/p Right Lumbar TFESI > 90% pain relief of the LE pain. Current left low back pain .     Previous treatments:   Physical Therapy : yes in the past and has continued regular PT directed HEP for the past 6 months.      Medications: - NSAID's : yes                        - Membrane stabilizers : yes                        - Opioids : yes, was on Norco in the past                       - Adjuvants or Others : yes,     TENS Unit: no     Surgeries: no LS spine surgery     Interventional Pain procedures/ nerve blocks: yes     She has not been on anticoagulation medications      She has not been on herbal supplements.       She is not diabetic.     H/O Smoking: yes  H/O alcohol abuse : denies  H/O Illicit drug use : denies     Employment: employed     Imaging:     MRI of LS spein: 5/14/2021:     FINDINGS:   BONES/ALIGNMENT: There is normal alignment of the lumbar spine. There is no   acute fracture or listhesis.  Bone marrow signal intensity is normal. There is   disc desiccation and mild disc space narrowing at L5-S1. there is no   spondylolysis.       SPINAL CORD: The conus medullaris is normal in size and signal intensities   and terminates normally.       SOFT TISSUES: No paraspinal mass is identified.       L1-L2: There is no disc bulge or protrusion present.  There is no significant   spinal canal stenosis or neural foraminal narrowing present.       L2-L3: There is no disc bulge or protrusion present.  There is no significant   spinal canal stenosis or neural foraminal narrowing present.       L3-L4: There is no disc bulge or protrusion present.  There is no significant   spinal canal stenosis or neural foraminal narrowing present.       L4-L5: There is no disc bulge or protrusion present.  There is no significant   spinal canal stenosis or neural foraminal narrowing present.  There is mild   bilateral facet arthropathy.       L5-S1: There has been interval increase in size of a right paracentral disc   protrusion, now measuring 6 mm in AP dimension.  This posterior displaces the   right S1 nerve roots, effacing the right lateral recess.  There is a   concomitant disc bulge mildly narrowing both neural foramen.  There is no   significant spinal canal stenosis.  Mild bilateral facet arthropathy is   present.           Impression   1. Interval increase in size of a right paracentral disc protrusion at L5-S1,   posterior displacing the right S1 nerve roots and effacing the right lateral   recess.    2. Concomitant disc bulge and facet arthropathy at L5-S1 mildly narrowing   both neural foramen.        X-ray LS spine and Xray HIPS: 3/30/2021:  FINDINGS:   L-spine: Mild-to-moderate degenerative disc disease at L5-S1.  Moderate   degenerative facet arthropathy at this level as well.  No fracture or   dislocation.  Normal soft tissues.       Pelvis and bilateral hips: No fracture or dislocation.  Hip joint spaces are   relatively preserved.  Normal soft tissues.           Impression   L-spine: Degenerative spondylotic changes primarily at L5-S1.       Unremarkable pelvis and bilateral hips.           Potential Aberrant Drug-Related Behavior:  no    Urine Drug Screening:    OARRS report[de-identified]  Reviewed today- not on chronic opioids    Past Medical History:   Diagnosis Date    Bulging lumbar disc     Compression of nerve     Headache(784.0)     Insomnia     Tachycardia     takes metoprolol for irreg HR        Past Surgical History:   Procedure Laterality Date    BICEPS TENDON REPAIR Right     BREAST ENHANCEMENT SURGERY Bilateral     BREAST SURGERY  augmentation    HEMORROIDECTOMY N/A 5/7/2019    EXAM UNDER ANESTHESIA HEMORRHOIDECTOMY performed by Kylah Lee MD at [facility] ARTHROSCOPY  right    NERVE BLOCK Right 10 12 2015    lumbar right transforaminal nerve block #1 l4-5 l5-s1    NERVE BLOCK Right 10 22 2015    Transforaminal lumbar nerve block #2    NERVE BLOCK Right 11 02 2015    transforaminal nerve block right lumbar #3    NERVE BLOCK  11/11/15    sacroiliac joint right #1    NERVE BLOCK  11/18/15    sacroiliac joint right #2    NERVE BLOCK  11/25/15    sacroiliac joint right #3    NERVE BLOCK Right 1 18 15    hip inj #1    NERVE BLOCK Right 02/01/16    hip injection #2    NERVE BLOCK Bilateral 07/25/2016    Bilateral paravertebral facet lumbar #1    NERVE BLOCK Bilateral 08/17/2016    lumbar paravertebral facet #2    NERVE BLOCK Bilateral 5/6/2021    # 1 BILATERAL LUMBAR MEDIAL BRANCH NERVE BLOCK UNDER FLUOROSCOPIC GUIDANCE AT L2, L3, L4 AND L5 DORSAL RAMI performed by Brian Cheema MD at [facility] 21 Right 6/22/2021    LUMBAR TRANSFORAMINAL EPIDURAL STEROID INJECTION RIGHT L5 AND S1 UNDER FLUOROSCOPIC GUIDANCE (CPT [id number]) performed by Brian Cheema MD at [facility] 8 Right 09/10/2018    laser upper eyelid    MA OFFICE/OUTPT VISIT,PROCEDURE ONLY Right 9/10/2018    ER YAG LASER ABLATION RIGHT UPPER EYELID SYMPTOMATIC performed by Chente Constantino MD Jean at [address] [place] [address] ZOILA Olea Right 5/14/2014    Arthroplasty, Shoulder    ROTATOR CUFF REPAIR Right        Prior to Admission medications    Medication Sig Start Date End Date Taking? Authorizing Provider   metoprolol succinate (TOPROL XL) 25 MG extended release tablet TAKE 1 TABLET BY MOUTH NIGHTLY 11/9/20  Yes Lorna Prajapati MD   medroxyPROGESTERone (DEPO-PROVERA) 150 MG/ML injection Inject 150 mg into the muscle once. Every 12 weeks   Yes Historical Provider, MD       No Known Allergies    Social History     Socioeconomic History    Marital status:      Spouse name: Not on file    Number of children: Not on file    Years of education: Not on file    Highest education level: Not on file   Occupational History    Not on file   Tobacco Use    Smoking status: Current Every Day Smoker     Packs/day: 1.00     Years: 15.00     Pack years: 15.00     Types: Cigarettes    Smokeless tobacco: Never Used   Vaping Use    Vaping Use: Never used   Substance and Sexual Activity    Alcohol use: Yes     Alcohol/week: 0.0 standard drinks    Drug use: No    Sexual activity: Yes     Partners: Male     Birth control/protection: Injection   Other Topics Concern    Not on file   Social History Narrative    Not on file     Social Determinants of Health     Financial Resource Strain:     Difficulty of Paying Living Expenses:    Food Insecurity:     Worried About Running Out of Food in the Last Year:     [address] in the Last Year:    Transportation Needs:     Lack of Transportation (Medical):      Lack of Transportation (Non-Medical):    Physical Activity:     Days of Exercise per Week:     Minutes of Exercise per Session:    Stress:     Feeling of Stress :    Social Connections:     Frequency of Communication with Friends and Family:     Frequency of Social Gatherings with Friends and Family:     Attends [employer] Services:     Active Member of Clubs or Organizations:     Attends Club or Organization Meetings:     Marital Status:    Intimate Partner Violence:     Fear of Current or Ex-Partner:     Emotionally Abused:     Physically Abused:     Sexually Abused:        Family History   Problem Relation Age of Onset    Migraines Mother     High Blood Pressure Mother     Migraines Son     Cancer Father     Heart Disease Father     Cancer Sister        REVIEW OF SYSTEMS:     Sabrina Nowak denies fever/chills, chest pain, shortness of breath, new bowel or bladder complaints. All other review of systems was negative. PHYSICAL EXAMINATION:      /78   Pulse 88   Temp 97.8 °F (36.6 °C) (Infrared)   Resp 16   Ht 5' 3\" (1.6 m)   Wt 145 lb (65.8 kg)   SpO2 97%   BMI 25.69 kg/m²   General:       General appearance:  Pleasant and well-hydrated, in no distress and A & O x 3  Build:Normal Weight  Function: Rises from seated position easily and Moves about room without difficulty     HEENT:     Head:normocephalic, atraumatic     Lungs:     Breathing:normal breathing pattern      CVS:     RRR     Abdomen:     Shape:non-distended and normal     Cervical spine:     Inspection:normal  Palpation:tenderness paravertebral muscles, tenderness trapezium, left, right and negative  Range of motion:Normal flexion, extension, rotation bilaterally and is not painful. Spurling's: negative bilaterally     Thoracic spine:                Spine inspection:normal   Palpation:No tenderness over the midline and paraspinal area, bilaterally  Range of motion:normal in flexion, extension rotation bilateral and is not painful.     Lumbar spine:      Spine inspection: Normal   Palpation: Tenderness paravertebral muscles Yes bilaterally  Range of motion: Decreased, flexion Decreased, Lateral bending, extension and rotation bilaterally reduced is painful.   Sacroiliac joint tenderness Yes left  Gaenslen's & georgia's + left  Piriformis tenderness: negative bilaterally  SLR : negative bilaterally     Musculoskeletal:     Trigger points no     Extremities:     Tremors:None bilaterally upper and lower  Edema:none x all 4 extremities     Neurological:     Sensory: Normal to light touch      Motor:                   Right Quadriceps 5/5          Left Quadriceps 5/5           Right Gastrocnemius 5/5    Left Gastrocnemius 5/5  Right Ant Tibialis 5/5  Left Ant Tibialis 5/5     Reflexes:    B/l LE equal     Gait:normal Yes     Dermatology:     Skin:no rashes or lesions noted     Assessment/Plan:       Diagnosis Orders   1. Lumbosacral spondylosis without myelopathy      2. DDD (degenerative disc disease), lumbar      3. Sacroiliac dysfunction      4. Smoking            41 y.o.  female with H/o chronic low back pain for over 10 yrs.     Prior treatment at [facility]/ interventions.     Failed conservative treatment for > 6 weeks. MRI of LS spine- L5-S1 right paracentral disc protrusion (slight increase) and facet changes noted.     Facet pain improved after facet intervention. TFESI - excellent pain relief of radicular pain. Current pain over the left SIJ affecting functionality. Smoking +      Plan:    Left SIJ injection under fluoroscopy. RBA discussed.     Relafen for prn use.     Muscle relaxants      Counseling : Patient encouraged to stay active and to continue Regular home exercise program as tolerated - stretching / strengthening.     Smoking cessation counseling : yes      Treatment plan discussed with the patient including medication and procedure side effects.      Sidney West MD      CC:  Treasure Garcia DO

## 2021-07-13 NOTE — PROGRESS NOTES
Do you currently have any of the following:    Fever: No  Headache:  No  Cough: No  Shortness of breath: No  Exposed to anyone with these symptoms: No                                                                                                                Cb KELLY Krista Salazar presents to the [facility] on 7/13/2021. Marli Calderon is complaining of pain left lower back and hip. The pain is constant. The pain is described as aching, throbbing, stabbing, dull and sharp. Pain is rated on her best day at a 6, on her worst day at a 9, and on average at a 7 on the VAS scale. She took her last dose of Tylenol yesterday. Marli Calderon does not have issues with constipation. Any procedures since your last visit: Yes, with 98 % relief. She is not on NSAIDS and  is not on anticoagulation medications to include none and is managed by . Pacemaker or defibrillator: No Physician managing device is . Medication Contract and Consent for Opioid Use Documents Filed      No documents found                   /78   Pulse 88   Temp 97.8 °F (36.6 °C) (Infrared)   Resp 16   Ht 5' 3\" (1.6 m)   Wt 145 lb (65.8 kg)   SpO2 97%   BMI 25.69 kg/m²      No LMP recorded. Patient has had an injection.

## 2021-07-21 NOTE — PROGRESS NOTES
[facility] PA[facility] MANAGEMENT  INSTRUCTIONS  . .......................................................................................................................................... [] Parking the day of Surgery is located in the [facility].   Upon entering the door, make immediate right into the surgery reception room    []  Bring photo ID and insurance card     [] You may have a light breakfast day of procedure    []  Wear loose comfortable clothing    []  Please follow instructions for medications as given per Dr's office     [] Stop blood thinners as per Dr's office instructions    [] You can expect a call the business day prior to procedure to notify you of your arrival time     [] Please arrange for     []  Other instructions

## 2021-07-21 NOTE — PROGRESS NOTES
Have you been tested for COVID  Yes           Have you been told you were positive for COVID No  Have you had any known exposure to someone that is positive for COVID No  Do you have a cough                   No              Do you have shortness of breath No                 Do you have a sore throat            No                Are you having chills                    No                Are you having muscle aches. No                    Please come to the hospital wearing a mask and have your significant other wear a mask as well. Both of you should check your temperature before leaving to come here,  if it is 100 or higher please call [phone] for instruction.

## 2021-07-26 ENCOUNTER — HOSPITAL ENCOUNTER (OUTPATIENT)
Age: 46
Setting detail: OUTPATIENT SURGERY
Discharge: HOME | End: 2021-07-26
Attending: ANESTHESIOLOGY | Admitting: ANESTHESIOLOGY
Payer: COMMERCIAL

## 2021-07-26 ENCOUNTER — HOSPITAL ENCOUNTER (OUTPATIENT)
Dept: GENERAL RADIOLOGY | Age: 46
Setting detail: OUTPATIENT SURGERY
Discharge: HOME | End: 2021-07-28
Attending: ANESTHESIOLOGY
Payer: COMMERCIAL

## 2021-07-26 VITALS
BODY MASS INDEX: 24.8 KG/M2 | RESPIRATION RATE: 18 BRPM | SYSTOLIC BLOOD PRESSURE: 137 MMHG | OXYGEN SATURATION: 100 % | HEART RATE: 78 BPM | HEIGHT: 63 IN | DIASTOLIC BLOOD PRESSURE: 82 MMHG | WEIGHT: 140 LBS

## 2021-07-26 DIAGNOSIS — R52 PAIN MANAGEMENT: ICD-10-CM

## 2021-07-26 LAB
HCG, URINE, POC: NEGATIVE
Lab: NORMAL
NEGATIVE QC PASS/FAIL: NORMAL
POSITIVE QC PASS/FAIL: NORMAL

## 2021-07-26 PROCEDURE — 3209999900 FLUORO FOR SURGICAL PROCEDURES

## 2021-07-26 PROCEDURE — 2709999900 HC NON-CHARGEABLE SUPPLY: Performed by: ANESTHESIOLOGY

## 2021-07-26 PROCEDURE — 6360000002 HC RX W HCPCS: Performed by: ANESTHESIOLOGY

## 2021-07-26 PROCEDURE — 27096 INJECT SACROILIAC JOINT: CPT | Performed by: ANESTHESIOLOGY

## 2021-07-26 PROCEDURE — 7100000010 HC PHASE II RECOVERY - FIRST 15 MIN: Performed by: ANESTHESIOLOGY

## 2021-07-26 PROCEDURE — 6360000004 HC RX CONTRAST MEDICATION: Performed by: ANESTHESIOLOGY

## 2021-07-26 PROCEDURE — 2500000003 HC RX 250 WO HCPCS: Performed by: ANESTHESIOLOGY

## 2021-07-26 PROCEDURE — 3600000002 HC SURGERY LEVEL 2 BASE: Performed by: ANESTHESIOLOGY

## 2021-07-26 RX ORDER — BUPIVACAINE HYDROCHLORIDE 2.5 MG/ML
INJECTION, SOLUTION EPIDURAL; INFILTRATION; INTRACAUDAL PRN
Status: DISCONTINUED | OUTPATIENT
Start: 2021-07-26 | End: 2021-07-26 | Stop reason: ALTCHOICE

## 2021-07-26 RX ORDER — LIDOCAINE HYDROCHLORIDE 5 MG/ML
INJECTION, SOLUTION INFILTRATION; INTRAVENOUS PRN
Status: DISCONTINUED | OUTPATIENT
Start: 2021-07-26 | End: 2021-07-26 | Stop reason: ALTCHOICE

## 2021-07-26 RX ORDER — METHYLPREDNISOLONE ACETATE 40 MG/ML
INJECTION, SUSPENSION INTRA-ARTICULAR; INTRALESIONAL; INTRAMUSCULAR; SOFT TISSUE PRN
Status: DISCONTINUED | OUTPATIENT
Start: 2021-07-26 | End: 2021-07-26 | Stop reason: ALTCHOICE

## 2021-07-26 ASSESSMENT — PAIN - FUNCTIONAL ASSESSMENT: PAIN_FUNCTIONAL_ASSESSMENT: 0-10

## 2021-07-26 NOTE — H&P
Update History & Physical    The patient's History and Physical of July 13, 2021 was reviewed with the patient and I examined the patient. There was no change. The surgical site was confirmed by the patient and me. Plan: The risks, benefits, expected outcome, and alternative to the recommended procedure have been discussed with the patient. Patient understands and wants to proceed with the procedure. The patient was counseled at length about the risks of pat Covid-19 during their perioperative period and any recovery window from their procedure. The patient was made aware that pat Covid-19  may worsen their prognosis for recovering from their procedure  and lend to a higher morbidity and/or mortality risk. All material risks, benefits, and reasonable alternatives including postponing the procedure were discussed. The patient does wish to proceed with the procedure at this time.     Electronically signed by Grisel Vásquez MD

## 2021-07-26 NOTE — OP NOTE
Operative Note      Patient: Robert Arizmendi  YOB: 1975  MRN: [id number]    Date of Procedure: 2021    Pre-Op Diagnosis: SACROILIAC DISFUCTION    Post-Op Diagnosis: Same       Procedure(s):  LEFT SACROILIAC JOINT INJECTION UNDER FLUOROSCOPY      Surgeon(s):  Maryjane Washington MD    Assistant:   Surgical Assistant: Beatris Claude    Anesthesia: Local    Estimated Blood Loss (mL): Minimal    Complications: None    Specimens:   * No specimens in log *    Implants:  * No implants in log *      Drains: * No LDAs found *    Findings: good needle placement    Detailed Description of Procedure:   2021    Patient: Robert Arizmendi  :  1975  Age:  41 y.o. Sex:  female     PRE-OPERATIVE DIAGNOSIS:    Sacroiliitis, somatic dysfunction of the lumbosacral spine. POST-OPERATIVE DIAGNOSIS: Same. PROCEDURE:  Fluoroscopic guided Left   sacroiliac joint injection with steroid (#1). SURGEON:  Maryjane Washington MD    ANESTHESIA: Local    ESTIMATED BLOOD LOSS: None.  ______________________________________________________________________  BRIEF HISTORY:  Robert Arizmendi comes in today for first Left sacroiliac joint injection under fluoroscopic guidance. The potential complications as well as the procedure in detail were explained to her today. She has elected to undergo the aforementioned procedure. Cb's complete History & Physical examination were reviewed in depth, a copy of which is in the chart. DESCRIPTION OF PROCEDURE:    After confirming written and informed consent, a time-out was performed and Francisco name and date of birth, the procedure to be performed as well as the plan for the location of the needle insertion were confirmed. The patient was brought into the procedure room and placed in the prone position on the fluoroscopy table. Standard monitors were placed and vital signs were observed throughout the procedure.  The low back and upper buttocks area was prepped with chloraprep and draped in a sterile manner. AP fluoroscopy was used to visualize the sacroiliac joint. The fluoroscopic beam was then obliqued until the anterior and posterior margins of the joint were aligned. The inferior margin of the joint was identified and marked. The skin and subcutaneous tissue about this identified point were anesthestized with 0.5% lidocaine. A 22 gauge 3-1/2 spinal needle was advanced toward the the identified point under fluoroscopic guidance. Once the targeted point was reached and the joint space was entered, negative aspiration was confirmed, and 0.5 cc of 240 omnipaque was injected. The  Joint space was appropriately outlined. Then, after negative aspiration, a solution consisiting of 0.25% marcaine 2 cc and 40 mg DepoMedrol was easily injected. The needle was then removed and the needle insertion site was covered with Band-Aid. Disposition the patient tolerated the procedure well and there were no complications . Vital signs remained stable throughout the procedure. The patient was escorted to the recovery area where they remained until discharge and written discharge instructions for the procedure were given. Plan: Marli Calderon will return to our pain management center as scheduled.      Abigail Workman MD

## 2021-09-21 ENCOUNTER — PREP FOR PROCEDURE (OUTPATIENT)
Dept: PAIN MANAGEMENT | Age: 46
End: 2021-09-21

## 2021-09-21 ENCOUNTER — OFFICE VISIT (OUTPATIENT)
Dept: PAIN MANAGEMENT | Age: 46
End: 2021-09-21
Payer: COMMERCIAL

## 2021-09-21 VITALS
HEART RATE: 85 BPM | TEMPERATURE: 97.7 F | DIASTOLIC BLOOD PRESSURE: 70 MMHG | WEIGHT: 145 LBS | OXYGEN SATURATION: 99 % | HEIGHT: 63 IN | RESPIRATION RATE: 16 BRPM | BODY MASS INDEX: 25.69 KG/M2 | SYSTOLIC BLOOD PRESSURE: 116 MMHG

## 2021-09-21 DIAGNOSIS — M51.36 DDD (DEGENERATIVE DISC DISEASE), LUMBAR: ICD-10-CM

## 2021-09-21 DIAGNOSIS — M53.3 SACROILIAC DYSFUNCTION: ICD-10-CM

## 2021-09-21 DIAGNOSIS — M47.817 LUMBOSACRAL SPONDYLOSIS WITHOUT MYELOPATHY: Primary | ICD-10-CM

## 2021-09-21 DIAGNOSIS — F17.200 SMOKING: ICD-10-CM

## 2021-09-21 PROCEDURE — 4004F PT TOBACCO SCREEN RCVD TLK: CPT | Performed by: ANESTHESIOLOGY

## 2021-09-21 PROCEDURE — G8419 CALC BMI OUT NRM PARAM NOF/U: HCPCS | Performed by: ANESTHESIOLOGY

## 2021-09-21 PROCEDURE — G8427 DOCREV CUR MEDS BY ELIG CLIN: HCPCS | Performed by: ANESTHESIOLOGY

## 2021-09-21 PROCEDURE — 99213 OFFICE O/P EST LOW 20 MIN: CPT | Performed by: ANESTHESIOLOGY

## 2021-09-21 NOTE — PROGRESS NOTES
[address]  [address] [facility], [address] [place] [address] Rhett  [phone]    Follow up Note      Geovanny Aquino     Date of Visit:  9/21/2021    CC:  Patient presents for follow up   Chief Complaint   Patient presents with    Follow Up After Procedure     LEFT SACROILIAC JOINT INJECTION UNDER FLUOROSCOPY         HPI:  Chronic low back pain. Prior treatment at [facility] - had interventions, was on pain meds. Apparently did not want to take pain pills and she stopped going there.     Failed conservative treatment. Pain causes functional limitations/ limits Adl's : Yes      Nursing notes and details of the pain history reviewed. Please see intake notes for details. S/p Right Lumbar TFESI > 90% pain relief of the LE pain. Continued Axial low back pain. S/P Lumbar MBNB # 1 with > 70-80% relief for short duration. SIJ pain significantly improved after SIJ injection. Has low back pain. Doing HEP. MRI of LS spine  On 5/14/2021. Previous treatments:   Physical Therapy : yes in the past and has continued regular PT directed HEP for the past 6 months.      Medications: - NSAID's : yes                        - Membrane stabilizers : yes                        - Opioids : yes, was on Norco in the past                       - Adjuvants or Others : yes,     Surgeries: no LS spine surgery      She has not been on anticoagulation medications      She has not been on herbal supplements.       She is not diabetic.     H/O Smoking: yes  H/O alcohol abuse : denies  H/O Illicit drug use : denies     Employment: employed     Imaging:     MRI of LS spine: 5/14/2021:     FINDINGS:   BONES/ALIGNMENT: There is normal alignment of the lumbar spine. There is no   acute fracture or listhesis.  Bone marrow signal intensity is normal. There is   disc desiccation and mild disc space narrowing at L5-S1. there is no   spondylolysis.       SPINAL CORD: The conus medullaris is normal in size and signal intensities   and terminates normally.       SOFT TISSUES: No paraspinal mass is identified.       L1-L2: There is no disc bulge or protrusion present.  There is no significant   spinal canal stenosis or neural foraminal narrowing present.       L2-L3: There is no disc bulge or protrusion present.  There is no significant   spinal canal stenosis or neural foraminal narrowing present.       L3-L4: There is no disc bulge or protrusion present.  There is no significant   spinal canal stenosis or neural foraminal narrowing present.       L4-L5: There is no disc bulge or protrusion present.  There is no significant   spinal canal stenosis or neural foraminal narrowing present.  There is mild   bilateral facet arthropathy.       L5-S1: There has been interval increase in size of a right paracentral disc   protrusion, now measuring 6 mm in AP dimension.  This posterior displaces the   right S1 nerve roots, effacing the right lateral recess.  There is a   concomitant disc bulge mildly narrowing both neural foramen.  There is no   significant spinal canal stenosis.  Mild bilateral facet arthropathy is   present.           Impression   1. Interval increase in size of a right paracentral disc protrusion at L5-S1,   posterior displacing the right S1 nerve roots and effacing the right lateral   recess. 2. Concomitant disc bulge and facet arthropathy at L5-S1 mildly narrowing   both neural foramen.        X-ray LS spine and Xray HIPS: 3/30/2021:  FINDINGS:   L-spine: Mild-to-moderate degenerative disc disease at L5-S1.  Moderate   degenerative facet arthropathy at this level as well.  No fracture or   dislocation.  Normal soft tissues.       Pelvis and bilateral hips: No fracture or dislocation.  Hip joint spaces are   relatively preserved.  Normal soft tissues.           Impression   L-spine: Degenerative spondylotic changes primarily at L5-S1.       Unremarkable pelvis and bilateral hips.           Potential Aberrant EYELID SYMPTOMATIC performed by Darrick Smith MD at [address] [facility] [place] Right 5/14/2014    Arthroplasty, Shoulder    ROTATOR CUFF REPAIR Right        Prior to Admission medications    Medication Sig Start Date End Date Taking? Authorizing Provider   metoprolol succinate (TOPROL XL) 25 MG extended release tablet TAKE 1 TABLET BY MOUTH NIGHTLY 11/9/20  Yes Monty Arroyo MD   medroxyPROGESTERone (DEPO-PROVERA) 150 MG/ML injection Inject 150 mg into the muscle once. Every 12 weeks   Yes Historical Provider, MD       No Known Allergies    Social History     Socioeconomic History    Marital status:      Spouse name: Not on file    Number of children: Not on file    Years of education: Not on file    Highest education level: Not on file   Occupational History    Not on file   Tobacco Use    Smoking status: Current Every Day Smoker     Packs/day: 1.00     Years: 15.00     Pack years: 15.00     Types: Cigarettes    Smokeless tobacco: Never Used   Vaping Use    Vaping Use: Never used   Substance and Sexual Activity    Alcohol use: Yes     Alcohol/week: 0.0 standard drinks    Drug use: No    Sexual activity: Yes     Partners: Male     Birth control/protection: Injection   Other Topics Concern    Not on file   Social History Narrative    Not on file     Social Determinants of Health     Financial Resource Strain:     Difficulty of Paying Living Expenses:    Food Insecurity:     Worried About Running Out of Food in the Last Year:     [address] in the Last Year:    Transportation Needs:     Lack of Transportation (Medical):      Lack of Transportation (Non-Medical):    Physical Activity:     Days of Exercise per Week:     Minutes of Exercise per Session:    Stress:     Feeling of Stress :    Social Connections:     Frequency of Communication with Friends and Family:     Frequency of Social Gatherings with Friends and Family:     Attends Holiness Services:     Active Member of Clubs or Organizations:     Attends Club or Organization Meetings:     Marital Status:    Intimate Partner Violence:     Fear of Current or Ex-Partner:     Emotionally Abused:     Physically Abused:     Sexually Abused:        Family History   Problem Relation Age of Onset    Migraines Mother     High Blood Pressure Mother     Migraines Son     Cancer Father     Heart Disease Father     Cancer Sister        REVIEW OF SYSTEMS:     Javon Salazar denies fever/chills, chest pain, shortness of breath, new bowel or bladder complaints. All other review of systems was negative. PHYSICAL EXAMINATION:      /70   Pulse 85   Temp 97.7 °F (36.5 °C) (Infrared)   Resp 16   Ht 5' 3\" (1.6 m)   Wt 145 lb (65.8 kg)   SpO2 99%   BMI 25.69 kg/m²   General:       General appearance:  Pleasant and well-hydrated, in no distress and A & O x 3  Build:Normal Weight  Function: Rises from seated position easily and Moves about room without difficulty     HEENT:     Head:normocephalic, atraumatic     Lungs:     Breathing:normal breathing pattern      CVS:     RRR     Abdomen:     Shape:non-distended and normal     Cervical spine:     Inspection:normal    Thoracic spine:                Spine inspection:normal   Palpation:No tenderness over the midline and paraspinal area, bilaterally  Range of motion:normal in flexion, extension rotation bilateral and is not painful.     Lumbar spine:      Spine inspection: Normal   Palpation: Tenderness paravertebral muscles Yes bilaterally  Range of motion: Decreased, flexion Decreased, Lateral bending, extension and rotation bilaterally reduced is painful.   Lumbar facet loading + b/l  Sacroiliac joint tenderness improved pain  Piriformis tenderness: negative bilaterally  SLR : negative bilaterally     Musculoskeletal:     Trigger points no     Extremities:     Tremors:None bilaterally upper and lower  Edema:none x all 4 extremities     Neurological:     Sensory: Normal to light touch      Motor:                   Right Quadriceps 5/5          Left Quadriceps 5/5           Right Gastrocnemius 5/5    Left Gastrocnemius 5/5  Right Ant Tibialis 5/5  Left Ant Tibialis 5/5     Reflexes:    B/l LE equal     Gait:normal Yes     Dermatology:     Skin:no rashes or lesions noted     Assessment/Plan:       Diagnosis Orders   1. Lumbosacral spondylosis without myelopathy      2. DDD (degenerative disc disease), lumbar      3. Sacroiliac dysfunction      4. Smoking            40 y.o.  female with H/o chronic low back pain for over 10 yrs.     Prior treatment at [facility]/ interventions. Failed conservative treatment for > 6 weeks. MRI of LS spine- L5-S1 right paracentral disc protrusion (slight increase) and facet changes noted. TFESI - excellent pain relief of radicular pain. # 1 facet MBNB excellent pain relief of the low back pain. Now has recurrence of similar pain. Continues HEP. Current pain mainly in the low back - features of facet pain. Continues HEP. Smoking +       Plan:    # 2 lumbar MBNB to address pain from facet joints L3-4, L4-5, L5-S1 under fluoroscopy. RBA discussed.     Relafen for prn use.     Muscle relaxants      Counseling : Patient encouraged to stay active and to continue Regular home exercise program as tolerated - stretching / strengthening.     Smoking cessation counseling : yes      Treatment plan discussed with the patient including medication and procedure side effects.      Lizeth Peñaloza MD    CC:  Lesli Gil DO

## 2021-09-21 NOTE — PROGRESS NOTES
Do you currently have any of the following:    Fever: No  Headache:  No  Cough: No  Shortness of breath: No  Exposed to anyone with these symptoms: No                                                                                                                Cb Ferrara Job presents to the [address] [place] [address] on 9/21/2021. Cassandra Hernandez is complaining of pain in her lower back and hips. . The pain is constant. The pain is described as aching, throbbing, shooting, stabbing, sharp and feels like it is compressed. . Pain is rated on her best day at a 6, on her worst day at a 9, and on average at a 8 on the VAS scale. She took her last dose of Tylenol and Advil . Cassandra Hernandez does not have issues with constipation. Any procedures since your last visit: Yes, with 90 relief from this procedure, she still has relief from the 1st procedure, no sciatica pain shooting down her right leg. Pain is concentrated in the small of the back and bilateral hip pain when sitting. She is  on NSAIDS and  is not on anticoagulation medications to include none and is managed by NA. Pacemaker or defibrillator: No Physician managing device is NA. Medication Contract and Consent for Opioid Use Documents Filed      No documents found                   /70   Pulse 85   Temp 97.7 °F (36.5 °C) (Infrared)   Resp 16   Ht 5' 3\" (1.6 m)   Wt 145 lb (65.8 kg)   SpO2 99%   BMI 25.69 kg/m²      No LMP recorded. Patient has had an injection.

## 2021-10-12 ENCOUNTER — HOSPITAL ENCOUNTER (OUTPATIENT)
Age: 46
Setting detail: OUTPATIENT SURGERY
Discharge: HOME | End: 2021-10-12
Attending: ANESTHESIOLOGY | Admitting: ANESTHESIOLOGY
Payer: COMMERCIAL

## 2021-10-12 ENCOUNTER — HOSPITAL ENCOUNTER (OUTPATIENT)
Dept: OPERATING ROOM | Age: 46
Setting detail: OUTPATIENT SURGERY
Discharge: HOME | End: 2021-10-12
Attending: ANESTHESIOLOGY
Payer: COMMERCIAL

## 2021-10-12 VITALS
HEART RATE: 78 BPM | RESPIRATION RATE: 16 BRPM | WEIGHT: 145 LBS | BODY MASS INDEX: 25.69 KG/M2 | SYSTOLIC BLOOD PRESSURE: 141 MMHG | DIASTOLIC BLOOD PRESSURE: 78 MMHG | OXYGEN SATURATION: 97 % | HEIGHT: 63 IN

## 2021-10-12 DIAGNOSIS — M47.896 OTHER OSTEOARTHRITIS OF SPINE, LUMBAR REGION: ICD-10-CM

## 2021-10-12 LAB
HCG, URINE, POC: NEGATIVE
Lab: NORMAL
NEGATIVE QC PASS/FAIL: NORMAL
POSITIVE QC PASS/FAIL: NORMAL

## 2021-10-12 PROCEDURE — 2709999900 HC NON-CHARGEABLE SUPPLY: Performed by: ANESTHESIOLOGY

## 2021-10-12 PROCEDURE — 7100000011 HC PHASE II RECOVERY - ADDTL 15 MIN: Performed by: ANESTHESIOLOGY

## 2021-10-12 PROCEDURE — 3209999900 FLUORO FOR SURGICAL PROCEDURES

## 2021-10-12 PROCEDURE — 64493 INJ PARAVERT F JNT L/S 1 LEV: CPT | Performed by: ANESTHESIOLOGY

## 2021-10-12 PROCEDURE — 64494 INJ PARAVERT F JNT L/S 2 LEV: CPT | Performed by: ANESTHESIOLOGY

## 2021-10-12 PROCEDURE — 81025 URINE PREGNANCY TEST: CPT | Performed by: ANESTHESIOLOGY

## 2021-10-12 PROCEDURE — 2500000003 HC RX 250 WO HCPCS: Performed by: ANESTHESIOLOGY

## 2021-10-12 PROCEDURE — 64495 INJ PARAVERT F JNT L/S 3 LEV: CPT | Performed by: ANESTHESIOLOGY

## 2021-10-12 PROCEDURE — 3600000005 HC SURGERY LEVEL 5 BASE: Performed by: ANESTHESIOLOGY

## 2021-10-12 PROCEDURE — 6360000002 HC RX W HCPCS: Performed by: ANESTHESIOLOGY

## 2021-10-12 PROCEDURE — 7100000010 HC PHASE II RECOVERY - FIRST 15 MIN: Performed by: ANESTHESIOLOGY

## 2021-10-12 RX ORDER — METHYLPREDNISOLONE ACETATE 40 MG/ML
INJECTION, SUSPENSION INTRA-ARTICULAR; INTRALESIONAL; INTRAMUSCULAR; SOFT TISSUE PRN
Status: DISCONTINUED | OUTPATIENT
Start: 2021-10-12 | End: 2021-10-12 | Stop reason: ALTCHOICE

## 2021-10-12 RX ORDER — LIDOCAINE HYDROCHLORIDE 5 MG/ML
INJECTION, SOLUTION INFILTRATION; INTRAVENOUS PRN
Status: DISCONTINUED | OUTPATIENT
Start: 2021-10-12 | End: 2021-10-12 | Stop reason: ALTCHOICE

## 2021-10-12 RX ORDER — BUPIVACAINE HYDROCHLORIDE 5 MG/ML
INJECTION, SOLUTION PERINEURAL PRN
Status: DISCONTINUED | OUTPATIENT
Start: 2021-10-12 | End: 2021-10-12 | Stop reason: ALTCHOICE

## 2021-10-12 ASSESSMENT — PAIN SCALES - GENERAL
PAINLEVEL_OUTOF10: 0
PAINLEVEL_OUTOF10: 0

## 2021-10-12 ASSESSMENT — PAIN - FUNCTIONAL ASSESSMENT
PAIN_FUNCTIONAL_ASSESSMENT: 0-10
PAIN_FUNCTIONAL_ASSESSMENT: PREVENTS OR INTERFERES SOME ACTIVE ACTIVITIES AND ADLS

## 2021-10-12 ASSESSMENT — PAIN DESCRIPTION - DESCRIPTORS: DESCRIPTORS: ACHING

## 2021-10-12 NOTE — H&P
[address]  [address] [facility], [address] [place] [address] [place]  [phone]    Procedure History & Physical      Meggin Saravanan Holter     HPI:    Patient  is here for Lumbar MBNB for low back pain.   Labs/imaging studies reviewed   All question and concerns addressed including R/B/A associated with the procedure    Past Medical History:   Diagnosis Date    Bulging lumbar disc     Compression of nerve     Headache(784.0)     Insomnia     Tachycardia     takes metoprolol for irreg HR        Past Surgical History:   Procedure Laterality Date    BICEPS TENDON REPAIR Right     BREAST ENHANCEMENT SURGERY Bilateral     BREAST SURGERY  augmentation    HEMORROIDECTOMY N/A 5/7/2019    EXAM UNDER ANESTHESIA HEMORRHOIDECTOMY performed by Merlin Burkitt, MD at [place] [address] ARTHROSCOPY  right    MEDICATION INJECTION Left 7/26/2021    LEFT SACROILIAC JOINT INJECTION UNDER FLUOROSCOPY performed by Anna Garcia MD at [facility] 21 Right 10 12 2015    lumbar right transforaminal nerve block #1 l4-5 l5-s1    NERVE BLOCK Right 10 22 2015    Transforaminal lumbar nerve block #2    NERVE BLOCK Right 11 02 2015    transforaminal nerve block right lumbar #3    NERVE BLOCK  11/11/15    sacroiliac joint right #1    NERVE BLOCK  11/18/15    sacroiliac joint right #2    NERVE BLOCK  11/25/15    sacroiliac joint right #3    NERVE BLOCK Right 1 18 15    hip inj #1    NERVE BLOCK Right 02/01/16    hip injection #2    NERVE BLOCK Bilateral 07/25/2016    Bilateral paravertebral facet lumbar #1    NERVE BLOCK Bilateral 08/17/2016    lumbar paravertebral facet #2    NERVE BLOCK Bilateral 5/6/2021    # 1 BILATERAL LUMBAR MEDIAL BRANCH NERVE BLOCK UNDER FLUOROSCOPIC GUIDANCE AT L2, L3, L4 AND L5 DORSAL RAMI performed by Anna Garcia MD at [facility] Right 6/22/2021    LUMBAR TRANSFORAMINAL EPIDURAL STEROID INJECTION RIGHT L5 AND S1 UNDER FLUOROSCOPIC GUIDANCE (CPT 03710) performed by Helen Bryan MD at [place] [address] Right 09/10/2018    laser upper eyelid    IA OFFICE/OUTPT VISIT,PROCEDURE ONLY Right 9/10/2018    ER YAG LASER ABLATION RIGHT UPPER EYELID SYMPTOMATIC performed by Celia Bonilla MD at [address] [place] [address] Right 5/14/2014    Arthroplasty, Shoulder    ROTATOR CUFF REPAIR Right        Prior to Admission medications    Medication Sig Start Date End Date Taking? Authorizing Provider   metoprolol succinate (TOPROL XL) 25 MG extended release tablet TAKE 1 TABLET BY MOUTH NIGHTLY 11/9/20  Yes Chucho Ang MD   medroxyPROGESTERone (DEPO-PROVERA) 150 MG/ML injection Inject 150 mg into the muscle once. Every 12 weeks   Yes Historical Provider, MD       No Known Allergies    Social History     Socioeconomic History    Marital status:      Spouse name: Not on file    Number of children: Not on file    Years of education: Not on file    Highest education level: Not on file   Occupational History    Not on file   Tobacco Use    Smoking status: Current Every Day Smoker     Packs/day: 1.00     Years: 15.00     Pack years: 15.00     Types: Cigarettes    Smokeless tobacco: Never Used   Vaping Use    Vaping Use: Never used   Substance and Sexual Activity    Alcohol use: Yes     Alcohol/week: 0.0 standard drinks    Drug use: No    Sexual activity: Yes     Partners: Male     Birth control/protection: Injection   Other Topics Concern    Not on file   Social History Narrative    Not on file     Social Determinants of Health     Financial Resource Strain:     Difficulty of Paying Living Expenses:    Food Insecurity:     Worried About Running Out of Food in the Last Year:     [address] in the Last Year:    Transportation Needs:     Lack of Transportation (Medical):      Lack of Transportation (Non-Medical):    Physical Activity:     Days of Exercise per Week:     Minutes of Exercise per Session:    Stress:     Feeling of Stress :    Social Connections:     Frequency of Communication with Friends and Family:     Frequency of Social Gatherings with Friends and Family:     Attends Confucianist Services:     Active Member of Clubs or Organizations:     Attends Club or Organization Meetings:     Marital Status:    Intimate Partner Violence:     Fear of Current or Ex-Partner:     Emotionally Abused:     Physically Abused:     Sexually Abused:        Family History   Problem Relation Age of Onset    Migraines Mother     High Blood Pressure Mother     Migraines Son     Cancer Father     Heart Disease Father     Cancer Sister          REVIEW OF SYSTEMS:    CONSTITUTIONAL:  negative for  fevers, chills, sweats and fatigue    RESPIRATORY:  negative for  dry cough, cough with sputum, dyspnea, wheezing and chest pain    CARDIOVASCULAR:  negative for chest pain, dyspnea, palpitations, syncope    GASTROINTESTINAL:  negative for nausea, vomiting, change in bowel habits, diarrhea, constipation and abdominal pain    MUSCULOSKELETAL: negative for muscle weakness    SKIN: negative for itching or rashes. BEHAVIOR/PSYCH:  negative for poor appetite, increased appetite, decreased sleep and poor concentration    All other systems negative      PHYSICAL EXAM:    VITALS:  BP (!) 147/90   Pulse 89   Resp 16   Ht 5' 3\" (1.6 m)   Wt 145 lb (65.8 kg)   SpO2 99%   BMI 25.69 kg/m²     CONSTITUTIONAL:  awake, alert, cooperative, no apparent distress, and appears stated age    EYES: PERRLA, EOMI    LUNGS:  No increased work of breathing, no audible wheezing    CARDIOVASCULAR:  regular rate and rhythm    ABDOMEN:  Soft non tender non distended     EXTREMITIES: no signs of clubbing or cyanosis. MUSCULOSKELETAL: negative for flaccid muscle tone or spastic movements. SKIN: gross examination reveals no signs of rashes, or diaphoresis. NEURO: Cranial nerves II-XII grossly intact.  No signs of

## 2021-10-12 NOTE — OP NOTE
Operative Note      Patient: Dar Oleary  YOB: 1975  MRN: [id number]    Date of Procedure: 10/12/2021    Pre-Op Diagnosis: LUMBOSACRAL SPONDYLOSIS WITHOUT MYELOPATHY    Post-Op Diagnosis: Same       Procedure(s):  BILATERL LUMBAR MEDIAL BRANCH NERVE BLOCK L2,L3,L4 AND ,L5 DORSAL RAMI WITH XRAY    Surgeon(s):  Tanesha Whitlock MD    Assistant:   * No surgical staff found *    Anesthesia: Local    Estimated Blood Loss (mL): Minimal    Complications: None    Specimens:   * No specimens in log *    Implants:  * No implants in log *      Drains: * No LDAs found *    Findings: good needle placement    Detailed Description of Procedure:   10/12/2021    Patient: Dar Oleary  :  1975  Age:  40 y.o. Sex:  female     PRE-OPERATIVE DIAGNOSIS: Bilateral Lumbar spondylosis, lumbar facet syndrome. POST-OPERATIVE DIAGNOSIS: Same. PROCEDURE:  # 2 Fluoroscopic guided lumbar medial branch blocks Bilateral at Levels: L2, L3, L4 MB & L5DR to address pain form facet joints at B/l L3-4, L4-5, L5-S1. SURGEON: Tanesha Whitlock MD    ANESTHESIA: Local    ESTIMATED BLOOD LOSS: None.  ______________________________________________________________________  BRIEF HISTORY:  Dar Oleary comes in today for 2 fluoroscopic guided lumbar medial branch blocks  Bilateral  at Levels: L3-4, L4-5, L5-S1. The potential complications of this procedure were discussed with her again today. She has elected to undergo the aforementioned procedure. Francisco complete History & Physical examination were reviewed in depth, a copy of which is in the chart. DESCRIPTION OF PROCEDURE:   After confirming written and informed consent, a time-out was performed and Francisco name and date of birth, the procedure to be performed as well as the plan for the location of the needle insertion were confirmed. The patient was brought into the procedure room and placed in the prone position on the fluoroscopy table. Standard monitors were placed and vital signs were observed throughout the procedure. The area of the lumbar spine was prepped with chloraprep and draped in a sterile manner. AP fluoroscopy was used to identify and meghan bartons point at the targeted levels. The skin and subcutaneous tissues in these identified areas were anesthetized with 0.5%Lidocaine. The 22 # gauge 3.5 spinal needle was advanced toward the junction of the superior articular process and the transverse process, along the course of the medial branch. Satisfactory needle placement was confirmed by AP and oblique projections. At the sacral alar level, the sacral alar region was visualized and the needle tip was positioned on the sacral alar at the base of the superior articulating process where the medial branch traverses under fluoroscopic guidance. Once bone was contacted and negative aspiration was confirmed. A solution of 0.5% marcaine with 5 mg DepoMedrol 1 cc was then injected at each level. Following the procedure L[place] noted improvement of previous pain symptoms. Disposition the patient tolerated the procedure well and there were no complications . Vital signs remained stable throughout the procedure. The patient was escorted to the recovery area where they remained until discharge and written discharge instructions for the procedure were given. Plan: Regina Ballard will return to our pain management center as scheduled. Pre-procedure pain: 8/10    Post-procedure pain: 0/10    If short lasting, will do RFA.     Laisha Boudreaux MD

## 2021-10-29 ENCOUNTER — OFFICE VISIT (OUTPATIENT)
Dept: PAIN MANAGEMENT | Age: 46
End: 2021-10-29
Payer: COMMERCIAL

## 2021-10-29 ENCOUNTER — PREP FOR PROCEDURE (OUTPATIENT)
Dept: PAIN MANAGEMENT | Age: 46
End: 2021-10-29

## 2021-10-29 VITALS
HEART RATE: 84 BPM | DIASTOLIC BLOOD PRESSURE: 60 MMHG | HEIGHT: 63 IN | SYSTOLIC BLOOD PRESSURE: 120 MMHG | BODY MASS INDEX: 24.8 KG/M2 | TEMPERATURE: 97.7 F | RESPIRATION RATE: 20 BRPM | WEIGHT: 140 LBS

## 2021-10-29 DIAGNOSIS — M47.817 LUMBOSACRAL SPONDYLOSIS WITHOUT MYELOPATHY: Primary | ICD-10-CM

## 2021-10-29 DIAGNOSIS — M51.36 DDD (DEGENERATIVE DISC DISEASE), LUMBAR: ICD-10-CM

## 2021-10-29 PROCEDURE — 99213 OFFICE O/P EST LOW 20 MIN: CPT | Performed by: ANESTHESIOLOGY

## 2021-10-29 PROCEDURE — G8427 DOCREV CUR MEDS BY ELIG CLIN: HCPCS | Performed by: ANESTHESIOLOGY

## 2021-10-29 PROCEDURE — 4004F PT TOBACCO SCREEN RCVD TLK: CPT | Performed by: ANESTHESIOLOGY

## 2021-10-29 PROCEDURE — G8484 FLU IMMUNIZE NO ADMIN: HCPCS | Performed by: ANESTHESIOLOGY

## 2021-10-29 PROCEDURE — G8420 CALC BMI NORM PARAMETERS: HCPCS | Performed by: ANESTHESIOLOGY

## 2021-10-29 NOTE — PROGRESS NOTES
[address] [place] [address], [address] [place] [address] Rhett  [phone]    Follow up Note      Nicolle Rojas     Date of Visit:  10/29/2021    CC:  Patient presents for follow up   Chief Complaint   Patient presents with    Follow-up    Back Pain     lower radiates to bilateral hips and groin right is greater       HPI:  Chronic low back pain. Prior treatment at [facility] - had interventions, was on pain meds. Apparently did not want to take pain pills and she stopped going there.     Failed conservative treatment. Pain causes functional limitations/ limits Adl's : Yes      Nursing notes and details of the pain history reviewed. Please see intake notes for details. S/p Right Lumbar TFESI > 90% pain relief of the LE pain. Continued Axial low back pain. S/P Lumbar MBNB X2 with > 75-80% relief for short duration with significant improvement in pain and functionality. Has low back pain. Doing HEP. MRI of LS spine on 5/14/2021. Previous treatments:   Physical Therapy : yes in the past and has continued regular PT directed HEP for the past 6 months.      Medications: - NSAID's : yes                        - Membrane stabilizers : yes                        - Opioids : yes, was on Norco in the past                       - Adjuvants or Others : yes,     Surgeries: no LS spine surgery      She has not been on anticoagulation medications      She has not been on herbal supplements.       She is not diabetic.     H/O Smoking: yes  H/O alcohol abuse : denies  H/O Illicit drug use : denies     Employment: employed     Imaging:     MRI of LS spine: 5/14/2021:     FINDINGS:   BONES/ALIGNMENT: There is normal alignment of the lumbar spine. There is no   acute fracture or listhesis.  Bone marrow signal intensity is normal. There is   disc desiccation and mild disc space narrowing at L5-S1. there is no   spondylolysis.       SPINAL CORD: The conus medullaris is normal in size and signal intensities   and terminates normally.       SOFT TISSUES: No paraspinal mass is identified.       L1-L2: There is no disc bulge or protrusion present.  There is no significant   spinal canal stenosis or neural foraminal narrowing present.       L2-L3: There is no disc bulge or protrusion present.  There is no significant   spinal canal stenosis or neural foraminal narrowing present.       L3-L4: There is no disc bulge or protrusion present.  There is no significant   spinal canal stenosis or neural foraminal narrowing present.       L4-L5: There is no disc bulge or protrusion present.  There is no significant   spinal canal stenosis or neural foraminal narrowing present.  There is mild   bilateral facet arthropathy.       L5-S1: There has been interval increase in size of a right paracentral disc   protrusion, now measuring 6 mm in AP dimension.  This posterior displaces the   right S1 nerve roots, effacing the right lateral recess.  There is a   concomitant disc bulge mildly narrowing both neural foramen.  There is no   significant spinal canal stenosis.  Mild bilateral facet arthropathy is   present.           Impression   1. Interval increase in size of a right paracentral disc protrusion at L5-S1,   posterior displacing the right S1 nerve roots and effacing the right lateral   recess. 2. Concomitant disc bulge and facet arthropathy at L5-S1 mildly narrowing   both neural foramen.        X-ray LS spine and Xray HIPS: 3/30/2021:  FINDINGS:   L-spine: Mild-to-moderate degenerative disc disease at L5-S1.  Moderate   degenerative facet arthropathy at this level as well.  No fracture or   dislocation.  Normal soft tissues.       Pelvis and bilateral hips: No fracture or dislocation.  Hip joint spaces are   relatively preserved.  Normal soft tissues.           Impression   L-spine: Degenerative spondylotic changes primarily at L5-S1.       Unremarkable pelvis and bilateral hips.           Potential Aberrant Drug-Related Behavior:  no    Urine Drug Screening:    OARRS report[de-identified]  Reviewed today- not on chronic opioids    Past Medical History:   Diagnosis Date    Bulging lumbar disc     Compression of nerve     Headache(784.0)     Insomnia     Tachycardia     takes metoprolol for irreg HR        Past Surgical History:   Procedure Laterality Date    BICEPS TENDON REPAIR Right     BREAST ENHANCEMENT SURGERY Bilateral     BREAST SURGERY  augmentation    HEMORROIDECTOMY N/A 5/7/2019    EXAM UNDER ANESTHESIA HEMORRHOIDECTOMY performed by Gabino Rodríguez MD at [place] [address] ARTHROSCOPY  right    MEDICATION INJECTION Left 7/26/2021    LEFT SACROILIAC JOINT INJECTION UNDER FLUOROSCOPY performed by Ashlie Morrissey MD at [facility] Right 10 12 2015    lumbar right transforaminal nerve block #1 l4-5 l5-s1    NERVE BLOCK Right 10 22 2015    Transforaminal lumbar nerve block #2    NERVE BLOCK Right 11 02 2015    transforaminal nerve block right lumbar #3    NERVE BLOCK  11/11/15    sacroiliac joint right #1    NERVE BLOCK  11/18/15    sacroiliac joint right #2    NERVE BLOCK  11/25/15    sacroiliac joint right #3    NERVE BLOCK Right 1 18 15    hip inj #1    NERVE BLOCK Right 02/01/16    hip injection #2    NERVE BLOCK Bilateral 07/25/2016    Bilateral paravertebral facet lumbar #1    NERVE BLOCK Bilateral 08/17/2016    lumbar paravertebral facet #2    NERVE BLOCK Bilateral 5/6/2021    # 1 BILATERAL LUMBAR MEDIAL BRANCH NERVE BLOCK UNDER FLUOROSCOPIC GUIDANCE AT L2, L3, L4 AND L5 DORSAL RAMI performed by Ashlie Morrissey MD at [facility] Right 6/22/2021    LUMBAR TRANSFORAMINAL EPIDURAL STEROID INJECTION RIGHT L5 AND S1 UNDER FLUOROSCOPIC GUIDANCE (CPT 84095) performed by Ashlie Morrissey MD at [facility] N/A 10/12/2021    BILATERL LUMBAR MEDIAL BRANCH NERVE BLOCK L2,L3,L4 AND ,L5 DORSAL RAMI WITH XRAY  (CPT N7880381) performed by Ashlie Morrissey MD at [facility] Right 09/10/2018    laser upper eyelid    UT OFFICE/OUTPT VISIT,PROCEDURE ONLY Right 9/10/2018    ER YAG LASER ABLATION RIGHT UPPER EYELID SYMPTOMATIC performed by Maria Luisa Tejeda MD at [address] [place] [address] Right 5/14/2014    Arthroplasty, Shoulder    ROTATOR CUFF REPAIR Right        Prior to Admission medications    Medication Sig Start Date End Date Taking? Authorizing Provider   metoprolol succinate (TOPROL XL) 25 MG extended release tablet TAKE 1 TABLET BY MOUTH NIGHTLY 11/9/20  Yes Winifred Hines MD   medroxyPROGESTERone (DEPO-PROVERA) 150 MG/ML injection Inject 150 mg into the muscle once. Every 12 weeks   Yes Historical Provider, MD       No Known Allergies    Social History     Socioeconomic History    Marital status:      Spouse name: Not on file    Number of children: Not on file    Years of education: Not on file    Highest education level: Not on file   Occupational History    Not on file   Tobacco Use    Smoking status: Current Every Day Smoker     Packs/day: 1.00     Years: 15.00     Pack years: 15.00     Types: Cigarettes    Smokeless tobacco: Never Used   Vaping Use    Vaping Use: Never used   Substance and Sexual Activity    Alcohol use: Yes     Alcohol/week: 0.0 standard drinks     Comment: infrequent social wine    Drug use: No    Sexual activity: Not Currently     Partners: Male     Birth control/protection: Injection   Other Topics Concern    Not on file   Social History Narrative    Not on file     Social Determinants of Health     Financial Resource Strain:     Difficulty of Paying Living Expenses:    Food Insecurity:     Worried About Running Out of Food in the Last Year:     [address] in the Last Year:    Transportation Needs:     Lack of Transportation (Medical):      Lack of Transportation (Non-Medical):    Physical Activity:     Days of Exercise per Week:     Minutes of Exercise per Session:    Stress:     Feeling of Stress :    Social Connections:     Frequency of Communication with Friends and Family:     Frequency of Social Gatherings with Friends and Family:     Attends Islam Services:     Active Member of Clubs or Organizations:     Attends Club or Organization Meetings:     Marital Status:    Intimate Partner Violence:     Fear of Current or Ex-Partner:     Emotionally Abused:     Physically Abused:     Sexually Abused:        Family History   Problem Relation Age of Onset    Migraines Mother     High Blood Pressure Mother     Migraines Son     Cancer Father     Heart Disease Father     Cancer Sister        REVIEW OF SYSTEMS:     Neo Lugo denies fever/chills, chest pain, shortness of breath, new bowel or bladder complaints. All other review of systems was negative. PHYSICAL EXAMINATION:      /60   Pulse 84   Temp 97.7 °F (36.5 °C)   Resp 20   Ht 5' 3\" (1.6 m)   Wt 140 lb (63.5 kg)   BMI 24.80 kg/m²   General:       General appearance:  Pleasant and well-hydrated, in no distress and A & O x 3  Build:Normal Weight  Function: Rises from seated position easily and Moves about room without difficulty     HEENT:     Head:normocephalic, atraumatic     Lungs:     Breathing:normal breathing pattern      CVS:     RRR     Abdomen:     Shape:non-distended and normal     Cervical spine:     Inspection:normal    Thoracic spine:                Spine inspection:normal        Lumbar spine:      Spine inspection: Normal   Palpation: Tenderness paravertebral muscles Yes bilaterally  Range of motion: Decreased, flexion Decreased, Lateral bending, extension and rotation bilaterally reduced is painful.   Lumbar facet loading + b/l  Sacroiliac joint tenderness improved pain  Piriformis tenderness: negative bilaterally  SLR : negative bilaterally     Musculoskeletal:     Trigger points no     Extremities:     Tremors:None bilaterally upper and lower  Edema:none x all 4 extremities     Neurological:     Sensory: Normal to light touch      Motor:                   Right Quadriceps 5/5          Left Quadriceps 5/5           Right Gastrocnemius 5/5    Left Gastrocnemius 5/5  Right Ant Tibialis 5/5  Left Ant Tibialis 5/5      Gait:normal Yes     Dermatology:     Skin:no rashes or lesions noted     Assessment/Plan:       Diagnosis Orders   1. Lumbosacral spondylosis without myelopathy      2. DDD (degenerative disc disease), lumbar      3. Sacroiliac dysfunction      4. Smoking            40 y.o.  female with H/o chronic low back pain for over 10 yrs.     Prior treatment at [facility]- meds/ interventions. Failed conservative treatment for > 6 weeks. MRI of LS spine- L5-S1 right paracentral disc protrusion and facet changes noted. TFESI - excellent pain relief of radicular pain. Current pain mainly in the low back - features of facet pain. Continues HEP. S/P Lumbar MBNB X2 with > 75-80% relief for short duration with significant improvement in pain and functionality. Now has recurrence of similar axial low back pain. Continues HEP. Smoking +       Plan:    Radiofrequency ablation of lumbar MBNB to address pain from facet joints L3-4, L4-5, L5-S1 under fluoroscopy. RBA discussed. Moderate sedation due to invasive nature of the procedure and to make sure patient stays still during the procedure.     Relafen for prn use.     Muscle relaxants      Counseling : Patient encouraged to stay active and to continue Regular home exercise program as tolerated - stretching / strengthening.     Smoking cessation counseling : yes      Treatment plan discussed with the patient including medication and procedure side effects.      Remington Mccollum MD    CC:  Rosemary Domínguez DO

## 2021-12-01 ENCOUNTER — TELEPHONE (OUTPATIENT)
Dept: PAIN MANAGEMENT | Age: 46
End: 2021-12-01

## 2021-12-01 NOTE — TELEPHONE ENCOUNTER
Call to Leesa Pearl that procedure was scheduled for 12/7/2021 and that the surgery center should call her a few days before for the pre op call and after 3:00 PM the business day before with the arrival time. Instructed Cb to hold ibuprofen for 24 hours, naprosyn for 4 days and any aspirin containing products or fish oil for 7 days. Instructed to call office back if any questions. Cb verbalized understanding.

## 2021-12-02 ENCOUNTER — ANESTHESIA EVENT (OUTPATIENT)
Dept: OPERATING ROOM | Age: 46
End: 2021-12-02
Payer: COMMERCIAL

## 2021-12-06 ASSESSMENT — LIFESTYLE VARIABLES: SMOKING_STATUS: 1

## 2021-12-06 NOTE — ANESTHESIA PRE PROCEDURE
Department of Anesthesiology  Preprocedure Note       Name:  Jess Gray   Age:  40 y.o.  :  1975                                          MRN:  [id number]         Date:  2021      Surgeon: Dorys Baca):  Oumou Puentes MD    Procedure: Procedure(s):  BILATERAL LUMBAR RADIOFREQUENCY ABLATION UNDER FLUOROSCOPIC GUIDANCE AT L2, L3, L4 AND L5 WITH SEDATION (CPT 29463)    Medications prior to admission:   Prior to Admission medications    Medication Sig Start Date End Date Taking? Authorizing Provider   metoprolol succinate (TOPROL XL) 25 MG extended release tablet TAKE 1 TABLET BY MOUTH NIGHTLY 20  Yes Drew Max MD   medroxyPROGESTERone (DEPO-PROVERA) 150 MG/ML injection Inject 150 mg into the muscle once. Every 12 weeks   Yes Historical Provider, MD       Current medications:    No current facility-administered medications for this encounter. Current Outpatient Medications   Medication Sig Dispense Refill    metoprolol succinate (TOPROL XL) 25 MG extended release tablet TAKE 1 TABLET BY MOUTH NIGHTLY 90 tablet 3    medroxyPROGESTERone (DEPO-PROVERA) 150 MG/ML injection Inject 150 mg into the muscle once.  Every 12 weeks         Allergies:  No Known Allergies    Problem List:    Patient Active Problem List   Diagnosis Code    Shoulder bursitis M75.50    Impingement syndrome of shoulder M75.40    Shoulder pain M25.519    Biceps tendonitis M75.20    Worsening headaches R51.9    Back pain M54.9    Chronic migraine [id number]    Protruded lumbar disc M51.26    DDD (degenerative disc disease), lumbar M51.36    Neural foraminal stenosis of lumbar spine M48.061    Sacroiliac dysfunction M53.3    Facet syndrome, lumbar M47.816    Lumbar radiculopathy M54.16    Osteoarthritis of right hip M16.0    Acetabular labrum tear S73.199A    Lumbar spinal stenosis M48.061    Xanthelasma H02.60    Hx of sinus tachycardia Z86.79    Lumbosacral spondylosis without myelopathy M47.817 Past Medical History:        Diagnosis Date    Bulging lumbar disc     Compression of nerve     Headache(784.0)     Insomnia     Tachycardia     takes metoprolol for irreg HR        Past Surgical History:        Procedure Laterality Date    BICEPS TENDON REPAIR Right     BREAST ENHANCEMENT SURGERY Bilateral     BREAST SURGERY  augmentation    HEMORROIDECTOMY N/A 5/7/2019    EXAM UNDER ANESTHESIA HEMORRHOIDECTOMY performed by King Henry MD at [facility] ARTHROSCOPY  right    MEDICATION INJECTION Left 7/26/2021    LEFT SACROILIAC JOINT INJECTION UNDER FLUOROSCOPY performed by Patricio Wilson MD at [address] [facility] [address] Right 10 12 2015    lumbar right transforaminal nerve block #1 l4-5 l5-s1    NERVE BLOCK Right 10 22 2015    Transforaminal lumbar nerve block #2    NERVE BLOCK Right 11 02 2015    transforaminal nerve block right lumbar #3    NERVE BLOCK  11/11/15    sacroiliac joint right #1    NERVE BLOCK  11/18/15    sacroiliac joint right #2    NERVE BLOCK  11/25/15    sacroiliac joint right #3    NERVE BLOCK Right 1 18 15    hip inj #1    NERVE BLOCK Right 02/01/16    hip injection #2    NERVE BLOCK Bilateral 07/25/2016    Bilateral paravertebral facet lumbar #1    NERVE BLOCK Bilateral 08/17/2016    lumbar paravertebral facet #2    NERVE BLOCK Bilateral 5/6/2021    # 1 BILATERAL LUMBAR MEDIAL BRANCH NERVE BLOCK UNDER FLUOROSCOPIC GUIDANCE AT L2, L3, L4 AND L5 DORSAL RAMI performed by Patricio Wilson MD at [address] [facility] [address] Right 6/22/2021    LUMBAR TRANSFORAMINAL EPIDURAL STEROID INJECTION RIGHT L5 AND S1 UNDER FLUOROSCOPIC GUIDANCE (CPT 10738) performed by Patricio Wilson MD at [facility] N/A 10/12/2021    BILATERL LUMBAR MEDIAL BRANCH NERVE BLOCK L2,L3,L4 AND ,L5 DORSAL RAMI WITH XRAY  (CPT [id number]) performed by Patricio Wilson MD at [place] [address] Right 09/10/2018    laser upper eyelid    MN OFFICE/OUTPT VISIT,PROCEDURE ONLY Right 9/10/2018    ER YAG LASER ABLATION RIGHT UPPER EYELID SYMPTOMATIC performed by Brissa Avila MD at [address] [place] [address] Right 5/14/2014    Arthroplasty, Shoulder    ROTATOR CUFF REPAIR Right        Social History:    Social History     Tobacco Use    Smoking status: Current Every Day Smoker     Packs/day: 0.50     Years: 15.00     Pack years: 7.50     Types: Cigarettes    Smokeless tobacco: Never Used   Substance Use Topics    Alcohol use: Yes     Alcohol/week: 0.0 standard drinks     Comment: infrequent social wine                                Ready to quit: Not Answered  Counseling given: Not Answered      Vital Signs (Current):   Vitals:    12/01/21 1439   Weight: 145 lb (65.8 kg)   Height: 5' 3\" (1.6 m)                                              BP Readings from Last 3 Encounters:   10/29/21 120/60   10/12/21 (!) 141/78   09/21/21 116/70       NPO Status:                                                                                 BMI:   Wt Readings from Last 3 Encounters:   12/01/21 145 lb (65.8 kg)   10/29/21 140 lb (63.5 kg)   10/06/21 145 lb (65.8 kg)     Body mass index is 25.69 kg/m².     CBC:   Lab Results   Component Value Date    WBC 12.8 05/03/2019    RBC 4.51 05/03/2019    HGB 14.1 05/03/2019    HCT 41.6 05/03/2019    MCV 92.2 05/03/2019    RDW 13.3 05/03/2019     05/03/2019       CMP:   Lab Results   Component Value Date     05/03/2019    K 4.4 05/03/2019     05/03/2019    CO2 25 05/03/2019    BUN 12 05/03/2019    CREATININE 0.7 05/03/2019    GFRAA >60 05/03/2019    LABGLOM >60 05/03/2019    GLUCOSE 109 05/03/2019    GLUCOSE 90 09/13/2011    PROT 7.4 11/03/2017    CALCIUM 8.9 05/03/2019    BILITOT 0.2 11/03/2017    ALKPHOS 56 11/03/2017    AST 16 11/03/2017    ALT 11 11/03/2017       POC Tests: No results for input(s): POCGLU, POCNA, POCK, POCCL, POCBUN, POCHEMO, POCHCT in the last 72 hours.    Coags:   Lab Results   Component Value Date    APTT 26.7 05/05/2014       HCG (If Applicable):   Lab Results   Component Value Date    PREGTESTUR NEGATIVE 05/07/2019        ABGs: No results found for: PHART, PO2ART, OXU2TRJ, UPK0GPB, BEART, J0PKDNXD     Type & Screen (If Applicable):  No results found for: LABABO, LABRH    Drug/Infectious Status (If Applicable):  No results found for: HIV, HEPCAB    COVID-19 Screening (If Applicable): No results found for: COVID19        Anesthesia Evaluation  Patient summary reviewed  Airway: Mallampati: II  TM distance: >3 FB   Neck ROM: full  Mouth opening: > = 3 FB Dental: normal exam         Pulmonary: breath sounds clear to auscultation  (+) current smoker (7.5 pk yrs)                           Cardiovascular:    (+) dysrhythmias (hx tachyarrythmia):,         Rhythm: regular  Rate: normal           Beta Blocker:  Dose within 24 Hrs         Neuro/Psych:   (+) neuromuscular disease:, headaches:,              ROS comment: HNP L spine GI/Hepatic/Renal:        (-) no morbid obesity       Endo/Other:    (+) : arthritis:., .                 Abdominal:         (-) obese       Vascular: negative vascular ROS. Other Findings:           Anesthesia Plan      MAC     ASA 3       Induction: intravenous. Anesthetic plan and risks discussed with patient. Plan discussed with CRNA. Fanny Elias MD   12/6/2021  DOS STAFF ADDENDUM:    Pt seen and examined, chart reviewed (including anesthesia, drug and allergy history). Anesthetic plan, risks, benefits, alternatives, and personnel involved discussed with patient. Patient verbalized an understanding and agrees to proceed. Plan discussed with care team members and agreed upon.     Melita Aguirre MD  Staff Anesthesiologist  9:01 AM

## 2021-12-07 ENCOUNTER — HOSPITAL ENCOUNTER (OUTPATIENT)
Age: 46
Setting detail: OUTPATIENT SURGERY
Discharge: HOME | End: 2021-12-07
Attending: ANESTHESIOLOGY | Admitting: ANESTHESIOLOGY
Payer: COMMERCIAL

## 2021-12-07 ENCOUNTER — HOSPITAL ENCOUNTER (OUTPATIENT)
Dept: OPERATING ROOM | Age: 46
Setting detail: OUTPATIENT SURGERY
Discharge: HOME | End: 2021-12-07
Attending: ANESTHESIOLOGY
Payer: COMMERCIAL

## 2021-12-07 ENCOUNTER — ANESTHESIA (OUTPATIENT)
Dept: OPERATING ROOM | Age: 46
End: 2021-12-07
Payer: COMMERCIAL

## 2021-12-07 VITALS
WEIGHT: 143 LBS | BODY MASS INDEX: 25.34 KG/M2 | TEMPERATURE: 97.3 F | DIASTOLIC BLOOD PRESSURE: 79 MMHG | HEIGHT: 63 IN | RESPIRATION RATE: 14 BRPM | SYSTOLIC BLOOD PRESSURE: 135 MMHG | HEART RATE: 77 BPM | OXYGEN SATURATION: 99 %

## 2021-12-07 VITALS
OXYGEN SATURATION: 99 % | DIASTOLIC BLOOD PRESSURE: 87 MMHG | RESPIRATION RATE: 18 BRPM | TEMPERATURE: 98.6 F | SYSTOLIC BLOOD PRESSURE: 136 MMHG

## 2021-12-07 DIAGNOSIS — M47.896 OTHER OSTEOARTHRITIS OF SPINE, LUMBAR REGION: ICD-10-CM

## 2021-12-07 LAB
HCG, URINE, POC: NEGATIVE
Lab: NORMAL
NEGATIVE QC PASS/FAIL: NORMAL
POSITIVE QC PASS/FAIL: NORMAL

## 2021-12-07 PROCEDURE — 7100000010 HC PHASE II RECOVERY - FIRST 15 MIN: Performed by: ANESTHESIOLOGY

## 2021-12-07 PROCEDURE — 6360000002 HC RX W HCPCS: Performed by: NURSE ANESTHETIST, CERTIFIED REGISTERED

## 2021-12-07 PROCEDURE — 64635 DESTROY LUMB/SAC FACET JNT: CPT | Performed by: ANESTHESIOLOGY

## 2021-12-07 PROCEDURE — 3209999900 FLUORO FOR SURGICAL PROCEDURES

## 2021-12-07 PROCEDURE — 6360000002 HC RX W HCPCS: Performed by: ANESTHESIOLOGY

## 2021-12-07 PROCEDURE — 3700000000 HC ANESTHESIA ATTENDED CARE: Performed by: ANESTHESIOLOGY

## 2021-12-07 PROCEDURE — 2709999900 HC NON-CHARGEABLE SUPPLY: Performed by: ANESTHESIOLOGY

## 2021-12-07 PROCEDURE — 3700000001 HC ADD 15 MINUTES (ANESTHESIA): Performed by: ANESTHESIOLOGY

## 2021-12-07 PROCEDURE — 7100000011 HC PHASE II RECOVERY - ADDTL 15 MIN: Performed by: ANESTHESIOLOGY

## 2021-12-07 PROCEDURE — 64636 DESTROY L/S FACET JNT ADDL: CPT | Performed by: ANESTHESIOLOGY

## 2021-12-07 PROCEDURE — 81025 URINE PREGNANCY TEST: CPT | Performed by: ANESTHESIOLOGY

## 2021-12-07 PROCEDURE — 3600000015 HC SURGERY LEVEL 5 ADDTL 15MIN: Performed by: ANESTHESIOLOGY

## 2021-12-07 PROCEDURE — 3600000005 HC SURGERY LEVEL 5 BASE: Performed by: ANESTHESIOLOGY

## 2021-12-07 PROCEDURE — 2500000003 HC RX 250 WO HCPCS: Performed by: ANESTHESIOLOGY

## 2021-12-07 PROCEDURE — 2580000003 HC RX 258: Performed by: ANESTHESIOLOGY

## 2021-12-07 PROCEDURE — C1713 ANCHOR/SCREW BN/BN,TIS/BN: HCPCS | Performed by: ANESTHESIOLOGY

## 2021-12-07 RX ORDER — LIDOCAINE HYDROCHLORIDE 5 MG/ML
INJECTION, SOLUTION INFILTRATION; INTRAVENOUS PRN
Status: DISCONTINUED | OUTPATIENT
Start: 2021-12-07 | End: 2021-12-07 | Stop reason: ALTCHOICE

## 2021-12-07 RX ORDER — FENTANYL CITRATE 50 UG/ML
INJECTION, SOLUTION INTRAMUSCULAR; INTRAVENOUS PRN
Status: DISCONTINUED | OUTPATIENT
Start: 2021-12-07 | End: 2021-12-07 | Stop reason: SDUPTHER

## 2021-12-07 RX ORDER — MIDAZOLAM HYDROCHLORIDE 1 MG/ML
INJECTION INTRAMUSCULAR; INTRAVENOUS PRN
Status: DISCONTINUED | OUTPATIENT
Start: 2021-12-07 | End: 2021-12-07 | Stop reason: SDUPTHER

## 2021-12-07 RX ORDER — LIDOCAINE HYDROCHLORIDE 10 MG/ML
INJECTION, SOLUTION EPIDURAL; INFILTRATION; INTRACAUDAL; PERINEURAL PRN
Status: DISCONTINUED | OUTPATIENT
Start: 2021-12-07 | End: 2021-12-07 | Stop reason: ALTCHOICE

## 2021-12-07 RX ORDER — SODIUM CHLORIDE, SODIUM LACTATE, POTASSIUM CHLORIDE, CALCIUM CHLORIDE 600; 310; 30; 20 MG/100ML; MG/100ML; MG/100ML; MG/100ML
INJECTION, SOLUTION INTRAVENOUS CONTINUOUS
Status: DISCONTINUED | OUTPATIENT
Start: 2021-12-07 | End: 2021-12-07 | Stop reason: HOSPADM

## 2021-12-07 RX ORDER — METHYLPREDNISOLONE ACETATE 40 MG/ML
INJECTION, SUSPENSION INTRA-ARTICULAR; INTRALESIONAL; INTRAMUSCULAR; SOFT TISSUE PRN
Status: DISCONTINUED | OUTPATIENT
Start: 2021-12-07 | End: 2021-12-07 | Stop reason: ALTCHOICE

## 2021-12-07 RX ORDER — BUPIVACAINE HYDROCHLORIDE 2.5 MG/ML
INJECTION, SOLUTION EPIDURAL; INFILTRATION; INTRACAUDAL PRN
Status: DISCONTINUED | OUTPATIENT
Start: 2021-12-07 | End: 2021-12-07 | Stop reason: ALTCHOICE

## 2021-12-07 RX ADMIN — SODIUM CHLORIDE, POTASSIUM CHLORIDE, SODIUM LACTATE AND CALCIUM CHLORIDE: 600; 310; 30; 20 INJECTION, SOLUTION INTRAVENOUS at 09:01

## 2021-12-07 RX ADMIN — FENTANYL CITRATE 50 MCG: 50 INJECTION, SOLUTION INTRAMUSCULAR; INTRAVENOUS at 09:19

## 2021-12-07 RX ADMIN — MIDAZOLAM 2 MG: 1 INJECTION INTRAMUSCULAR; INTRAVENOUS at 09:18

## 2021-12-07 RX ADMIN — FENTANYL CITRATE 50 MCG: 50 INJECTION, SOLUTION INTRAMUSCULAR; INTRAVENOUS at 09:21

## 2021-12-07 ASSESSMENT — PAIN SCALES - GENERAL
PAINLEVEL_OUTOF10: 0

## 2021-12-07 ASSESSMENT — PAIN - FUNCTIONAL ASSESSMENT: PAIN_FUNCTIONAL_ASSESSMENT: 0-10

## 2021-12-07 NOTE — H&P
83[address]) performed by Nhi Ace MD at [address] 10/12/2021    [address] [place] [address] Genius Pack BLOCK L2,L3,L4 AND ,L5 DORSAL RAMI WITH XRAY  (CPT 96234) performed by Nhi Ace MD at [place] [address] Right 09/10/2018    laser upper eyelid    GA OFFICE/OUTPT VISIT,PROCEDURE ONLY Right 9/10/2018    ER YAG LASER ABLATION RIGHT UPPER EYELID SYMPTOMATIC performed by Marifer Wheatley MD at [facility] Right 5/14/2014    Arthroplasty, Shoulder    ROTATOR CUFF REPAIR Right        Prior to Admission medications    Medication Sig Start Date End Date Taking? Authorizing Provider   metoprolol succinate (TOPROL XL) 25 MG extended release tablet TAKE 1 TABLET BY MOUTH NIGHTLY 11/9/20  Yes Elizabeth Cordero MD   medroxyPROGESTERone (DEPO-PROVERA) 150 MG/ML injection Inject 150 mg into the muscle once. Every 12 weeks   Yes Historical Provider, MD       No Known Allergies    Social History     Socioeconomic History    Marital status:      Spouse name: Not on file    Number of children: Not on file    Years of education: Not on file    Highest education level: Not on file   Occupational History    Not on file   Tobacco Use    Smoking status: Current Every Day Smoker     Packs/day: 0.50     Years: 15.00     Pack years: 7.50     Types: Cigarettes    Smokeless tobacco: Never Used   Vaping Use    Vaping Use: Never used   Substance and Sexual Activity    Alcohol use:  Yes     Alcohol/week: 0.0 standard drinks     Comment: infrequent social wine    Drug use: No    Sexual activity: Not Currently     Partners: Male     Birth control/protection: Injection   Other Topics Concern    Not on file   Social History Narrative    Not on file     Social Determinants of Health     Financial Resource Strain:     Difficulty of Paying Living Expenses: Not on file   Food Insecurity:     Worried About Running Out of Food in the Last Year: Not on file    Ran Out of Food in the Last Year: Not on file   Transportation Needs:     Lack of Transportation (Medical): Not on file    Lack of Transportation (Non-Medical): Not on file   Physical Activity:     Days of Exercise per Week: Not on file    Minutes of Exercise per Session: Not on file   Stress:     Feeling of Stress : Not on file   Social Connections:     Frequency of Communication with Friends and Family: Not on file    Frequency of Social Gatherings with Friends and Family: Not on file    Attends Restorationist Services: Not on file    Active Member of [employer] or Organizations: Not on file    Attends Club or Organization Meetings: Not on file    Marital Status: Not on file   Intimate Partner Violence:     Fear of Current or Ex-Partner: Not on file    Emotionally Abused: Not on file    Physically Abused: Not on file    Sexually Abused: Not on file   Housing Stability:     Unable to Pay for Housing in the Last Year: Not on file    Number of [place] in the Last Year: Not on file    Unstable Housing in the Last Year: Not on file       Family History   Problem Relation Age of Onset    Migraines Mother     High Blood Pressure Mother     Migraines Son     Cancer Father     Heart Disease Father     Cancer Sister          REVIEW OF SYSTEMS:    CONSTITUTIONAL:  negative for  fevers, chills, sweats and fatigue    RESPIRATORY:  negative for  dry cough, cough with sputum, dyspnea, wheezing and chest pain    CARDIOVASCULAR:  negative for chest pain, dyspnea, palpitations, syncope    GASTROINTESTINAL:  negative for nausea, vomiting, change in bowel habits, diarrhea, constipation and abdominal pain    MUSCULOSKELETAL: negative for muscle weakness    SKIN: negative for itching or rashes.     BEHAVIOR/PSYCH:  negative for poor appetite, increased appetite, decreased sleep and poor concentration    All other systems negative      PHYSICAL EXAM:    VITALS:  /69   Pulse 89   Temp 97.3 °F (36.3 °C) (Temporal)   Resp 16   Ht 5' 3\" (1.6 m)   Wt 143 lb (64.9 kg)   SpO2 98%   BMI 25.33 kg/m²     CONSTITUTIONAL:  awake, alert, cooperative, no apparent distress, and appears stated age    EYES: PERRLA, EOMI    LUNGS:  No increased work of breathing, no audible wheezing    CARDIOVASCULAR:  regular rate and rhythm    ABDOMEN:  Soft non tender non distended     EXTREMITIES: no signs of clubbing or cyanosis. MUSCULOSKELETAL: negative for flaccid muscle tone or spastic movements. SKIN: gross examination reveals no signs of rashes, or diaphoresis. NEURO: Cranial nerves II-XII grossly intact. No signs of agitated mood. Assessment/Plan:    Patient  is here for Lumbar MB RFA for low back pain. The patient was counseled at length about the risks of pat Covid-19 during their perioperative period and any recovery window from their procedure. The patient was made aware that pat Covid-19  may worsen their prognosis for recovering from their procedure  and lend to a higher morbidity and/or mortality risk. All material risks, benefits, and reasonable alternatives including postponing the procedure were discussed. The patient does wish to proceed with the procedure at this time.       Maday Bowman MD

## 2021-12-07 NOTE — ANESTHESIA POSTPROCEDURE EVALUATION
Department of Anesthesiology  Postprocedure Note    Patient: Dar Oleary  MRN: [id number]  YOB: 1975  Date of evaluation: 12/7/2021  Time:  11:28 AM     Procedure Summary     Date: 12/07/21 Room / Location: [address] [place] [address] 04 / [facility]    Anesthesia Start: 3600 Anesthesia Stop: 1538    Procedure: BILATERAL LUMBAR RADIOFREQUENCY ABLATION UNDER FLUOROSCOPIC GUIDANCE AT L2, L3, L4 AND L5 WITH SEDATION (CPT 42872) (Bilateral ) Diagnosis: (LUMBOSACRAL SPONDYLOSIS WITHOUT MYELOPATHY)    Surgeons: Tanesha Whitlock MD Responsible Provider: Melinda Veloz MD    Anesthesia Type: MAC ASA Status: 3          Anesthesia Type: MAC    Ghazala Phase I: Ghazala Score: 10    Ghazala Phase II: Ghazala Score: 10    Last vitals: Reviewed and per EMR flowsheets.        Anesthesia Post Evaluation    Patient location during evaluation: PACU  Patient participation: complete - patient participated  Level of consciousness: awake and alert  Airway patency: patent  Nausea & Vomiting: no nausea and no vomiting  Complications: no  Cardiovascular status: hemodynamically stable  Respiratory status: acceptable  Hydration status: euvolemic

## 2021-12-07 NOTE — OP NOTE
Operative Note      Patient: Homero Baca  YOB: 1975  MRN: [id number]    Date of Procedure: 2021    Pre-Op Diagnosis: LUMBOSACRAL SPONDYLOSIS WITHOUT MYELOPATHY    Post-Op Diagnosis: Same       Procedure(s):  BILATERAL LUMBAR RADIOFREQUENCY ABLATION UNDER FLUOROSCOPIC GUIDANCE AT L2, L3, L4 AND L5 WITH SEDATION     Surgeon(s):  Pushpa Noland MD    Assistant:   * No surgical staff found *    Anesthesia: Monitor Anesthesia Care    Estimated Blood Loss (mL): Minimal    Complications: None    Specimens:   * No specimens in log *    Implants:  * No implants in log *      Drains: * No LDAs found *    Findings: good needle placement    Detailed Description of Procedure:   2021    Patient: Homero Baca  :  1975  Age:  40 y.o. Sex:  female     PRE-OPERATIVE DIAGNOSIS: Bilateral Lumbar spondylosis, lumbar facet arthropathy. POST-OPERATIVE DIAGNOSIS: Same. PROCEDURE:  Fluoroscopic-guided Bilateral  Lumbar facet medial branch radiofrequency ablation at facet levels L3-4, L4-5, L5-S1( L2, L3, L4 MB & L5DR)    SURGEON:  Pushpa Noland MD    ANESTHESIA: MAC    ESTIMATED BLOOD LOSS: None.  ______________________________________________________________________  HISTORY & PHYSICAL: Homero Baca presents today for fluoroscopic-guided Bilateral lumbar facet medial branch radiofrequency ablation at levels L3-4, L4-5, L5-S1. The potential complications of the procedure were explained to [facility] 73. again today and she has elected to undergo the aforementioned procedure. Francisco complete History & Physical examination were reviewed in depth, a copy of which is in the chart. DESCRIPTION OF PROCEDURE:    After confirming written and informed consent, a time-out was performed and Francisco name and date of birth, the procedure to be performed as well as the plan for the location of the needle insertion were confirmed.     The patient was brought into the procedure room and placed in the prone position on the fluoroscopy table. Standard monitors were placed and vital signs were observed throughout the procedure. The area of the lumbar spine and upper buttocks was sterilely prepped with chloraprep and draped in a sterile manner. AP fluoroscopy was used to identify and meghan bartons point at the targeted area. # 20 gauge 10 mm radiofrequency probe was advanced toward each of these points. Once bone was contacted, negative aspiration for blood and CSF was confirmed, sensory stimulation was performed at 50 Hz and at 0.4 volts generating a pressure sensation. Motor stimulation < 2.0 volts elicited multifidus twitching without any radicular symptoms. 1 ml of solution of 1% lidocaine + 0.25% Marcaine was injected prior to lesioning at each level, which was performed for 90 seconds at 90 degrees centigrade. Once the lesions were complete, a solution of 0.25% marcaine 0.5 cc and 5mg DepoMedrol was injected through each probe. The probes were removed. The patient's back was cleaned and bandages were placed over the needle insertion sites. Disposition the patient tolerated the procedure well and there were no complications . Vital signs remained stable throughout the procedure. The patient was escorted to the recovery area where they remained until discharge and written discharge instructions for the procedure were given. Plan: Jonna Larsen will return to our pain management center as scheduled.      Stacy Song MD

## 2021-12-13 ENCOUNTER — TELEPHONE (OUTPATIENT)
Dept: PAIN MANAGEMENT | Age: 46
End: 2021-12-13

## 2021-12-13 RX ORDER — METHOCARBAMOL 500 MG/1
500 TABLET, FILM COATED ORAL 2 TIMES DAILY PRN
Qty: 30 TABLET | Refills: 0 | Status: SHIPPED | OUTPATIENT
Start: 2021-12-13 | End: 2021-12-28

## 2022-01-07 ENCOUNTER — VIRTUAL VISIT (OUTPATIENT)
Dept: PAIN MANAGEMENT | Age: 47
End: 2022-01-07
Payer: COMMERCIAL

## 2022-01-07 DIAGNOSIS — M47.817 LUMBOSACRAL SPONDYLOSIS WITHOUT MYELOPATHY: Primary | ICD-10-CM

## 2022-01-07 DIAGNOSIS — F17.200 SMOKING: ICD-10-CM

## 2022-01-07 DIAGNOSIS — M79.2 NEURALGIA AND NEURITIS: ICD-10-CM

## 2022-01-07 DIAGNOSIS — M53.3 SACROILIAC DYSFUNCTION: ICD-10-CM

## 2022-01-07 DIAGNOSIS — M51.36 DDD (DEGENERATIVE DISC DISEASE), LUMBAR: ICD-10-CM

## 2022-01-07 PROCEDURE — G8427 DOCREV CUR MEDS BY ELIG CLIN: HCPCS | Performed by: ANESTHESIOLOGY

## 2022-01-07 PROCEDURE — 99214 OFFICE O/P EST MOD 30 MIN: CPT | Performed by: ANESTHESIOLOGY

## 2022-01-07 PROCEDURE — 4004F PT TOBACCO SCREEN RCVD TLK: CPT | Performed by: ANESTHESIOLOGY

## 2022-01-07 PROCEDURE — G8419 CALC BMI OUT NRM PARAM NOF/U: HCPCS | Performed by: ANESTHESIOLOGY

## 2022-01-07 PROCEDURE — G8484 FLU IMMUNIZE NO ADMIN: HCPCS | Performed by: ANESTHESIOLOGY

## 2022-01-07 RX ORDER — NABUMETONE 750 MG/1
750 TABLET, FILM COATED ORAL 2 TIMES DAILY PRN
Qty: 60 TABLET | Refills: 1 | Status: SHIPPED | OUTPATIENT
Start: 2022-01-07

## 2022-01-07 NOTE — PROGRESS NOTES
[address] [place] [address], [address] [place] [address] Rhett  [phone]    Tele health follow up Note      Armando Rivas     Date of Visit:  1/7/2022    CC:  Tele health follow up   Chief Complaint   Patient presents with    Follow-up    Back Pain       Consent:  Telehealth Visit due to Eltonelaine 19 pandemic  The patient and/or health care decision maker is aware that he/she may receive a bill for this tele-health service Doxy Me, depending on his insurance coverage, and has provided verbal consent to proceed: Yes  I have considered the risks of abuse, dependence, addiction and diversion. My patient is aware that they will need a follow-up visit (in-person or virtually) at the appropriate time indicated for continued medications. Further, my patient is aware that when this acute crisis has lifted, they will be expected to return for an in-person visit and all elements of standard local and hospital guidelines in order to continue this medication. Patient Location:Home   Physician Location: Other address in [place]  Who helped the patient with the visit: none  Time documentation:  Not billed  by time   Platform used:  [email or web address] me      HPI:  Chronic low back pain. Prior treatment at [facility] - had interventions, was on pain meds. Apparently did not want to take pain pills and she stopped going there. S/P Lumbar MB RFA on 12/7/2021- excellent pain relief. Some sin sensitivity and some pain over the SIJ area. Pain is better. Appropriate analgesia with current medications regimen: N/A. Change in quality of symptoms:no. Medication side effects:none.        Previous treatments:   Physical Therapy : yes in the past and has continued regular PT directed HEP for the past 6 months.      Medications: - NSAID's : yes                        - Membrane stabilizers : yes                        - Opioids : yes, was on Norco in the past                       - Adjuvants or Others : yes,     Surgeries: no LS spine surgery      She has not been on anticoagulation medications      She has not been on herbal supplements.       She is not diabetic.     H/O Smoking: yes  H/O alcohol abuse : denies  H/O Illicit drug use : denies     Employment: employed     Imaging:      MRI of LS spine: 5/14/2021:      FINDINGS:   BONES/ALIGNMENT: There is normal alignment of the lumbar spine. There is no   acute fracture or listhesis. Bone marrow signal intensity is normal. There is   disc desiccation and mild disc space narrowing at L5-S1. there is no   spondylolysis.       SPINAL CORD: The conus medullaris is normal in size and signal intensities   and terminates normally.       SOFT TISSUES: No paraspinal mass is identified.       L1-L2: There is no disc bulge or protrusion present.  There is no significant   spinal canal stenosis or neural foraminal narrowing present.       L2-L3: There is no disc bulge or protrusion present.  There is no significant   spinal canal stenosis or neural foraminal narrowing present.       L3-L4: There is no disc bulge or protrusion present.  There is no significant   spinal canal stenosis or neural foraminal narrowing present.       L4-L5: There is no disc bulge or protrusion present.  There is no significant   spinal canal stenosis or neural foraminal narrowing present.  There is mild   bilateral facet arthropathy.       L5-S1: There has been interval increase in size of a right paracentral disc   protrusion, now measuring 6 mm in AP dimension.  This posterior displaces the   right S1 nerve roots, effacing the right lateral recess.  There is a   concomitant disc bulge mildly narrowing both neural foramen.  There is no   significant spinal canal stenosis.  Mild bilateral facet arthropathy is   present.           Impression   1.  Interval increase in size of a right paracentral disc protrusion at L5-S1,   posterior displacing the right S1 nerve roots and effacing the right lateral recess.    2. Concomitant disc bulge and facet arthropathy at L5-S1 mildly narrowing   both neural foramen.         X-ray LS spine and Xray HIPS: 3/30/2021:  FINDINGS:   L-spine: Mild-to-moderate degenerative disc disease at L5-S1.  Moderate   degenerative facet arthropathy at this level as well.  No fracture or   dislocation.  Normal soft tissues.       Pelvis and bilateral hips: No fracture or dislocation.  Hip joint spaces are   relatively preserved.  Normal soft tissues.           Impression   L-spine: Degenerative spondylotic changes primarily at L5-S1.       Unremarkable pelvis and bilateral hips.                                            Potential Aberrant Drug-Related Behavior:  no     Urine Drug Screening:     OARRS report[de-identified]  Reviewed today- not on chronic opioids    Past Medical History: Reviewed  Past Medical History:   Diagnosis Date    Bulging lumbar disc     Compression of nerve     Headache(784.0)     Insomnia     Tachycardia     takes metoprolol for irreg HR        Past Surgical History: Reviewed   Past Surgical History:   Procedure Laterality Date    BICEPS TENDON REPAIR Right     BREAST ENHANCEMENT SURGERY Bilateral     BREAST SURGERY  augmentation    HEMORROIDECTOMY N/A 5/7/2019    EXAM UNDER ANESTHESIA HEMORRHOIDECTOMY performed by [facility] at [facility] ARTHROSCOPY  right    MEDICATION INJECTION Left 7/26/2021    LEFT SACROILIAC JOINT INJECTION UNDER FLUOROSCOPY performed by Shu Bray MD at [address] [facility] [address] [facility] [address] Right 10 12 2015    lumbar right transforaminal nerve block #1 l4-5 l5-s1    NERVE BLOCK Right 10 22 2015    Transforaminal lumbar nerve block #2    NERVE BLOCK Right 11 02 2015    transforaminal nerve block right lumbar #3    NERVE BLOCK  11/11/15    sacroiliac joint right #1    NERVE BLOCK  11/18/15    sacroiliac joint right #2    NERVE BLOCK  11/25/15    sacroiliac joint right #3    NERVE BLOCK Right 1 18 15    hip inj #1    NERVE BLOCK Right 02/01/16    hip injection #2    NERVE BLOCK Bilateral 07/25/2016    Bilateral paravertebral facet lumbar #1    NERVE BLOCK Bilateral 08/17/2016    lumbar paravertebral facet #2    NERVE BLOCK Bilateral 5/6/2021    # 1 BILATERAL LUMBAR MEDIAL BRANCH NERVE BLOCK UNDER FLUOROSCOPIC GUIDANCE AT L2, L3, L4 AND L5 DORSAL RAMI performed by Radha Quintero MD at [address] [facility] [address] Right 6/22/2021    LUMBAR TRANSFORAMINAL EPIDURAL STEROID INJECTION RIGHT L5 AND S1 UNDER FLUOROSCOPIC GUIDANCE (CPT 21283) performed by Radha Quintero MD at [address] 10/12/2021    [facility] L2,L3,L4 AND ,L5 DORSAL RAMI WITH XRAY  (CPT 03489) performed by Radha Quintero MD at [place] [address] Right 09/10/2018    laser upper eyelid    PAIN MANAGEMENT PROCEDURE Bilateral 12/7/2021    BILATERAL LUMBAR RADIOFREQUENCY ABLATION UNDER FLUOROSCOPIC GUIDANCE AT L2, L3, L4 AND L5 WITH SEDATION (CPT 96399) performed by Radha Quintero MD at [address] [place] [address] OFFICE/OUTPT [address] [place] [address] Right 9/10/2018    ER YAG LASER ABLATION RIGHT UPPER EYELID SYMPTOMATIC performed by Alejandra Brantley MD at [address] [place] [address] Right 5/14/2014    Arthroplasty, Shoulder    ROTATOR CUFF REPAIR Right        Home Medications: Reviewed    Allergies: Reviewed     Social History: Reviewed     REVIEW OF SYSTEMS:     Bette Morgan denies fever/chills, chest pain, shortness of breath, new bowel or bladder complaints. All other review of systems was negative.     PHYSICAL EXAMINATION:      General:       A & O x3    HEENT:    Head:normocephalic and atraumatic    Lungs:    Breathing: Appears normal.    Cervical spine:    Inspection:normal    Lumbar spine:    Range of motion:reduced    Extremities:    Tremors:None     Neurological:     Motor:          No apparent new weakness per patient         Dermatology:    Skin: no unusual rashes    ASSESSMENT AND PLAN:     Diagnosis Orders   1. Lumbosacral spondylosis without myelopathy  Amb External Referral To Physical Therapy   2. DDD (degenerative disc disease), lumbar     3. Sacroiliac dysfunction     4. Smoking     5. Neuralgia and neuritis       40 y. o.  Kuldip Olivares H/o chronic low back pain. Prior treatment at [facility]- meds/ interventions. Failed conservative treatment for > 6 weeks. MRI of LS spine- L5-S1 right paracentral disc protrusion and facet changes noted. TFESI - excellent pain relief of radicular pain. S/P Lumbar MB RFA on 12/7/2021- excellent pain relief of the facet pain > 90% improvement. Some skin sensitivity noted. Has noticed some right SIJ pain. Continues HEP. PLAN    Physical therapy prescribed today. Relafen for prn use: E-scribed. Compound cream for local use over the sensitive area. Diclofenac, Gabapentin, Baclofen, Lidocaine, Doxepin. Apply 1-2 grams TID over the painful area. Dispense #240 gram with X 2 refills. Use instructions and side effects explained. If pain persists, consider right SIJ injection discussed about this. Can call our office if she needs to set up. Smoking cessation counseling done. Discussed the importance of smoking cessation on spine health. Counseling done regarding: HEP, appropriate medication use, smoking cessation and treatment plan. F/U in 2-3 months. I affirm this is a Patient Initiated Episode with an established Patient who has not had a related appointment within my department in the past 7 days or scheduled within the next 24 hours.     Shu Bray MD    CC:  Muna Hale DO

## 2022-01-07 NOTE — PROGRESS NOTES
Clari Naidu was read the following message We want to confirm that, for purposes of billing, this is a virtual visit with your provider for which we will submit a claim for reimbursement with your insurance company. You will be responsible for any copays, coinsurance amounts or other amounts not covered by your insurance company. If you do not accept this, unfortunately we will not be able to schedule or proceed with a virtual visit with the provider. Do you accept? Cb responded Yes .

## 2022-04-08 ENCOUNTER — OFFICE VISIT (OUTPATIENT)
Dept: PAIN MANAGEMENT | Age: 47
End: 2022-04-08
Payer: COMMERCIAL

## 2022-04-08 VITALS
HEIGHT: 63 IN | RESPIRATION RATE: 16 BRPM | SYSTOLIC BLOOD PRESSURE: 120 MMHG | TEMPERATURE: 97.7 F | OXYGEN SATURATION: 99 % | DIASTOLIC BLOOD PRESSURE: 80 MMHG | WEIGHT: 143 LBS | BODY MASS INDEX: 25.34 KG/M2 | HEART RATE: 102 BPM

## 2022-04-08 DIAGNOSIS — M53.3 SACROILIAC DYSFUNCTION: ICD-10-CM

## 2022-04-08 DIAGNOSIS — M47.817 LUMBOSACRAL SPONDYLOSIS WITHOUT MYELOPATHY: Primary | ICD-10-CM

## 2022-04-08 DIAGNOSIS — M51.36 DDD (DEGENERATIVE DISC DISEASE), LUMBAR: ICD-10-CM

## 2022-04-08 PROCEDURE — G8419 CALC BMI OUT NRM PARAM NOF/U: HCPCS | Performed by: ANESTHESIOLOGY

## 2022-04-08 PROCEDURE — G8427 DOCREV CUR MEDS BY ELIG CLIN: HCPCS | Performed by: ANESTHESIOLOGY

## 2022-04-08 PROCEDURE — 99213 OFFICE O/P EST LOW 20 MIN: CPT | Performed by: ANESTHESIOLOGY

## 2022-04-08 PROCEDURE — 4004F PT TOBACCO SCREEN RCVD TLK: CPT | Performed by: ANESTHESIOLOGY

## 2022-04-08 PROCEDURE — 99214 OFFICE O/P EST MOD 30 MIN: CPT | Performed by: ANESTHESIOLOGY

## 2022-04-08 RX ORDER — MEDROXYPROGESTERONE ACETATE 150 MG/ML
INJECTION, SUSPENSION INTRAMUSCULAR
COMMUNITY
Start: 2022-02-27

## 2022-04-08 RX ORDER — IBUPROFEN 200 MG
200 TABLET ORAL EVERY 6 HOURS PRN
COMMUNITY

## 2022-04-08 RX ORDER — CYCLOBENZAPRINE HCL 5 MG
5 TABLET ORAL 2 TIMES DAILY PRN
Qty: 30 TABLET | Refills: 1 | Status: SHIPPED | OUTPATIENT
Start: 2022-04-08 | End: 2022-05-08

## 2022-04-08 RX ORDER — METHYLPREDNISOLONE 4 MG/1
TABLET ORAL
Qty: 1 KIT | Refills: 0 | Status: SHIPPED | OUTPATIENT
Start: 2022-04-08 | End: 2022-04-14

## 2022-04-08 NOTE — PROGRESS NOTES
Do you currently have any of the following:    Fever: No  Headache:  No  Cough: No  Shortness of breath: No  Exposed to anyone with these symptoms: No                                                                                                                Cb Ham presents to the [facility] on 4/8/2022. Javon Salazar is complaining of pain in lower back and bilateral pelvic. . The pain is constant. The pain is described as aching, throbbing, shooting, stabbing and sharp. Pain is rated on her best day at a 6, on her worst day at a 8, and on average at a 7 on the VAS scale. She took her last dose of Relafen a month ago, not helping so she stopped it taking Ibuprofen 3-4 tablets at one time every 6 hours. . Javon Salazar does not have issues with constipation. Any procedures since your last visit: No,     She is  on NSAIDS and  is not on anticoagulation medications to include none and is managed by NA. Pacemaker or defibrillator: No Physician managing device is NA. Medication Contract and Consent for Opioid Use Documents Filed      No documents found                   /80   Pulse 102   Temp 97.7 °F (36.5 °C) (Infrared)   Resp 16   Ht 5' 3\" (1.6 m)   Wt 143 lb (64.9 kg)   SpO2 99%   BMI 25.33 kg/m²      No LMP recorded. Patient has had an injection.

## 2022-04-08 NOTE — PROGRESS NOTES
[address]  2024 [facility], [address] [place] [address] [place]  [phone]    Follow up Note      Ander Franco     Date of Visit:  4/8/2022    CC:  Patient presents for follow up   Chief Complaint   Patient presents with    3 Month Follow-Up     patient doing PT       HPI:  Chronic low back pain. Prior treatment at [facility] - had interventions, was on pain meds. Apparently did not want to take pain pills and she stopped going there.     Failed conservative treatment. Pain causes functional limitations/ limits Adl's : Yes      Nursing notes and details of the pain history reviewed. Please see intake notes for details. S/p Right Lumbar TFESI > 90% pain relief of the LE pain. S/P Facet interventions with good pain relief. Has low back pain. Doing HEP. Previous treatments:   Physical Therapy : yes in the past and has continued regular PT directed HEP for the past 6 months.      Medications: - NSAID's : yes                        - Membrane stabilizers : yes                        - Opioids : yes, was on Norco in the past                       - Adjuvants or Others : yes,     Surgeries: no LS spine surgery      She has not been on anticoagulation medications      She has not been on herbal supplements.       She is not diabetic.     H/O Smoking: yes  H/O alcohol abuse : denies  H/O Illicit drug use : denies     Employment: employed     Imaging:     MRI of LS spine: 5/14/2021:     FINDINGS:   BONES/ALIGNMENT: There is normal alignment of the lumbar spine. There is no   acute fracture or listhesis.  Bone marrow signal intensity is normal. There is   disc desiccation and mild disc space narrowing at L5-S1. there is no   spondylolysis.       SPINAL CORD: The conus medullaris is normal in size and signal intensities   and terminates normally.       SOFT TISSUES: No paraspinal mass is identified.       L1-L2: There is no disc bulge or protrusion present.  There is no significant spinal canal stenosis or neural foraminal narrowing present.       L2-L3: There is no disc bulge or protrusion present.  There is no significant   spinal canal stenosis or neural foraminal narrowing present.       L3-L4: There is no disc bulge or protrusion present.  There is no significant   spinal canal stenosis or neural foraminal narrowing present.       L4-L5: There is no disc bulge or protrusion present.  There is no significant   spinal canal stenosis or neural foraminal narrowing present.  There is mild   bilateral facet arthropathy.       L5-S1: There has been interval increase in size of a right paracentral disc   protrusion, now measuring 6 mm in AP dimension.  This posterior displaces the   right S1 nerve roots, effacing the right lateral recess.  There is a   concomitant disc bulge mildly narrowing both neural foramen.  There is no   significant spinal canal stenosis.  Mild bilateral facet arthropathy is   present.           Impression   1. Interval increase in size of a right paracentral disc protrusion at L5-S1,   posterior displacing the right S1 nerve roots and effacing the right lateral   recess. 2. Concomitant disc bulge and facet arthropathy at L5-S1 mildly narrowing   both neural foramen.        X-ray LS spine and Xray HIPS: 3/30/2021:  FINDINGS:   L-spine: Mild-to-moderate degenerative disc disease at L5-S1.  Moderate   degenerative facet arthropathy at this level as well.  No fracture or   dislocation.  Normal soft tissues.       Pelvis and bilateral hips: No fracture or dislocation.  Hip joint spaces are   relatively preserved.  Normal soft tissues.           Impression   L-spine: Degenerative spondylotic changes primarily at L5-S1.       Unremarkable pelvis and bilateral hips.           Potential Aberrant Drug-Related Behavior:  no    Urine Drug Screening:    OARRS report[de-identified]  Reviewed today- not on chronic opioids    Past Medical History:   Diagnosis Date    Bulging lumbar disc     Compression of nerve     Headache(784.0)     Insomnia     Tachycardia     takes metoprolol for irreg HR        Past Surgical History:   Procedure Laterality Date    BICEPS TENDON REPAIR Right     BREAST ENHANCEMENT SURGERY Bilateral     BREAST SURGERY  augmentation    HEMORROIDECTOMY N/A 5/7/2019    EXAM UNDER ANESTHESIA HEMORRHOIDECTOMY performed by Sanchez Lo MD at [facility] ARTHROSCOPY  right    MEDICATION INJECTION Left 7/26/2021    LEFT SACROILIAC JOINT INJECTION UNDER FLUOROSCOPY performed by Gailen Cockayne, MD at [address] Right 10 12 2015    lumbar right transforaminal nerve block #1 l4-5 l5-s1    NERVE BLOCK Right 10 22 2015    Transforaminal lumbar nerve block #2    NERVE BLOCK Right 11 02 2015    transforaminal nerve block right lumbar #3    NERVE BLOCK  11/11/15    sacroiliac joint right #1    NERVE BLOCK  11/18/15    sacroiliac joint right #2    NERVE BLOCK  11/25/15    sacroiliac joint right #3    NERVE BLOCK Right 1 18 15    hip inj #1    NERVE BLOCK Right 02/01/16    hip injection #2    NERVE BLOCK Bilateral 07/25/2016    Bilateral paravertebral facet lumbar #1    NERVE BLOCK Bilateral 08/17/2016    lumbar paravertebral facet #2    NERVE BLOCK Bilateral 5/6/2021    # 1 BILATERAL LUMBAR MEDIAL BRANCH NERVE BLOCK UNDER FLUOROSCOPIC GUIDANCE AT L2, L3, L4 AND L5 DORSAL RAMI performed by Gailen Cockayne, MD at [address] Right 6/22/2021    LUMBAR TRANSFORAMINAL EPIDURAL STEROID INJECTION RIGHT L5 AND S1 UNDER FLUOROSCOPIC GUIDANCE (CPT 49483) performed by Gailen Cockayne, MD at [facility] N/A 10/12/2021    BILATERL LUMBAR MEDIAL BRANCH NERVE BLOCK L2,L3,L4 AND ,L5 DORSAL RAMI WITH XRAY  (CPT K6473954) performed by Gailen Cockayne, MD at [address] Right 09/10/2018    laser upper eyelid    PAIN MANAGEMENT PROCEDURE Bilateral 12/7/2021    BILATERAL LUMBAR RADIOFREQUENCY ABLATION Financial Resource Strain:     Difficulty of Paying Living Expenses: Not on file   Food Insecurity:     Worried About Running Out of Food in the Last Year: Not on file    Judy of Food in the Last Year: Not on file   Transportation Needs:     Lack of Transportation (Medical): Not on file    Lack of Transportation (Non-Medical): Not on file   Physical Activity:     Days of Exercise per Week: Not on file    Minutes of Exercise per Session: Not on file   Stress:     Feeling of Stress : Not on file   Social Connections:     Frequency of Communication with Friends and Family: Not on file    Frequency of Social Gatherings with Friends and Family: Not on file    Attends Temple Services: Not on file    Active Member of [address] [place] [address] [employer] or Organizations: Not on file    Attends Club or Organization Meetings: Not on file    Marital Status: Not on file   Intimate Partner Violence:     Fear of Current or Ex-Partner: Not on file    Emotionally Abused: Not on file    Physically Abused: Not on file    Sexually Abused: Not on file   Housing Stability:     Unable to Pay for Housing in the Last Year: Not on file    Number of [place] in the Last Year: Not on file    Unstable Housing in the Last Year: Not on file       Family History   Problem Relation Age of Onset    Migraines Mother     High Blood Pressure Mother     Migraines Son     Cancer Father     Heart Disease Father     Cancer Sister        REVIEW OF SYSTEMS:     Martha Garcia denies fever/chills, chest pain, shortness of breath, new bowel or bladder complaints. All other review of systems was negative.     PHYSICAL EXAMINATION:      /80   Pulse 102   Temp 97.7 °F (36.5 °C) (Infrared)   Resp 16   Ht 5' 3\" (1.6 m)   Wt 143 lb (64.9 kg)   SpO2 99%   BMI 25.33 kg/m²   General:       General appearance:  Pleasant and well-hydrated, in no distress and A & O x 3  Build:Normal Weight  Function: Rises from seated position easily and Moves about room without difficulty     HEENT:     Head:normocephalic, atraumatic     Lungs:     Breathing:normal breathing pattern      CVS:     RRR     Abdomen:     Shape:non-distended and normal     Cervical spine:     Inspection:normal    Thoracic spine:                Spine inspection:normal        Lumbar spine:      Spine inspection: Normal   Palpation: Tenderness paravertebral muscles Yes bilaterally  Range of motion: Decreased, flexion Decreased, Lateral bending, extension and rotation bilaterally reduced is painful. Lumbar facet loading + b/l  Sacroiliac joint tenderness improved pain  Piriformis tenderness: negative bilaterally  SLR : negative bilaterally     Musculoskeletal:     Trigger points no     Extremities:     Tremors:None bilaterally upper and lower  Edema:none x all 4 extremities     Neurological:     Sensory: Normal to light touch      Motor:                   Right Quadriceps 5/5          Left Quadriceps 5/5           Right Gastrocnemius 5/5    Left Gastrocnemius 5/5  Right Ant Tibialis 5/5  Left Ant Tibialis 5/5      Gait:normal Yes     Dermatology:     Skin:no rashes or lesions noted     Assessment/Plan:       Diagnosis Orders   1. Lumbosacral spondylosis without myelopathy      2. DDD (degenerative disc disease), lumbar      3. Sacroiliac dysfunction      4. Smoking            40 y.o.  female with H/o chronic low back pain for over 10 yrs.     Prior treatment at [facility]- meds/ interventions. Failed conservative treatment for > 6 weeks. MRI of LS spine- L5-S1 right paracentral disc protrusion and facet changes noted. TFESI - excellent pain relief of radicular pain. S/P Radiofrequency ablation On 12/7/2021 of lumbar MBNB for axial low back pain. Completed 2 months of PT recently - on march 30th 2022. Has noted recurrence of low back pain. Medrol dose pack. Flexeril for prn use    NSAID's for prn use. Will prescribe compound cream.   Diclofenac, Gabapentin, Baclofen, Lidocaine, Doxepin. Apply 1-2 grams TID over the painful area. Dispense #240 gram with X 2 refills. Use instructions and side effects explained. F/U in 2 months- consider TPI VS repeat RFA.     Counseling : Patient encouraged to stay active and to continue Regular home exercise program as tolerated - stretching / strengthening.     Smoking cessation counseling : yes      Treatment plan discussed with the patient including medication and procedure side effects.      Arnel Parrish MD    CC:  Molly Stockton DO

## 2022-08-15 ENCOUNTER — OFFICE VISIT (OUTPATIENT)
Dept: FAMILY MEDICINE CLINIC | Age: 47
End: 2022-08-15
Payer: COMMERCIAL

## 2022-08-15 VITALS
DIASTOLIC BLOOD PRESSURE: 62 MMHG | WEIGHT: 141 LBS | OXYGEN SATURATION: 98 % | HEIGHT: 63 IN | HEART RATE: 78 BPM | RESPIRATION RATE: 18 BRPM | BODY MASS INDEX: 24.98 KG/M2 | SYSTOLIC BLOOD PRESSURE: 112 MMHG | TEMPERATURE: 98.1 F

## 2022-08-15 DIAGNOSIS — R68.89 HEAT INTOLERANCE: ICD-10-CM

## 2022-08-15 DIAGNOSIS — R55 SYNCOPE, UNSPECIFIED SYNCOPE TYPE: Primary | ICD-10-CM

## 2022-08-15 DIAGNOSIS — R55 SYNCOPE, UNSPECIFIED SYNCOPE TYPE: ICD-10-CM

## 2022-08-15 LAB — TSH SERPL DL<=0.05 MIU/L-ACNC: 1.27 UIU/ML (ref 0.27–4.2)

## 2022-08-15 PROCEDURE — G8427 DOCREV CUR MEDS BY ELIG CLIN: HCPCS | Performed by: FAMILY MEDICINE

## 2022-08-15 PROCEDURE — 99213 OFFICE O/P EST LOW 20 MIN: CPT | Performed by: FAMILY MEDICINE

## 2022-08-15 PROCEDURE — G8420 CALC BMI NORM PARAMETERS: HCPCS | Performed by: FAMILY MEDICINE

## 2022-08-15 PROCEDURE — 4004F PT TOBACCO SCREEN RCVD TLK: CPT | Performed by: FAMILY MEDICINE

## 2022-08-15 SDOH — ECONOMIC STABILITY: FOOD INSECURITY: WITHIN THE PAST 12 MONTHS, YOU WORRIED THAT YOUR FOOD WOULD RUN OUT BEFORE YOU GOT MONEY TO BUY MORE.: NEVER TRUE

## 2022-08-15 SDOH — ECONOMIC STABILITY: FOOD INSECURITY: WITHIN THE PAST 12 MONTHS, THE FOOD YOU BOUGHT JUST DIDN'T LAST AND YOU DIDN'T HAVE MONEY TO GET MORE.: NEVER TRUE

## 2022-08-15 ASSESSMENT — PATIENT HEALTH QUESTIONNAIRE - PHQ9
SUM OF ALL RESPONSES TO PHQ QUESTIONS 1-9: 0
SUM OF ALL RESPONSES TO PHQ9 QUESTIONS 1 & 2: 0
SUM OF ALL RESPONSES TO PHQ QUESTIONS 1-9: 0
1. LITTLE INTEREST OR PLEASURE IN DOING THINGS: 0
SUM OF ALL RESPONSES TO PHQ QUESTIONS 1-9: 0
2. FEELING DOWN, DEPRESSED OR HOPELESS: 0
SUM OF ALL RESPONSES TO PHQ QUESTIONS 1-9: 0

## 2022-08-15 ASSESSMENT — ENCOUNTER SYMPTOMS
NAUSEA: 0
BACK PAIN: 0
VOMITING: 0
BLOOD IN STOOL: 0
COUGH: 0
CONSTIPATION: 0
ABDOMINAL PAIN: 0
DIARRHEA: 0
SORE THROAT: 0
WHEEZING: 0
SHORTNESS OF BREATH: 0

## 2022-08-15 ASSESSMENT — LIFESTYLE VARIABLES
HOW OFTEN DO YOU HAVE A DRINK CONTAINING ALCOHOL: NEVER
HOW MANY STANDARD DRINKS CONTAINING ALCOHOL DO YOU HAVE ON A TYPICAL DAY: PATIENT DOES NOT DRINK

## 2022-08-15 ASSESSMENT — SOCIAL DETERMINANTS OF HEALTH (SDOH): HOW HARD IS IT FOR YOU TO PAY FOR THE VERY BASICS LIKE FOOD, HOUSING, MEDICAL CARE, AND HEATING?: NOT HARD AT ALL

## 2022-08-15 NOTE — PROGRESS NOTES
Marley Ko is a 40 y.o. female who presents today for     Chief Complaint   Patient presents with    Follow-up     Er follow up, low bp, passed out, syncope, bruised left knee, bp is always low. dr Emil Ridley patient. Happened a work. Syncope:  Seen @ [facility] 8/13/22, where she passed out at work. ER workup was negative. Further history: she is in a full suit work in a battery plant. She reports inadequate opportunity to eat or drink during a 12 hour shift with swing shifts and short breaks while at work. PMH: \"irregular heart beat\" on metoprolol 25 mg/day. Followed yearly by Cardiology. Otherwise PMH unremarkable. SH: Remarkable for Tobacco 3/4 ppd. Reports contemplative phase of quitting but reports that nicotine replacement and other tobacco cessation aides are poorly tolerated. No alcohol or drugs. [address]:  Patient's past medical, surgical, social and/or family history reviewed, updated in chart, and are non-contributory (unless otherwise stated). Medications and allergies also reviewed and updated in chart. Review of Systems  Review of Systems   HENT:  Negative for congestion, ear pain and sore throat. Respiratory:  Negative for cough, shortness of breath and wheezing. Cardiovascular:  Positive for chest pain. Negative for palpitations and leg swelling. Gastrointestinal:  Negative for abdominal pain, blood in stool, constipation, diarrhea, nausea and vomiting. Genitourinary:  Negative for dysuria, frequency, hematuria and urgency. Musculoskeletal:  Negative for back pain, myalgias and neck pain. Skin:  Negative for rash. Neurological:  Positive for syncope. Negative for dizziness, weakness and headaches. Psychiatric/Behavioral:  The patient is not nervous/anxious.       Physical Exam:    VS:  /62   Pulse 78   Temp 98.1 °F (36.7 °C) (Infrared)   Resp 18   Ht 5' 3\" (1.6 m)   Wt 141 lb (64 kg)   SpO2 98%   BMI 24.98 kg/m²     LAST WEIGHT:  Wt Readings dysfunction; however, it is more likely to be inadequate nourishment and hydration while at work. -     TSH; Future  -     The following statement as a work release composed and provided to employer: It is my medical opinion that Leesa Pearl may return to work on 8/18/22 with the following restrictions: additional time must be allotted for Ms. Corbett to gain access to the abililty to get nourishment twice during her shift as well as the ability to hydrate 48-56 oz water while on shift in order to avoid recurrence of 8/13/22 episode. Viki Dove Heat intolerance  -     TSH; Future      Follow Up:  Return in about 5 weeks (around 9/19/2022), or WITH DR. Evelia Mohs (PCP), for Check up. or sooner if necessary. Call or go to ED immediately if symptoms worsen or persist.    Educational materials and/or home exercises printed for patient's review and were included in patient instructions on his/her AfterVisit Summary and given to patient at the end of visit. Counseled regarding above diagnosis,including possible risks and complications,  especially if left uncontrolled. Counseled regarding the possible side effects, risks, benefits and alternatives to treatment; patient and/or guardian verbalizes understanding, agrees, feels comfortable with and wishes to proceed with above treatment plan. Advised patient tocall with any new medication issues, and read all Rx info from pharmacy to assureaware of all possible risks and side effects of medication before taking. Reviewed age and gender appropriate health screening exams and vaccinations. Advisedpatient regarding importance of keeping up with recommended health maintenance andto schedule as soon as possible if overdue, as this is important in assessing forundiagnosed pathology, especially cancer, as well as protecting against potentially harmful/life threatening disease.       Patient and/or guardian verbalizes understandingand agrees with above

## 2022-08-15 NOTE — LETTER
[address] [facility] [place]  Phone: [phone]  Fax: [phone]    Albina Buckley MD        August 15, 2022    Patient: Jamal Argueta   YOB: 1975   Date of Visit: 8/15/22       To Whom It May Concern: It is my medical opinion that Jamal Argueta may return to work on 8/18/22 with the following restrictions: additional time must be allotted for Ms. Corbett to gain access to the abililty to get nourishment twice during her shift as well as the ability to hydrate 48-56 oz water while on shift in order to avoid recurrence of 8/13/22 episode. .    If you have any questions or concerns, please don't hesitate to call.     Sincerely,        Albina Buckley MD

## 2022-12-08 ENCOUNTER — HOSPITAL ENCOUNTER (EMERGENCY)
Age: 47
Discharge: HOME | End: 2022-12-08
Attending: EMERGENCY MEDICINE
Payer: COMMERCIAL

## 2022-12-08 ENCOUNTER — APPOINTMENT (OUTPATIENT)
Dept: GENERAL RADIOLOGY | Age: 47
End: 2022-12-08
Payer: COMMERCIAL

## 2022-12-08 VITALS
OXYGEN SATURATION: 98 % | TEMPERATURE: 98.7 F | DIASTOLIC BLOOD PRESSURE: 78 MMHG | BODY MASS INDEX: 24.13 KG/M2 | WEIGHT: 136.2 LBS | RESPIRATION RATE: 16 BRPM | SYSTOLIC BLOOD PRESSURE: 138 MMHG | HEART RATE: 96 BPM

## 2022-12-08 DIAGNOSIS — S89.92XA INJURY OF LEFT KNEE, INITIAL ENCOUNTER: Primary | ICD-10-CM

## 2022-12-08 DIAGNOSIS — M25.562 ACUTE PAIN OF LEFT KNEE: ICD-10-CM

## 2022-12-08 PROCEDURE — 73564 X-RAY EXAM KNEE 4 OR MORE: CPT

## 2022-12-08 PROCEDURE — 99283 EMERGENCY DEPT VISIT LOW MDM: CPT

## 2022-12-08 RX ORDER — HYDROCODONE BITARTRATE AND ACETAMINOPHEN 5; 325 MG/1; MG/1
1 TABLET ORAL EVERY 6 HOURS PRN
Qty: 12 TABLET | Refills: 0 | Status: SHIPPED | OUTPATIENT
Start: 2022-12-08 | End: 2022-12-11

## 2022-12-08 NOTE — DISCHARGE INSTRUCTIONS
Bear weight on your left knee as tolerated. Use the crutches for ambulation. Take Motrin or Tylenol as needed for pain. Take Norco as needed for more severe pain. Do not drive or work while taking [address] because it can make you drowsy. Do not mix Norco with alcohol.

## 2022-12-08 NOTE — ED PROVIDER NOTES
HPI:  12/8/22, Time: 6:51 AM PHILIPPE Garcia is a 56 y.o. female presenting to the ED for knee pain beginning today after work injury. 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 Symptoms have been moderate in severity and constant, worsened with ambulation, better with over-the-counter NSAIDs. No associated numbness or tingling. She did not fall, strike her head, or sustain any other injuries. She takes no anticoagulation. No fevers. She has been able to ambulate though she states it is painful. She has seen Dr. Gerhard Sheets in the past with orthopedic surgery. Patient states there is no chance she could be pregnant. Last menstrual period was 12 years ago. Review of Systems:   Pertinent positives and negatives are stated within HPI, all other systems reviewed and are negative.          --------------------------------------------- PAST HISTORY ---------------------------------------------  Past Medical History:  has a past medical history of Bulging lumbar disc, Compression of nerve, Headache(784.0), Insomnia, and Tachycardia. Past Surgical History:  has a past surgical history that includes Knee arthroscopy (right); Breast surgery (augmentation); pr reconstruct prox humeral implant (Right, 5/14/2014); Biceps tendon repair (Right); Rotator cuff repair (Right); Breast enhancement surgery (Bilateral); Nerve Block (Right, 10 12 2015); Nerve Block (Right, 10 22 2015); Nerve Block (Right, 11 02 2015); Nerve Block (11/11/15); Nerve Block (11/18/15); Nerve Block (11/25/15); Nerve Block (Right, 1 18 15); Nerve Block (Right, 02/01/16); Nerve Block (Bilateral, 07/25/2016); Nerve Block (Bilateral, 08/17/2016); other surgical history (Right, 09/10/2018); pr office/outpt visit,procedure only (Right, 9/10/2018); HEMORROIDECTOMY (N/A, 5/7/2019); Nerve Block (Bilateral, 5/6/2021); Nerve Block (Right, 6/22/2021);  Medication Injection (Left, 7/26/2021); Nerve Block (N/A, 10/12/2021); and Pain management procedure (Bilateral, 12/7/2021). Social History:  reports that she has been smoking cigarettes. She has a 7.50 pack-year smoking history. She has never used smokeless tobacco. She reports current alcohol use. She reports that she does not use drugs. Family History: family history includes Cancer in her father and sister; Heart Disease in her father; High Blood Pressure in her mother; Migraines in her mother and son. The patients home medications have been reviewed. Allergies: Patient has no known allergies. -------------------------------------------------- RESULTS -------------------------------------------------  All laboratory and radiology results have been personally reviewed by myself   LABS:  No results found for this visit on 12/08/22. RADIOLOGY:  Interpreted by Radiologist.  XR KNEE LEFT (MIN 4 VIEWS)   Final Result   No evidence of acute osseous abnormality is seen. ------------------------- NURSING NOTES AND VITALS REVIEWED ---------------------------   The nursing notes within the ED encounter and vital signs as below have been reviewed. /78   Pulse 96   Temp 98.7 °F (37.1 °C) (Oral)   Resp 16   Wt 136 lb 3.2 oz (61.8 kg)   SpO2 98%   BMI 24.13 kg/m²   Oxygen Saturation Interpretation: Normal      ---------------------------------------------------PHYSICAL EXAM--------------------------------------      Constitutional/General: Alert and oriented x3, well appearing, non toxic in NAD  Head: Normocephalic and atraumatic  Eyes: EOMI  Mouth: Oropharynx clear, handling secretions, no trismus  Neck: Supple, full ROM,   Pulmonary:Not in respiratory distress,   Extremities: Moves all extremities x 4. Warm and well perfused.  LLE-tenderness to medial knee, no deformity, no laxity, skin intact, no warmth or erythema to joint, no tenderness to distal tibia/fibula or femur, soft and easily compressible compartments, normal sensation, normal range of motion  Skin: warm and dry without rash  Neurologic: GCS 15, no focal motor or sensory deficits   Psych: Normal Affect. Behavior normal.      ------------------------------ ED COURSE/MEDICAL DECISION MAKING----------------------  Medications - No data to display    Medical Decision Making/ED COURSE:   Patient is a 78-year-old female presenting after knee injury at work. In the ED, she was hemodynamically stable and afebrile. She had no evidence of septic joint or obvious deformity on examination. Offered pain medication but patient declined stating that she just took something prior to arrival.  X-rays of left knee obtained. I reviewed and interpreted x-rays. I did not see any acute fractures. Radiology confirmed this read. Concern for possible ligamentous injury. Patient will require MRI to further evaluate her symptoms. She has seen Dr. Betsy Meyers in the past with orthopedic surgery, and she was advised to follow-up as scheduled. She will be provided with a knee immobilizer placed by nursing and crutches for ambulation. She was also written a prescription for Norco for pain control. She will also be given Worker's Comp. referral.  Supportive care measures and ED return precautions discussed. Patient remained hemodynamically stable throughout ED course. New Prescriptions    HYDROCODONE-ACETAMINOPHEN (NORCO) 5-325 MG PER TABLET    Take 1 tablet by mouth every 6 hours as needed for Pain for up to 3 days. Intended supply: 3 days. Take lowest dose possible to manage pain     Esperanza Costa MD      Counseling: The emergency provider has spoken with the patient and discussed todays results, in addition to providing specific details for the plan of care and counseling regarding the diagnosis and prognosis.   Questions are answered at this time and they are agreeable with the plan.      --------------------------------- IMPRESSION AND DISPOSITION ---------------------------------    IMPRESSION  1. Injury of left knee, initial encounter    2. Acute pain of left knee        DISPOSITION  Disposition: Discharge to home  Patient condition is stable      NOTE: This report was transcribed using voice recognition software.  Every effort was made to ensure accuracy; however, inadvertent computerized transcription errors may be present    INikos MD, am the primary provider of this record        Nikos Tejeda MD  12/08/22 [id number]

## 2022-12-20 ENCOUNTER — EVALUATION (OUTPATIENT)
Dept: PHYSICAL THERAPY | Age: 47
End: 2022-12-20

## 2022-12-20 DIAGNOSIS — S83.92XA SPRAIN OF LEFT KNEE, UNSPECIFIED LIGAMENT, INITIAL ENCOUNTER: Primary | ICD-10-CM

## 2022-12-20 NOTE — PROGRESS NOTES
[facility]          Phone: [phone] Fax: [phone]    Physical Therapy Daily Treatment Note  Date:  2022    Patient Name:  Keith Zamudio    :  1975  MRN: [id number]    Evaluating therapist: Julita Mendez, PT, DPT  [id number]    Restrictions/Precautions:      Diagnosis:     Diagnosis Orders   1. Sprain of left knee, unspecified ligament, initial encounter          Treatment Diagnosis:    Insurance/Certification information:  [facility]  Referring Physician:  Kamron Tucker DO  Plan of care signed (Y/N):    Visit# / total visits:   (C-9 3x/week for 4 weeks, exp 23)  Pain level: 6/10   Time In:  0800  Time Out:  0840    Subjective:  See initial eval    Exercises:  Exercise/Equipment Resistance/Repetitions Other comments            Quad sets 10x 3 sec hold      Heel slides 10x 3 sec hold With sheet     LAQ 10x Within available range flexion                                                                                                                 Other Therapeutic Activities:  Pt tolerated introduction of TE's with significantly limited L knee flexion noted d/t pain with all TE's. Reviewed stair mechanics with pt for safety at home with good carryover/understanding.      Home Exercise Program:  Heel slides, LAQ, Quad sets    Manual Treatments:  N/A    Modalities:  N/A-- will trial ultrasound next session to medial knee; held this date d/t sensitivity to light touch     Time-in Time-out Total Time   38291  Evaluation Low Complexity 0800 0820 20   02634  Evaluation Med Complexity      56302  Evaluation High Complexity      20213  Ther Ex 0820 0840 20   86254  Neuro Re-ed        52900  Ther Activities        55786  Manual Therapy       77564  E-stim       61594  Ultrasound            Session 08 0840 40       Treatment/Activity Tolerance:  [] Patient tolerated treatment well [] Patient limited by fatigue  [x] Patient limited by pain  [] Patient limited by other medical complications  [] Other:     Prognosis: [x] Good [] Fair  [] Poor    Patient Requires Follow-up: [] Yes  [] No    Plan:   [x] Continue per plan of care [] Alter current plan (see comments)  [] Plan of care initiated [] Hold pending MD visit [] Discharge    Plan for Next Session:  progress L knee ROM, strength, and progress gait away from crutches as tolerated      Electronically signed by:  Oliverio Wilson, PT, DPT  [id number]

## 2022-12-20 NOTE — PROGRESS NOTES
[facility]   Phone: [phone]   Fax: [phone]         Date:  2022   Patient: Leodan Braden  : 1975  MRN: [id number]  Referring Provider: Travis Huston DO  [address] [place] [address]. [place],  [address] [facility]     Medical Diagnosis:      Diagnosis Orders   1. Sprain of left knee, unspecified ligament, initial encounter             SUBJECTIVE:     Onset date: 2022    Onset: Sudden onset    Mechanism of Injury: Pt was moving 1500 lb raw foil on front lift, pulled back on cart and went to steer when feet slipped and knee twisted after audible pop. Pain with all flexion. Pt pending MRI of L knee. Previous PT: none     Medical Management for Current Problem:  knee immobilizer to wear at work, no pain meds d/t work status. Chief complaint: pain, decreased motion, decreased mobility, difficulty walking, difficulty with stairs, limited tolerance to weightbearing tasks and weightbearing duration    Behavior: condition is staying the same    Pain: constant  Current: 6/10     Best: 6/10     Worst:8/10    Symptom Type/Quality: sharp, twisting, pulling, throbbing  Location[de-identified] Knee: medial, anterior, popliteral     Aggravated by: walking, standing, getting in/out of car, flexion    Relieved by: ice, heat, sitting in a warm bath before work    Imaging results: XR KNEE LEFT (MIN 4 VIEWS)    Result Date: 2022  EXAMINATION: FOUR XRAY VIEWS OF THE LEFT KNEE 2022 6:25 am COMPARISON: 2019 HISTORY: ORDERING SYSTEM PROVIDED HISTORY: felt pop in knee, knee pain TECHNOLOGIST PROVIDED HISTORY: Reason for exam:->felt pop in knee, knee pain FINDINGS: No significant narrowing of the medial, lateral and patellofemoral joint spaces is seen. Unremarkable alignment with no evidence of dislocation. No significant degenerative changes visualized. No evidence of cortical irregularity or lucency to suggest a fracture, no evidence of lytic or sclerotic bone lesion is seen.  No evidence of suprapatellar effusion. The quadriceps and patellar tendons are unremarkable. No evidence of acute osseous abnormality is seen.        Past Medical History:  Past Medical History:   Diagnosis Date    Bulging lumbar disc     Compression of nerve     Headache(784.0)     Insomnia     Tachycardia     takes metoprolol for irreg HR      Past Surgical History:   Procedure Laterality Date    BICEPS TENDON REPAIR Right     BREAST ENHANCEMENT SURGERY Bilateral     BREAST SURGERY  augmentation    HEMORROIDECTOMY N/A 5/7/2019    EXAM UNDER ANESTHESIA HEMORRHOIDECTOMY performed by Nerissa Huang MD at [address] [employer] ARTHROSCOPY  right    MEDICATION INJECTION Left 7/26/2021    LEFT SACROILIAC JOINT INJECTION UNDER FLUOROSCOPY performed by Quoc Marvin MD at [place] [address] Right 10 12 2015    lumbar right transforaminal nerve block #1 l4-5 l5-s1    NERVE BLOCK Right 10 22 2015    Transforaminal lumbar nerve block #2    NERVE BLOCK Right 11 02 2015    transforaminal nerve block right lumbar #3    NERVE BLOCK  11/11/15    sacroiliac joint right #1    NERVE BLOCK  11/18/15    sacroiliac joint right #2    NERVE BLOCK  11/25/15    sacroiliac joint right #3    NERVE BLOCK Right 1 18 15    hip inj #1    NERVE BLOCK Right 02/01/16    hip injection #2    NERVE BLOCK Bilateral 07/25/2016    Bilateral paravertebral facet lumbar #1    NERVE BLOCK Bilateral 08/17/2016    lumbar paravertebral facet #2    NERVE BLOCK Bilateral 5/6/2021    # 1 BILATERAL LUMBAR MEDIAL BRANCH NERVE BLOCK UNDER FLUOROSCOPIC GUIDANCE AT L2, L3, L4 AND L5 DORSAL RAMI performed by Quoc Marvin MD at [place] [address] Right 6/22/2021    LUMBAR TRANSFORAMINAL EPIDURAL STEROID INJECTION RIGHT L5 AND S1 UNDER FLUOROSCOPIC GUIDANCE (CPT 30123) performed by Quoc Marvin MD at [facility].  10/12/2021    [address] [place] [address] L2,L3,L4 AND ,L5 DORSAL RAMI WITH XRAY  (CPT K4928150) performed by Cindy Fischer MD at [address] Right 09/10/2018    laser upper eyelid    PAIN MANAGEMENT PROCEDURE Bilateral 12/7/2021    BILATERAL LUMBAR RADIOFREQUENCY ABLATION UNDER FLUOROSCOPIC GUIDANCE AT L2, L3, L4 AND L5 WITH SEDATION (CPT 42139) performed by Cindy Fischer MD at [place] [address] OFFICE/OUTPT [address] Right 9/10/2018    ER YAG LASER ABLATION RIGHT UPPER EYELID SYMPTOMATIC performed by Manjula Calhoun MD at [place] [address] RECONSTRUCT [place] Right 5/14/2014    Arthroplasty, Shoulder    ROTATOR CUFF REPAIR Right        Medications:   Current Outpatient Medications   Medication Sig Dispense Refill    medroxyPROGESTERone (DEPO-PROVERA) 150 MG/ML injection INJECT INTO MUSCLE EVERY 12 WEEKS      ibuprofen (ADVIL;MOTRIN) 200 MG tablet Take 200 mg by mouth every 6 hours as needed for Pain Using 3-4 tabs every 6 hours. nabumetone (RELAFEN) 750 MG tablet Take 1 tablet by mouth 2 times daily as needed for Pain (Patient not taking: Reported on 4/8/2022) 60 tablet 1    metoprolol succinate (TOPROL XL) 25 MG extended release tablet TAKE 1 TABLET BY MOUTH NIGHTLY 90 tablet 3     No current facility-administered medications for this visit. Occupation:   at [employer] . Physical demands include: lifting, carrying, pushing, pulling heavy weighted items (50 - 100 lbs Occasionally), assorted work positions (kneeling, crouching, crawling, stooping), walking, standing, tool use, pushing/pulling cart . Status: full time, light / modified / transitional duty.     Exercise regimen: none    Hobbies: none, caregiver for both parents    Patient Goals: pain relief, full use of leg, get back to normal, return to work, get rid of assistive device    Precautions/Contraindications: none    OBJECTIVE:     Observations: well nourished female    Inspection:  stiffness of L LE, maintained in extension throughout sitting and mobility. Edema: none     Gait: antalgic gait, ambulates with crutches, step to leading with L LE    Joint/Motion:    Knee:  Right:   AROM: 130° Flexion,  0° Extension    Left:   AROM: 52° Flexion,  0° Extension (feels \"stuck\" and painful with flexion)    Strength:    Knee:   Right: Hip: 4+/5, Knee: Flexion 4+/5,  Extension 4+/5  Left: Hip: 3+/5, Knee: Flexion 3/5,  Extension 2+/5    Palpation: Tender to palpation at medial knee with all light touch. Special Tests/Functional Screens:    [] Lachman's []+ / [] -    [] Anterior Drawer []+ / [] -   [x] Valgus Stress []+ / [x] -  [] Thessaly Test []+ / [] -   [] Krystal's Sign []+ / [] -   [] Apley compression: []+ / [] - [] Bounce Home []+ / [] -   [] Andre []+ / [] -   [] Pivot Shift []+ / [] -   [] Posterior Drawer []+ / [] -   [] Varus Stress []+ / [] -   [] Patellar Tracking: []+ / [] -     Special test comments: + medial joint line tenderness, - valgus L knee      ASSESSMENT     Outcome Measure:   Lower Extremity Functional Scale (LEFS) 18/80    Problems:   Pain reported 6-8/10  ROM decreased in L knee flexion  Strength decreased throughout L LE  Decreased functional ability with walking, stairs, standing, sitting, work, ADLs , use of left lower extremity, limited tolerance to weightbearing tasks and weightbearing duration    Reason for Skilled Care: Pt presents to therapy s/p fall with popping of L knee and subsequent medial knee pain     [x] There are no barriers affecting plan of care or recovery    [] Barriers to this patient's plan of care or recovery include.     Domestic Concerns:  [x] No  [] Yes:    Long Term goals (4-6 weeks)  Decrease reported pain to 0-2/10  Increase ROM to 120* L knee flexion  Increase Strength to 4/5  L LE   Able to perform/complete the following functions/tasks: amb >500 feet with no AD with 0-2/10 pain, ascend/descend 10 stairs reciprocally with 0-2/10 pain  LEFS 62/80  Independent with Home Exercise Programs    Rehab Potential: [x] Good  [] Fair  [] Poor    PLAN       Treatment Plan:   [x] Therapeutic Exercise  [x] Therapeutic Activity  [x] Neuromuscular Re-education   [] Gait Training  [] Balance Training  [] Aerobic conditioning  [] Manual Therapy  [] Massage/Fascial release   [] Work/Sport specific activities    [] Pain Neuroscience [] Cold/hotpack  [] Vasocompression  [] Electrical Stimulation  [] Lumbar/Cervical Traction  [] Ultrasound   [] Iontophoresis: 4 mg/mL Dexamethasone Sodium Phosphate 40-80 mAmin  [] Dry Needling      [x] Instruction in HEP      []  Medication allergies reviewed for use of Dexamethasone Sodium Phosphate 4mg/ml  with iontophoresis treatments. Patient is not allergic. The following CPT codes are likely to be used in the care of this patient: 96283 PT Evaluation: Low Complexity, 60992 PT Re-Evaluation, 68050 Therapeutic Exercise, 51787 Neuromuscular Re-Education, 01910 Therapeutic Activities, 27205 Manual Therapy, 63439 Gait Training, 16947 Electric Stimulation, and 55220 Ultrasound      Suggested Professional Referral: [x] No  [] Yes:     Patient Education:  [x] Plans/Goals, Risks/Benefits discussed  [x] Home exercise program  Method of Education: [x] Verbal  [x] Demo  [x] Written  Comprehension of Education:  [x] Verbalizes understanding. [x] Demonstrates understanding. [] Needs Review. [] Demonstrates/verbalizes understanding of HEP/Ed previously given. Frequency: 1-2 days per week for 4-6 weeks    Patient understands diagnosis/prognosis and consents to treatment, plan and goals: [x] Yes    [] No     Thank you for the opportunity to work with your patient. If you have questions or comments, please contact me at numbers listed above. Electronically signed by: Macy Jaimes, PT, DPT  [id number]    Medicare Patients Only     Please sign Physician's Certification and return to:   [facility]  [address] [id number]  Dept: [phone]  Dept Fax: [phone]  Loc: [phone] Certification / Comments     Frequency/Duration 1-2 days per week for 4-6 weeks. Certification period from 12/20/2022  to 3/31/2023. I have reviewed the Plan of Care established for skilled therapy services and certify that the services are required and that they will be provided while the patient is under my care.     Physician's Comments/Revisions:               Physician's Printed Name:                                           [de-identified] Signature:                                                               Date:

## 2022-12-28 ENCOUNTER — TREATMENT (OUTPATIENT)
Dept: PHYSICAL THERAPY | Age: 47
End: 2022-12-28

## 2022-12-28 DIAGNOSIS — S83.92XA SPRAIN OF LEFT KNEE, UNSPECIFIED LIGAMENT, INITIAL ENCOUNTER: Primary | ICD-10-CM

## 2022-12-28 NOTE — PROGRESS NOTES
able )     Manual Treatments:  N/A    Modalities:  N/A-- will trial ultrasound next session to medial knee; held this date d/t sensitivity to light touch     Time-in Time-out Total Time   02128  Evaluation Low Complexity      15237  Evaluation Med Complexity      28121  Evaluation High Complexity      20044  Ther Ex 716 139 38   16658  Neuro Re-ed        94533  Ther Activities        42084  Manual Therapy       23692  E-stim       41178  Ultrasound            Session 174 379 50       Treatment/Activity Tolerance:  [] Patient tolerated treatment well [] Patient limited by fatigue  [x] Patient limited by pain  [] Patient limited by other medical complications  [] Other:     Prognosis: [x] Good [] Fair  [] Poor    Patient Requires Follow-up: [] Yes  [] No    Plan:   [x] Continue per plan of care [] Alter current plan (see comments)  [] Plan of care initiated [] Hold pending MD visit [] Discharge    Plan for Next Session:  progress L knee ROM, strength, and progress gait away from crutches as tolerated      Electronically signed by: Jazz Freeman PTA [id number]

## 2022-12-29 ENCOUNTER — TREATMENT (OUTPATIENT)
Dept: PHYSICAL THERAPY | Age: 47
End: 2022-12-29

## 2022-12-29 DIAGNOSIS — S83.92XA SPRAIN OF LEFT KNEE, UNSPECIFIED LIGAMENT, INITIAL ENCOUNTER: Primary | ICD-10-CM

## 2022-12-29 NOTE — PROGRESS NOTES
[facility]          Phone: [phone] Fax: [phone]    Physical Therapy Daily Treatment Note  Date:  2022    Patient Name:  Chris Currie    :  1975  MRN: [id number]    Evaluating therapist: Oliverio Wilson PT, DPT  [id number]    Restrictions/Precautions:      Diagnosis:     Diagnosis Orders   1. Sprain of left knee, unspecified ligament, initial encounter            Treatment Diagnosis:    Insurance/Certification information:  [facility]  Referring Physician:  Isac Norris DO  Plan of care signed (Y/N):    Visit# / total visits:  3/12 (C-9 3x/week for 4 weeks, exp 23)  Pain level: 7/10   Time In:  0800  Time Out:  0840    Subjective:  Pt using ax crutches, gait is antalgic, and slow. Pt wearing L knee immobilizer. Pt reported she has an MRI of L knee scheduled for 23. Pt notes ongoing soreness and stiffness limiting continued ROM. Exercises:  Exercise/Equipment Resistance/Repetitions Other comments     Bike  X 5 mins  rocking back/forth Seat 8     Quad sets 10x 3 sec hold      Heel slides With sheet     LAQ 10x Within available range flexion     SLR  2 x 5 reps AROM      VMO SLR  X 5 reps AAROM from therapist      SL abd  X 10 reps AROM       Seated heel slides w/ towel X 2 mins   HEP                                                                                     Other Therapeutic Activities:  Knee immobilizer removed for ex. Pt tolerated TE's with persistent limitation of L knee flexion noted d/t pain with all TE's. No clicking or increased pain aside from stretch sensation noted with seated heel slides; supine held d/t response last session. Tolerated ice with IFC without significant decrease in pain.  Pain remained /10 after session      Home Exercise Program:   [place] [employer] sets  seated heel slides, ( home or work as able )     Manual Treatments:  N/A    Modalities: IFC to L knee x15 min with ice pack and L LE elevated     Time-in Time-out Total Time   24958  Evaluation Low Complexity      16577  Evaluation Med Complexity      94010  Evaluation High Complexity      42482  Ther Ex 0800 0825 25   21616  Neuro Re-ed        49625  Ther Activities        26126  Manual Therapy       43357  E-stim  0825 0840 15   70400  Ultrasound            Session 0800 0840 40       Treatment/Activity Tolerance:  [] Patient tolerated treatment well [] Patient limited by fatigue  [x] Patient limited by pain  [] Patient limited by other medical complications  [] Other:     Prognosis: [x] Good [] Fair  [] Poor    Patient Requires Follow-up: [x] Yes  [] No    Plan:   [x] Continue per plan of care [] Alter current plan (see comments)  [] Plan of care initiated [] Hold pending MD visit [] Discharge    Plan for Next Session:  progress L knee ROM, strength, and progress gait away from crutches as tolerated      Electronically signed by: Mell Morrow, PT, DPT  [id number]

## 2022-12-30 ENCOUNTER — TREATMENT (OUTPATIENT)
Dept: PHYSICAL THERAPY | Age: 47
End: 2022-12-30

## 2022-12-30 DIAGNOSIS — S83.92XA SPRAIN OF LEFT KNEE, UNSPECIFIED LIGAMENT, INITIAL ENCOUNTER: Primary | ICD-10-CM

## 2022-12-30 NOTE — PROGRESS NOTES
[facility]          Phone: [phone] Fax: [phone]    Physical Therapy Daily Treatment Note  Date:  2022    Patient Name:  Alex Long    :  1975  MRN: [id number]    Evaluating therapist: Pam Sanders PT, DPT  [id number]    Restrictions/Precautions:      Diagnosis:     Diagnosis Orders   1. Sprain of left knee, unspecified ligament, initial encounter            Treatment Diagnosis:    Insurance/Certification information:  [facility]  Referring Physician:  Wilmer Burns DO  Plan of care signed (Y/N):    Visit# / total visits:   (C-9 3x/week for 4 weeks, exp 23)  Pain level: 7/10   Time In:  1500  Time Out:  1540    Subjective:  Pt presents using single axillary crutch, mildly antalgic gait upon arrival. She notes increased discomfort at lateral knee while attempting to grocery shop since last session. Exercises:  Exercise/Equipment Resistance/Repetitions Other comments     Bike  X 10 mins  rocking back/forth Seat 8     Quad sets 10x 3 sec hold      Heel slides With sheet     LAQ Within available range flexion     SLR  2 x 5 reps AROM      VMO SLR  X 5 reps AAROM from therapist      SL abd  X 10 reps AROM       Seated heel slides w/ towel 10x mins   HEP     SAQ 10x                                                                               Other Therapeutic Activities:  Pt educated in improved gait mechanics with single crutch placed in R UE with improvement in gait pattern. Pt tolerated TE's without clicking or popping sensation noted with of the above TE's. Continues to be limited in ROM d/t pain. Pt reports increased comfort with moist heat vs ice with IFC treatment.     Home Exercise Program:   Kiesha Pert sets  seated heel slides, ( home or work as able )     Manual Treatments:  N/A    Modalities: IFC to L knee x10 min with hot pack and L LE elevated, ultrasound to lateral knee x8 min, 3.3 MHz, 100% intensity     Time-in Time-out Total Time   05467  Evaluation Low Complexity      63577  Evaluation Med Complexity      41573  Evaluation High Complexity      75783  Ther Ex 1500 1522 22   06007  Neuro Re-ed        20600  Ther Activities        29142  Manual Therapy       56030  E-stim  1530 1540 10   87464  Ultrasound 1522 1530 8         Session 1500 1540 40       Treatment/Activity Tolerance:  [] Patient tolerated treatment well [] Patient limited by fatigue  [x] Patient limited by pain  [] Patient limited by other medical complications  [] Other:     Prognosis: [x] Good [] Fair  [] Poor    Patient Requires Follow-up: [x] Yes  [] No    Plan:   [x] Continue per plan of care [] Alter current plan (see comments)  [] Plan of care initiated [] Hold pending MD visit [] Discharge    Plan for Next Session:  progress L knee ROM, strength, and progress gait away from crutches as tolerated      Electronically signed by: Jesenia Amor, PT, DPT  [id number]

## 2023-01-02 ENCOUNTER — HOSPITAL ENCOUNTER (OUTPATIENT)
Dept: MRI IMAGING | Age: 48
Discharge: HOME | End: 2023-01-04
Payer: COMMERCIAL

## 2023-01-02 DIAGNOSIS — S83.92XA SPRAIN OF LEFT KNEE, UNSPECIFIED LIGAMENT, INITIAL ENCOUNTER: ICD-10-CM

## 2023-01-02 PROCEDURE — 73721 MRI JNT OF LWR EXTRE W/O DYE: CPT

## 2023-01-03 ENCOUNTER — TREATMENT (OUTPATIENT)
Dept: PHYSICAL THERAPY | Age: 48
End: 2023-01-03
Payer: COMMERCIAL

## 2023-01-03 DIAGNOSIS — S83.92XA SPRAIN OF LEFT KNEE, UNSPECIFIED LIGAMENT, INITIAL ENCOUNTER: Primary | ICD-10-CM

## 2023-01-03 PROCEDURE — 97014 ELECTRIC STIMULATION THERAPY: CPT

## 2023-01-03 PROCEDURE — 97110 THERAPEUTIC EXERCISES: CPT

## 2023-01-03 NOTE — PROGRESS NOTES
[facility]          Phone: [phone] Fax: [phone]    Physical Therapy Daily Treatment Note  Date:  1/3/2023    Patient Name:  Cb Corbett    :  1975  MRN: [id number]    Evaluating therapist: Nidhi Reese, PT, DPT  [id number]    Restrictions/Precautions:      Diagnosis:     Diagnosis Orders   1. Sprain of left knee, unspecified ligament, initial encounter            Treatment Diagnosis:    Insurance/Certification information:  [facility]  Referring Physician:  Denilson Trevizo DO  Plan of care signed (Y/N):    Visit# / total visits:   (C-9 3x/week for 4 weeks, exp 23)  Pain level: 810   Time In:  720  Time Out:  800    Subjective:  Pt presents using single axillary crutch, mildly antalgic gait upon arrival. She notes increased discomfort at proximal patella with clicking and shifting of \" something \" reported when she is extending her L knee. Pt continues to report lateral left knee pain with flexion. Pt had her MRI yesterday L knee , awaiting results.     Exercises:  Exercise/Equipment Resistance/Repetitions Other comments     Bike  X 10 mins  rocking back/forth Seat 8     Quad sets 10x 3 sec hold      Heel slides With sheet     LAQ Within available range flexion     SLR  2 x 5 reps AROM      VMO SLR  X 5 reps AAROM from therapist      SL abd  X 10 reps AROM       Seated heel slides w/ towel 10x mins   HEP     SAQ 10x                                                                               Other Therapeutic Activities:  Pt tolerated TE's but clicking or popping sensation noted with of the above TE's. Continues to be limited in ROM d/t pain. Pt reports increased comfort with moist heat vs ice with IFC treatment.    Home Exercise Program:   LAQ, Quad sets  seated heel slides, ( home or work as able )     Manual Treatments:  N/A    Modalities: IFC to L knee x10 min with hot pack and L LE elevated,    Time-in Time-out Total Time   71932   Evaluation Low Complexity      48086  Evaluation Med Complexity      06228  Evaluation High Complexity      19882  Ther Ex 720 750 30   26948  Neuro Re-ed        57000  Ther Activities        68968  Manual Therapy       45157  E-stim  750 800 10   15942  Ultrasound            Session 720 800 40       Treatment/Activity Tolerance:  [] Patient tolerated treatment well [] Patient limited by fatigue  [x] Patient limited by pain  [] Patient limited by other medical complications  [] Other:     Prognosis: [x] Good [] Fair  [] Poor    Patient Requires Follow-up: [x] Yes  [] No    Plan:   [x] Continue per plan of care [] Alter current plan (see comments)  [] Plan of care initiated [] Hold pending MD visit [] Discharge    Plan for Next Session:  progress L knee ROM, strength, and progress gait away from crutches as tolerated      Electronically signed by: Shirley Nicolas PTA [id number]

## 2023-01-05 ENCOUNTER — TREATMENT (OUTPATIENT)
Dept: PHYSICAL THERAPY | Age: 48
End: 2023-01-05
Payer: COMMERCIAL

## 2023-01-05 DIAGNOSIS — S83.92XA SPRAIN OF LEFT KNEE, UNSPECIFIED LIGAMENT, INITIAL ENCOUNTER: Primary | ICD-10-CM

## 2023-01-05 PROCEDURE — 97035 APP MDLTY 1+ULTRASOUND EA 15: CPT

## 2023-01-05 PROCEDURE — 97014 ELECTRIC STIMULATION THERAPY: CPT

## 2023-01-05 PROCEDURE — 97110 THERAPEUTIC EXERCISES: CPT

## 2023-01-05 NOTE — PROGRESS NOTES
[facility]          Phone: [phone] Fax: [phone]    Physical Therapy Daily Treatment Note  Date:  2023    Patient Name:  Hema Ralph    :  1975  MRN: [id number]    Evaluating therapist: Carmen Parks PT, DPT  [id number]    Restrictions/Precautions:      Diagnosis:     Diagnosis Orders   1. Sprain of left knee, unspecified ligament, initial encounter            Treatment Diagnosis:    Insurance/Certification information:  [facility]  Referring Physician:  Jose Aj DO  Plan of care signed (Y/N):    Visit# / total visits:   (C-9 3x/week for 4 weeks, exp 23)  Pain level: 7/10   Time In:  720  Time Out:  817    Subjective:  Pt presents using single axillary crutch, mildly antalgic gait upon arrival. No new reports. No improvement in pt c/o. Pt has appointment with doctor 23 to review MRI    Exercises:  Exercise/Equipment Resistance/Repetitions Other comments     Bike  X 10 mins  rocking back/forth Seat 8     Quad sets 10x 3 sec hold      Heel slides With sheet     LAQ Within available range flexion     SLR  2 x 5 reps AROM      VMO SLR  X 5 reps AAROM from therapist      SL abd  X 10 reps AROM       Seated heel slides w/ towel 10x mins   HEP     SAQ 10x                                                                               Other Therapeutic Activities:  Pt tolerated TE's but clicking or popping sensation noted with of the above TE's. Continues to be limited in ROM d/t pain. Pt reports increased comfort with moist heat vs ice with IFC treatment.     Home Exercise Program:   Ronny Hymanks sets  seated heel slides, ( home or work as able )     Manual Treatments:  N/A    Modalities: IFC to L knee x10 min with hot pack and L LE elevated, ultrasound to lateral knee x8 min, 3.3 MHz, 100% intensity     Time-in Time-out Total Time   69808  Evaluation Low Complexity      76736  Evaluation Med Complexity      57114  Evaluation High Complexity      76609  Ther Ex 720 759 39   42926  Neuro Re-ed        66046  Ther Activities        52647  Manual Therapy       46508  E-stim  973 809 10   31889  Ultrasound 809 817 8         Session 720 817 57       Treatment/Activity Tolerance:  [] Patient tolerated treatment well [] Patient limited by fatigue  [x] Patient limited by pain  [] Patient limited by other medical complications  [] Other:     Prognosis: [x] Good [] Fair  [] Poor    Patient Requires Follow-up: [x] Yes  [] No    Plan:   [x] Continue per plan of care [] Alter current plan (see comments)  [] Plan of care initiated [] Hold pending MD visit [] Discharge    Plan for Next Session:  progress L knee ROM, strength, and progress gait away from crutches as tolerated      Electronically signed by: Castillo Cruz PTA [id number]

## 2023-01-06 ENCOUNTER — TREATMENT (OUTPATIENT)
Dept: PHYSICAL THERAPY | Age: 48
End: 2023-01-06

## 2023-01-06 DIAGNOSIS — S83.92XA SPRAIN OF LEFT KNEE, UNSPECIFIED LIGAMENT, INITIAL ENCOUNTER: Primary | ICD-10-CM

## 2023-01-06 NOTE — PROGRESS NOTES
[facility]          Phone: [phone] Fax: [phone]    Physical Therapy Daily Treatment Note  Date:  2023    Patient Name:  Thalia Goldsmith    :  1975  MRN: [id number]    Evaluating therapist: Steven Senior PT, DPT  [id number]    Restrictions/Precautions:      Diagnosis:     Diagnosis Orders   1. Sprain of left knee, unspecified ligament, initial encounter            Treatment Diagnosis:    Insurance/Certification information:  [facility]  Referring Physician:  Brandon Marie DO  Plan of care signed (Y/N):    Visit# / total visits:   (C-9 3x/week for 4 weeks, exp 23)  Pain level: 7/10   Time In:  1258  Time Out:  1337    Subjective:  Pt presents using B axillary crutchs, and knee immobilizer donned. No improvement in pt c/o. Pt has appointment with doctor later this date (23) to review MRI. Pt reports increased pain during work shifts d/t constraints of work station and immobility following prolonged amb (approx 16 min amb time) to reach work station. Exercises:  Exercise/Equipment Resistance/Repetitions Other comments     Bike  X 10 mins  rocking back/forth Seat 8     Quad sets 10x 3 sec hold      Heel slides With sheet     LAQ Within available range flexion     SLR  X10 reps AROM      VMO SLR  X 10 reps AROM from therapist      SL abd  X 10 reps AROM       Seated heel slides w/ towel  HEP -- held d/t increased clicking      SAQ 39P                                                                               Other Therapeutic Activities:  Pt tolerated TE's but clicking or popping sensation noted SAQ and attempt for single heel slide. Pt notes no difference in symptoms with IFC but had decreased edema with ultrasound therefore IFC held this date. Further TE's limited by clicking and discomfort.     Home Exercise Program:   Oley Page sets  seated heel slides, ( home or work as able )     Manual Treatments:  N/A    Modalities: ultrasound to lateral knee x8 min, 3.3 MHz, 100% intensity     Time-in Time-out Total Time   92493  Evaluation Low Complexity      42145  Evaluation Med Complexity      61028  Evaluation High Complexity      47628  Ther Ex 1258 1320 22   23225  Neuro Re-ed        23944  Ther Activities        00169  Manual Therapy       98767  E-stim       67615  Ultrasound 1320 1330 10         Session 1258 1330 32       Treatment/Activity Tolerance:  [] Patient tolerated treatment well [] Patient limited by fatigue  [x] Patient limited by pain  [] Patient limited by other medical complications  [] Other:     Prognosis: [x] Good [] Fair  [] Poor    Patient Requires Follow-up: [x] Yes  [] No    Plan:   [x] Continue per plan of care [] Alter current plan (see comments)  [] Plan of care initiated [] Hold pending MD visit [] Discharge    Plan for Next Session:  progress L knee ROM, strength, and progress gait away from crutches as tolerated      Electronically signed by: Naeem Cornell, PT, DPT  [id number]

## 2023-01-09 ENCOUNTER — TREATMENT (OUTPATIENT)
Dept: PHYSICAL THERAPY | Age: 48
End: 2023-01-09
Payer: COMMERCIAL

## 2023-01-09 DIAGNOSIS — S83.92XA SPRAIN OF LEFT KNEE, UNSPECIFIED LIGAMENT, INITIAL ENCOUNTER: Primary | ICD-10-CM

## 2023-01-09 PROCEDURE — 97110 THERAPEUTIC EXERCISES: CPT

## 2023-01-09 PROCEDURE — 97035 APP MDLTY 1+ULTRASOUND EA 15: CPT

## 2023-01-09 NOTE — PROGRESS NOTES
[facility]          Phone: [phone] Fax: [phone]    Physical Therapy Daily Treatment Note  Date:  2023    Patient Name:  Lindalee Severs    :  1975  MRN: [id number]    Evaluating therapist: Hector Trujillo, PT, DPT  [id number]    Restrictions/Precautions:      Diagnosis:     Diagnosis Orders   1. Sprain of left knee, unspecified ligament, initial encounter            Treatment Diagnosis:    Insurance/Certification information:  [facility]  Referring Physician:  Deyanira Dowd DO  Plan of care signed (Y/N):    Visit# / total visits:   (C-9 3x/week for 4 weeks, exp 23)  Pain level: 7/10   Time In:  840  Time     912    Subjective:  Pt presents using B axillary crutchs, and knee immobilizer donned. No improvement in pt c/o. Pt reports increased pain during work shifts d/t constraints of work station and immobility following prolonged amb (approx 16 min amb time) to reach work station. Exercises:  Exercise/Equipment Resistance/Repetitions Other comments     Bike  X 10 mins  rocking back/forth Seat 8     Quad sets 10x 3 sec hold      Heel slides With sheet     LAQ Within available range flexion     SLR  X10 reps AROM      VMO SLR  X 10 reps AROM from therapist      SL abd  X 10 reps AROM       Seated heel slides w/ towel  HEP -- held d/t increased clicking      SAQ 86F                                                                               Other Therapeutic Activities:  Pt tolerated TE's but clicking or popping sensation noted SAQ and attempt for single heel slide. Further TE's limited by clicking and discomfort.     Home Exercise Program:   Dae Gomez sets  seated heel slides, ( home or work as able )     Manual Treatments:  N/A    Modalities: ultrasound to lateral knee x8 min, 3.3 MHz, 100% intensity     Time-in Time-out Total Time   63773  Evaluation Low Complexity      25694  Evaluation Med Complexity      27877  Evaluation High Complexity      08109  Ther Ex 840 902 22   41417  Neuro Re-ed        54723  Ther Activities        03411  Manual Therapy       28258  E-stim       12297  Ultrasound 621 948 10         Session 840 912 62       Treatment/Activity Tolerance:  [] Patient tolerated treatment well [] Patient limited by fatigue  [x] Patient limited by pain  [] Patient limited by other medical complications  [] Other:     Prognosis: [x] Good [] Fair  [] Poor    Patient Requires Follow-up: [x] Yes  [] No    Plan:   [x] Continue per plan of care [] Alter current plan (see comments)  [] Plan of care initiated [] Hold pending MD visit [] Discharge    Plan for Next Session:  progress L knee ROM, strength, and progress gait away from crutches as tolerated      Electronically signed by: Chanel Hernandez PTA [id number]

## 2023-01-11 ENCOUNTER — TREATMENT (OUTPATIENT)
Dept: PHYSICAL THERAPY | Age: 48
End: 2023-01-11

## 2023-01-11 DIAGNOSIS — S83.92XA SPRAIN OF LEFT KNEE, UNSPECIFIED LIGAMENT, INITIAL ENCOUNTER: Primary | ICD-10-CM

## 2023-01-11 NOTE — PROGRESS NOTES
[facility]          Phone: [phone] Fax: [phone]    Physical Therapy Daily Treatment Note  Date:  2023    Patient Name:  Teodoro Worthy    :  1975  MRN: [id number]    Evaluating therapist: Ebony Clemente, PT, DPT  [id number]    Restrictions/Precautions:      Diagnosis:     Diagnosis Orders   1. Sprain of left knee, unspecified ligament, initial encounter            Treatment Diagnosis:    Insurance/Certification information:  [facility]  Referring Physician:  Elaina Dill DO  Plan of care signed (Y/N):    Visit# / total visits:   (C-9 3x/week for 4 weeks, exp 23)  Pain level: 8/10   Time In:  718  Time         Subjective:  Pt presents using B axillary crutchs, and knee immobilizer donned. No improvement in pt c/o. Pt continues to report increased pain during work shifts d/t constraints of work station and immobility following prolonged amb (approx 16 min amb time) to reach work station. Pt also cites increased pain today, noting that \" something felt out of place in my L knee, and when it finally popped it was heard by several coworkers. \"     Exercises:  Exercise/Equipment Resistance/Repetitions Other comments     Bike  X 10 mins  rocking back/forth Seat 8     Quad sets 10x 3 sec hold      Heel slides With sheet     LAQ Within available range flexion     SLR  X10 reps AROM      VMO SLR  X 10 reps AROM from therapist      SL abd  X 10 reps AROM       Seated heel slides w/ towel  HEP -- held d/t increased clicking      SAQ 82K                                                                               Other Therapeutic Activities:  Pt tolerated TE's but limited by clicking and discomfort.     Home Exercise Program:   [employer]sey [employer] sets  seated heel slides, ( home or work as able )     Manual Treatments:  N/A    Modalities: ultrasound to lateral knee x8 min, 3.3 MHz, 100% intensity     Time-in Time-out Total Time   82667  Evaluation Low Complexity      28169  Evaluation Med Complexity      39012  Evaluation High Complexity      65246  Ther Ex 718 739 21   25920  Neuro Re-ed        79356  Ther Activities        49540  Manual Therapy       25618  E-stim       76292  Ultrasound 431 963 10         Session 651 150 31       Treatment/Activity Tolerance:  [] Patient tolerated treatment well [] Patient limited by fatigue  [x] Patient limited by pain  [] Patient limited by other medical complications  [] Other:     Prognosis: [x] Good [] Fair  [] Poor    Patient Requires Follow-up: [x] Yes  [] No    Plan:   [x] Continue per plan of care [] Alter current plan (see comments)  [] Plan of care initiated [] Hold pending MD visit [] Discharge    Plan for Next Session:  progress L knee ROM, strength, and progress gait away from crutches as tolerated      Electronically signed by: Isac Mays PTA [id number]

## 2023-01-13 ENCOUNTER — TREATMENT (OUTPATIENT)
Dept: PHYSICAL THERAPY | Age: 48
End: 2023-01-13

## 2023-01-13 DIAGNOSIS — S83.92XA SPRAIN OF LEFT KNEE, UNSPECIFIED LIGAMENT, INITIAL ENCOUNTER: Primary | ICD-10-CM

## 2023-01-13 NOTE — PROGRESS NOTES
[facility]          Phone: [phone] Fax: [phone]    Physical Therapy Daily Treatment Note  Date:  2023    Patient Name:  Po Lockhart    :  1975  MRN: [id number]    Evaluating therapist: Celi Mccain PT, DPT  [id number]    Restrictions/Precautions:      Diagnosis:     Diagnosis Orders   1. Sprain of left knee, unspecified ligament, initial encounter              Treatment Diagnosis:    Insurance/Certification information:  [facility]  Referring Physician:  Rafael Garcia DO  Plan of care signed (Y/N):    Visit# / total visits:  10/12 (C-9 3x/week for 4 weeks, exp 23)  Pain level: 8/10   Time In:  0800  Time Out: 0835    Subjective:  Pt continues to have poor tolerance to L LE WB requiring B crutch support for all mobility. Ongoing cracking and popping on superior and lateral aspects of knee, and a pulling/\"tearing\" sensation medially with knee flexion both with TE's and functional mobility. Exercises:  Exercise/Equipment Resistance/Repetitions Other comments     Bike  X 10 mins  rocking back/forth Seat 9, d/t clicking and popping at seat 8     Quad sets 10x 3 sec hold      Heel slides With sheet     LAQ Within available range flexion     SLR  X10 reps AROM      VMO SLR  X 10 reps AROM from therapist      SL abd  X 10 reps AROM       Seated heel slides w/ towel  HEP -- held d/t increased clicking      SAQ 72M        S/L adduction 10x       Standing HS curl 10x in // bars with B UE support                                                      Other Therapeutic Activities:  Pt tolerated introduction of adduction and hamstring curl with pulling sensation during s/l adduction SLR and intermittent clicking/popping feeling in superior and lateral L knee during standing HS curl. Pt continues to have antalgic pattern requiring use of crutches for support d/t limited tolerance to L LE WB secondary to pain and sensation of instability.     Home Exercise Program:   LAQ, Quad sets 12/28 seated heel slides, ( home or work as able )     Manual Treatments:  N/A    Modalities: ultrasound to lateral knee x8 min, 3.3 MHz, 100% intensity     Time-in Time-out Total Time   20106  Evaluation Low Complexity      74687  Evaluation Med Complexity      31368  Evaluation High Complexity      72654  Ther Ex 0800 0825 25   75527  Neuro Re-ed        00145  Ther Activities        49884  Manual Therapy       86402  E-stim       46375  Ultrasound 0825 0835 10         Session 0800 0835 0835       Treatment/Activity Tolerance:  [] Patient tolerated treatment well [] Patient limited by fatigue  [x] Patient limited by pain  [] Patient limited by other medical complications  [] Other:     Prognosis: [x] Good [] Fair  [] Poor    Patient Requires Follow-up: [x] Yes  [] No    Plan:   [x] Continue per plan of care [] Alter current plan (see comments)  [] Plan of care initiated [] Hold pending MD visit [] Discharge    Plan for Next Session:  progress L knee ROM, strength, and progress gait away from crutches as tolerated      Electronically signed by: Nidhi Reese, PT, DPT  [id number]

## 2023-01-16 ENCOUNTER — TREATMENT (OUTPATIENT)
Dept: PHYSICAL THERAPY | Age: 48
End: 2023-01-16
Payer: COMMERCIAL

## 2023-01-16 DIAGNOSIS — S83.92XA SPRAIN OF LEFT KNEE, UNSPECIFIED LIGAMENT, INITIAL ENCOUNTER: Primary | ICD-10-CM

## 2023-01-16 PROCEDURE — 97110 THERAPEUTIC EXERCISES: CPT

## 2023-01-16 PROCEDURE — 97035 APP MDLTY 1+ULTRASOUND EA 15: CPT

## 2023-01-16 NOTE — PROGRESS NOTES
[facility]          Phone: [phone] Fax: [phone]    Physical Therapy Daily Treatment Note  Date:  2023    Patient Name:  Jonel Leal    :  1975  MRN: [id number]    Evaluating therapist: Lino Alejandra PT, DPT  [id number]    Restrictions/Precautions:      Diagnosis:     Diagnosis Orders   1. Sprain of left knee, unspecified ligament, initial encounter              Treatment Diagnosis:    Insurance/Certification information:  [facility]  Referring Physician:  River Bowman DO  Plan of care signed (Y/N):    Visit# / total visits:   (C-9 3x/week for 4 weeks, exp 23)  Pain level: 8/10   Time In:  9221  Time Out: 0834    Subjective:  Pt continues to have poor tolerance to L LE WB requiring B crutch support for all mobility. Ongoing cracking and popping on superior and lateral aspects of knee, and a pulling/\"tearing\" sensation medially with knee flexion both with TE's and functional mobility. Exercises:  Exercise/Equipment Resistance/Repetitions Other comments     Bike  X 10 mins  rocking back/forth Seat 9, d/t clicking and popping at seat 8     Quad sets 10x 3 sec hold      Heel slides With sheet     LAQ Within available range flexion     SLR  X10 reps AROM      VMO SLR  X 10 reps AROM from therapist      SL abd  X 10 reps AROM       Seated heel slides w/ towel  HEP -- held d/t increased clicking      SAQ 75Z        S/L adduction 10x       Standing HS curl 10x in // bars with B UE support                                                      Other Therapeutic Activities:  Pt tolerated TE with continued pulling sensation during s/l adduction SLR and intermittent clicking/popping feeling in superior and lateral L knee during standing HS curl. Pt continues to have antalgic pattern requiring use of crutches for support d/t limited tolerance to L LE WB secondary to pain and sensation of instability. Noted pocket of edema lateral L knee this date.     Home Exercise Program:   LAQ, Quad sets 12/28 seated heel slides, ( home or work as able )     Manual Treatments:  N/A    Modalities: ultrasound to lateral knee x8 min, 3.3 MHz, 100% intensity     Time-in Time-out Total Time   68182  Evaluation Low Complexity      55948  Evaluation Med Complexity      23139  Evaluation High Complexity      62128  Ther Ex 0755 0824 29   39702  Neuro Re-ed        21700  Ther Activities        92796  Manual Therapy       88815  E-stim       93764  Ultrasound 0824 0834 10         Session 0755 0834 0839       Treatment/Activity Tolerance:  [] Patient tolerated treatment well [] Patient limited by fatigue  [x] Patient limited by pain  [] Patient limited by other medical complications  [] Other:     Prognosis: [x] Good [] Fair  [] Poor    Patient Requires Follow-up: [x] Yes  [] No    Plan:   [x] Continue per plan of care [] Alter current plan (see comments)  [] Plan of care initiated [] Hold pending MD visit [] Discharge    Plan for Next Session:  progress L knee ROM, strength, and progress gait away from crutches as tolerated      Electronically signed by: Jennifer Vegas PTA [id number]

## 2023-01-18 ENCOUNTER — TREATMENT (OUTPATIENT)
Dept: PHYSICAL THERAPY | Age: 48
End: 2023-01-18
Payer: COMMERCIAL

## 2023-01-18 DIAGNOSIS — S83.92XA SPRAIN OF LEFT KNEE, UNSPECIFIED LIGAMENT, INITIAL ENCOUNTER: Primary | ICD-10-CM

## 2023-01-18 PROCEDURE — 97110 THERAPEUTIC EXERCISES: CPT | Performed by: PHYSICAL THERAPIST

## 2023-01-18 PROCEDURE — 97035 APP MDLTY 1+ULTRASOUND EA 15: CPT | Performed by: PHYSICAL THERAPIST

## 2023-01-18 NOTE — PROGRESS NOTES
[facility]                Phone: [phone] Fax: [phone]    Physical Therapy  Outpatient Discharge Summary     Date:  2023    Patient Name:  Helena Gonzalez    :  1975  MRN: [id number]    DIAGNOSIS:     Diagnosis Orders   1. Sprain of left knee, unspecified ligament, initial encounter          REFERRING PHYSICIAN:  Shannan Sin DO    ATTENDANCE:  Pt has attended 12 of 12 scheduled treatments from 22 to 23. TREATMENTS RECEIVED:  Skilled PT services included strengthening and ROM TE's for L knee mobility and stability. Gait training when attempting single crutch ambulation. Ultrasound for edema management and to promote soft tissue healing. INITIAL STATUS:  Pain reported 6-8/10  ROM decreased in L knee flexion -- 0-52°  Strength decreased throughout L LE -- Hip: 3+/5, Knee: Flexion 3/5,  Extension 2+/5  Decreased functional ability with walking, stairs, standing, sitting, work, ADLs , use of left lower extremity, limited tolerance to weightbearing tasks and weightbearing duration  Lower Extremity Functional Scale (LEFS):     FINAL STATUS:  Pain reported 6-8/10  L Knee ROM: 0-60°  Strength improved throughout L LE -- Hip: 4/5, Knee: Flexion 4-/5,  Extension 3+/5  Persistent difficulty with amb on level surfaces and stairs d/t L LE WB limitation secondary to pain and instability  Lower Extremity Functional Scale (LEFS): 10/80    GOALS:  1 out of 6 Long Term Goals were obtained. LONG TERM GOALS NOT OBTAINED/REASON: Unable to decrease pain levels and persistent difficulty with WB limiting progression of amb and stair negotiation.  Strength and ROM continue to be limited secondary to pain and abrupt clicking/popping of L knee    PATIENT GOALS:  pain relief, full use of leg, get back to normal, return to work, get rid of assistive device --Not met    REASON FOR DISCHARGE:  Lack of progress with therapy interventions; recommending orthopedic evaluation to determine etiology of clicking/popping and ongoing difficulty with WB tolerance on L LE    PATIENT EDUCATION/INSTRUCTIONS:  continue with HEP to maintain strength gained with therapy    RECOMMENDATIONS:  follow up with orthopedic surgeon prior to resuming therapy if indicated. Thank you for the opportunity to work with your patient. If you have questions or comments, please feel free to contact me by phone or fax.       Electronically Signed by: Meg Hunter PT, DPT  [id number] 1/18/2023

## 2023-01-18 NOTE — PROGRESS NOTES
[facility]          Phone: [phone] Fax: [phone]    Physical Therapy Daily Treatment Note  Date:  2023    Patient Name:  Germaine Colmenares    :  1975  MRN: [id number]    Evaluating therapist: Rowan Lyn PT, DPT  [id number]    Restrictions/Precautions:      Diagnosis:     Diagnosis Orders   1. Sprain of left knee, unspecified ligament, initial encounter            Treatment Diagnosis:    Insurance/Certification information:  [facility]  Referring Physician:  Ernesto Fofana DO  Plan of care signed (Y/N):    Visit# / total visits:   (C-9 3x/week for 4 weeks, exp 23)  Pain level: 7/10   Time In:  0917  Time Out: 09    Subjective:  Pt continues to have poor tolerance to L LE WB requiring B crutch support for all mobility. She reports increased inferior L knee edema over past several days. Pt has scheduled evaluation with orthopedics on 23. Exercises:  Exercise/Equipment Resistance/Repetitions Other comments     Bike  X 10 mins  rocking back/forth Seat 10     Quad sets 10x 3 sec hold      Heel slides With sheet     LAQ Within available range flexion     SLR  X10 reps AROM      VMO SLR  X 10 reps AROM      SL abd  X 10 reps AROM       Seated heel slides w/ towel  HEP -- held d/t increased clicking      SAQ 63A        S/L adduction 10x       Standing HS curl 10x in // bars with B UE support                                   L LE MMT: hip 4-/5, knee flexion 4-/5, knee ext 3+/5 limited by pain     L knee: 0-60°              Other Therapeutic Activities:  Pt has ongoing clicking/popping with variable degrees of L knee flexion and persistent difficulty with L LE WB requiring B axillary crutches for all mobility.  She has demonstrated good compliance with HEP and attendance but no significant functional improvements noted both within session or at home d/t limited ROM and pain-dominant behavior of L knee symptoms hindering activity tolerance and progression. Pt is able to tolerate approx 25% WB on LLE; sensation of near buckling with attempts for 50% with B UE support. Recommending follow up with orthopedics for further evaluation prior to continuation of therapy if indicated.     Home Exercise Program:   Earl Wise sets 12/28 seated heel slides, ( home or work as able )     Manual Treatments:  N/A    Modalities: ultrasound to lateral knee x8 min, 3.3 MHz, 100% intensity     Time-in Time-out Total Time   22526  Evaluation Low Complexity      09514  Evaluation Med Complexity      69262  Evaluation High Complexity      20252  Ther Ex 0917 0936 19   44640  Neuro Re-ed        23156  Ther Activities        83579  Manual Therapy       91267  E-stim       23405  Ultrasound 0936 0946 10         Session 0917 0946 29       Treatment/Activity Tolerance:  [] Patient tolerated treatment well [] Patient limited by fatigue  [x] Patient limited by pain  [] Patient limited by other medical complications  [] Other:     Prognosis: [x] Good [] Fair  [] Poor    Patient Requires Follow-up: [] Yes  [x] No    Plan:   [] Continue per plan of care [] Alter current plan (see comments)  [] Plan of care initiated [x] Hold pending MD visit [x] Discharge    Plan for Next Session:  N/A      Electronically signed by: Leno Santana PT, DPT  [id number]

## 2023-01-24 ENCOUNTER — OFFICE VISIT (OUTPATIENT)
Dept: ORTHOPEDIC SURGERY | Age: 48
End: 2023-01-24

## 2023-01-24 VITALS — TEMPERATURE: 98 F | HEIGHT: 63 IN | BODY MASS INDEX: 24.1 KG/M2 | WEIGHT: 136 LBS

## 2023-01-24 DIAGNOSIS — S83.92XA SPRAIN OF LEFT KNEE, UNSPECIFIED LIGAMENT, INITIAL ENCOUNTER: Primary | ICD-10-CM

## 2023-01-24 NOTE — PROGRESS NOTES
Chief Complaint   Patient presents with    Knee Pain     Left Knee, [facility], DOI 12/08/2022, while pushing something, knee twisted and popped, while trying to stop. Has done 1 month of PT so far. Still working light duty, walking with crutches. Has had Xray and MRI done       Subjective:     Patient ID: Shaheen Guerrero is a 56 y.o..  female    Knee Pain  Patient complains of left knee pain. This is evaluated as a workers compensation injury. There was a history of injury. The pain began 6 weeks ago. The pain is located medial. She describes  Her symptoms as aching, sharp, shooting, and stabbing. She has experienced popping, clicking, locking, and giving way in the affected knee. The patient has not had pain with kneeling, squating, and climbing stairs. Symptoms improve with rest, heat, ice, medication: Ibuprofen used but not effective, physical therapy. The symptoms are worse with activity. The knee has given out or felt unstable. The patient cannot bend and straighten the knee fully. The patient is active in none. Treatment to date has been ice, heat, Tylenol, NSAID's, knee sleeve/brace, therapy, without significant relief. The patient is working. The patients occupation is working light duty [employer] was .      Past Medical History:   Diagnosis Date    Bulging lumbar disc     Compression of nerve     Headache(784.0)     Insomnia     Tachycardia     takes metoprolol for irreg HR      Past Surgical History:   Procedure Laterality Date    BICEPS TENDON REPAIR Right     BREAST ENHANCEMENT SURGERY Bilateral     BREAST SURGERY  augmentation    HEMORROIDECTOMY N/A 5/7/2019    EXAM UNDER ANESTHESIA HEMORRHOIDECTOMY performed by Mat Sanchez MD at [address] ARTHROSCOPY  right    MEDICATION INJECTION Left 7/26/2021    LEFT SACROILIAC JOINT INJECTION UNDER FLUOROSCOPY performed by Kenyetta Winters MD at [facility] Right 10 12 2015    lumbar right transforaminal nerve block #1 l4-5 l5-s1    NERVE BLOCK Right 10 22 2015    Transforaminal lumbar nerve block #2    NERVE BLOCK Right 11 02 2015    transforaminal nerve block right lumbar #3    NERVE BLOCK  11/11/15    sacroiliac joint right #1    NERVE BLOCK  11/18/15    sacroiliac joint right #2    NERVE BLOCK  11/25/15    sacroiliac joint right #3    NERVE BLOCK Right 1 18 15    hip inj #1    NERVE BLOCK Right 02/01/16    hip injection #2    NERVE BLOCK Bilateral 07/25/2016    Bilateral paravertebral facet lumbar #1    NERVE BLOCK Bilateral 08/17/2016    lumbar paravertebral facet #2    NERVE BLOCK Bilateral 5/6/2021    # 1 BILATERAL LUMBAR MEDIAL BRANCH NERVE BLOCK UNDER FLUOROSCOPIC GUIDANCE AT L2, L3, L4 AND L5 DORSAL RAMI performed by Monica Juárez MD at [address] [place] [address] Right 6/22/2021    LUMBAR TRANSFORAMINAL EPIDURAL STEROID INJECTION RIGHT L5 AND S1 UNDER FLUOROSCOPIC GUIDANCE (CPT 33713) performed by Monica Juárez MD at [place] [address] 10/12/2021    [address] [place] [address] L2,L3,L4 AND ,L5 DORSAL RAMI WITH XRAY  (CPT 92756) performed by Monica Juárez MD at [address] [place] [address] Right 09/10/2018    laser upper eyelid    PAIN MANAGEMENT PROCEDURE Bilateral 12/7/2021    BILATERAL LUMBAR RADIOFREQUENCY ABLATION UNDER FLUOROSCOPIC GUIDANCE AT L2, L3, L4 AND L5 WITH SEDATION (CPT K5539289) performed by Monica Juárez MD at [place] [address] OFFICE/OUTPT [facility] Right 9/10/2018    ER YAG LASER ABLATION RIGHT UPPER EYELID SYMPTOMATIC performed by Morgan Cantrell MD at [address] Right 5/14/2014    Arthroplasty, Shoulder    ROTATOR CUFF REPAIR Right        Current Outpatient Medications:     medroxyPROGESTERone (DEPO-PROVERA) 150 MG/ML injection, INJECT INTO MUSCLE EVERY 12 WEEKS, Disp: , Rfl:     metoprolol succinate (TOPROL XL) 25 MG extended release tablet, TAKE 1 TABLET BY MOUTH NIGHTLY, Disp: 90 tablet, Rfl: 3    ibuprofen (ADVIL;MOTRIN) 200 MG tablet, Take 200 mg by mouth every 6 hours as needed for Pain Using 3-4 tabs every 6 hours. , Disp: , Rfl:     nabumetone (RELAFEN) 750 MG tablet, Take 1 tablet by mouth 2 times daily as needed for Pain (Patient not taking: Reported on 4/8/2022), Disp: 60 tablet, Rfl: 1  No Known Allergies  Social History     Socioeconomic History    Marital status:      Spouse name: Not on file    Number of children: Not on file    Years of education: Not on file    Highest education level: Not on file   Occupational History    Not on file   Tobacco Use    Smoking status: Every Day     Packs/day: 0.50     Years: 15.00     Pack years: 7.50     Types: Cigarettes    Smokeless tobacco: Never   Vaping Use    Vaping Use: Never used   Substance and Sexual Activity    Alcohol use: Yes     Alcohol/week: 0.0 standard drinks     Comment: infrequent social wine    Drug use: No    Sexual activity: Not Currently     Partners: Male     Birth control/protection: Injection   Other Topics Concern    Not on file   Social History Narrative    Not on file     Social Determinants of Health     Financial Resource Strain: Low Risk     Difficulty of Paying Living Expenses: Not hard at all   Food Insecurity: No Food Insecurity    Worried About Running Out of Food in the Last Year: Never true    Ran Out of Food in the Last Year: Never true   Transportation Needs: Not on file   Physical Activity: Not on file   Stress: Not on file   Social Connections: Not on file   Intimate Partner Violence: Not on file   Housing Stability: Not on file     Family History   Problem Relation Age of Onset    Migraines Mother     High Blood Pressure Mother     Migraines Son     Cancer Father     Heart Disease Father     Cancer Sister          REVIEW OF SYSTEMS:     General/Constitution:  (-)weight loss, (-)fever, (-)chills, (-)weakness. Skin: (-) rash,(-) psoriasis,(-) eczema, (-)skin cancer. Musculoskeletal: (-) fractures,  (-) dislocations,(-) collagen vascular disease, (-) fibromyalgia, (-) multiple sclerosis, (-) muscular dystrophy, (-) RSD,(-) joint pain (-)swelling, (-) joint pain,swelling. Neurologic: (-) epilepsy, (-)seizures,(-) brain tumor,(-) TIA, (-)stroke, (-)headaches, (-)Parkinson disease,(-) memory loss, (-) LOC. Cardiovascular: (-) Chest pain, (-) swelling in legs/feet, (-) SOB, (-) cramping in legs/feet with walking. Respiratory: (-) SOB, (-) Coughing, (-) night sweats. GI: (-) nausea, (-) vomiting, (-) diarrhea, (-) blood in stool, (-) gastric ulcer. Psychiatric: (-) Depression, (-) Anxiety, (-) bipolar disease, (-) Alzheimer's Disease  Allergic/Immunologic: (-) allergies latex, (-) allergies metal, (-) skin sensitivity. Hematlogic: (-) anemia, (-) blood transfusion, (-) DVT/PE, (-) Clotting disorders    Subjective:    Constitution:  Temp 98 °F (36.7 °C)   Ht 5' 3\" (1.6 m)   Wt 136 lb (61.7 kg)   BMI 24.09 kg/m²     Psycihatric:  The patient is alert and oriented x 3, appears to be stated age and in no distress. Respiratory:  Respiratory effort is not labored. Patient is not gasping. Palpation of the chest reveals no tactile fremitus. Skin:  Upon inspection: the skin appears warm, dry and intact. There is  a previous scar over the affected area. There is any cellulitis, lymphedema or cutaneous lesions noted in the lower extremities. Upon palpation there is no induration noted. Neurologic:  Gait: antalgic; Motor exam of the lower extremities show ; quadriceps, hamstrings, foot dorsi and plantar flexors intact R.  5/5 and L. 5/5. Deep tendon reflexes are 2/4 at the knees and 2/4 at the ankles with strong extensor hallicus longus motor strength bilaterally. Sensory to both feet is intact to all sensory roots. Cardiovascular: The vascular exam is normal and is well perfused to distal extremities. Distal pulses DP/PT: R. 2+; L. 2+.   There is cap refill noted less than two seconds in all digits. There is not edema of the bilateral lower extremities. There is not varicosities noted in the distal extremities. Lymph:  Upon palpation,  there is no lymphadenopathy noted in bilateral lower extremities. Musculoskeletal:  Gait: antalgic; examination of the nails and digits reveal no cyanosis or clubbing. Lumbar exam:  On visual inspection, there is not deformity of the spine. full range of motion, no tenderness, palpable spasm or pain on motion. Special tests: Straight Leg Raise negative, Mili test negative. Hip exam:   Upon inspection, there is not deformity noted. Upon palpation there is not tenderness. ROM: is  full and symmetrical.   Strength: Hip Flexors 5/5; Hip Abductors 5/5; Hip Adduction 5/5. Knee exam:  Left knee exam shows;  range of motion of R. Knee is 0 to 120, and L. Knee is 0 to 105. The patient does have  pain on motion, effusion is mild, there is tenderness over the  anterior region, there are not any masses, there is not ligamentous instability, there is not  deformity noted. Knee exam: neither positive for moderate crepitations, some mild tenderness laxity is not noted with  stress. There is not a popliteal cyst.    R. Knee:  Lachman's negative, Anterior Drawer negative, Posterior Drawer negative  Andre's negative, Thallasy  negative,   PF grind test negative, Apprehension test negative, Patellar J sign  negative  L. Knee:  Lachman's negative, Anterior Drawer negative, Posterior Drawer negative  Andre's negative, Thallasy  negative,   PF grind test positive, Apprehension test negative,  Patellar J sign  negative    MRI  Exam:  Cruciate and collateral ligaments are intact. No meniscal tear is identified. No acute bony abnormality is seen. Cartilage fissure at the medial patellar facet with subchondral reactive   marrow changes. No knee joint effusion.      Radiographic findings reviewed with patient    Assessment:  Encounter Diagnoses   Name Primary? Sprain of left knee, unspecified ligament, initial encounter Yes       Plan:  Natural history and expected course discussed. Questions answered. Educational materials distributed. Rest, ice, compression, and elevation (RICE) therapy. Reduction in offending activity. I had a lengthy discussion with the patient regarding their diagnosis. I explained treatment options including surgical vs non surgical treatment. I reviewed in detail the risks and benefits and outlined the procedure in detail with expected outcomes and possible complications. I also discussed non surgical treatment such as injections (CSI and visco supplementation), physical therapy, topical creams and NSAID's. They have elected for conservative management at this time.    C9 for left knee CSI and brace and 6 more weeks of PT

## 2023-02-08 ENCOUNTER — OFFICE VISIT (OUTPATIENT)
Dept: ORTHOPEDIC SURGERY | Age: 48
End: 2023-02-08

## 2023-02-08 VITALS — BODY MASS INDEX: 24.1 KG/M2 | WEIGHT: 136 LBS | HEIGHT: 63 IN | TEMPERATURE: 98 F

## 2023-02-08 DIAGNOSIS — S83.92XA SPRAIN OF LEFT KNEE, UNSPECIFIED LIGAMENT, INITIAL ENCOUNTER: Primary | ICD-10-CM

## 2023-02-08 RX ORDER — TRIAMCINOLONE ACETONIDE 40 MG/ML
40 INJECTION, SUSPENSION INTRA-ARTICULAR; INTRAMUSCULAR ONCE
Status: COMPLETED | OUTPATIENT
Start: 2023-02-08 | End: 2023-02-08

## 2023-02-08 RX ADMIN — TRIAMCINOLONE ACETONIDE 40 MG: 40 INJECTION, SUSPENSION INTRA-ARTICULAR; INTRAMUSCULAR at 15:51

## 2023-02-08 NOTE — PROGRESS NOTES
Chief Complaint   Patient presents with    Knee Pain     Left knee cortisone injection . I will proceed with a cortisone injection in the Left knee. Verbal and written consent was obtained for the injections. The skin was prepped with alcohol. 1mL of Kenalog 40mg and 9mL of 0.25% Marcaine was  injected to Left knee. The injection was given through the lateral side of the knee. The patient tolerated the injection well. I will see the patient back in 4 weeks     Diagnosis Orders   1.  Sprain of left knee, unspecified ligament, initial encounter  86863 - CT DRAIN/INJECT LARGE JOINT/BURSA

## 2023-02-13 ENCOUNTER — EVALUATION (OUTPATIENT)
Dept: PHYSICAL THERAPY | Age: 48
End: 2023-02-13

## 2023-02-13 DIAGNOSIS — S83.92XA SPRAIN OF LEFT KNEE, UNSPECIFIED LIGAMENT, INITIAL ENCOUNTER: Primary | ICD-10-CM

## 2023-02-13 NOTE — PROGRESS NOTES
[facility] OUTPATIENT REHABILITATION  PHYSICAL THERAPY INITIAL EVALUATION         Date:  2023   Patient: Ama Salvador  : 1975  MRN: [id number]  Referring Provider: DO Mesfin Olea [facility] Diagnosis:      Diagnosis Orders   1. Sprain of left knee, unspecified ligament, initial encounter            Physician Order: Eval and Treat   Claim # [id number]  Dx: Nasim.Cogan. 92XA  DOI: 22   approved 12 visits through 3/13/2023     SUBJECTIVE:     Onset date: 2022    Onset: Sudden     History / Mechanism of Injury: Cb was moving 1500 lb raw foil on front lift, pulled back on cart and went to steer when feet slipped and knee twisted after audible pop. MRI and x-ray negative. Patient reports mechanical symptoms with locking and giving way during gait. Wearing hinged brace and using crutch for ambulation. Adds she feels like her long period of immobilization early-on compromised her mobility. Chief complaint: pain, edema, decreased motion, weakness, inability / limited ability to use leg, limited tolerance to weightbearing tasks and weightbearing duration 5 - 10 minutes, difficulty walking, difficulty with stairs, limited ability to complete ADLs (walking), limited ability to complete IADLs (cooking, cleaning, laundry), limited ability to lift/carry/handle material, limited ability to complete home/outdoor chores/tasks     Pain:  Current: 5/10     Best: 5/10     Worst:8/10 (occurs with weightbearing).   Pain returns to baseline in a few hours  Pain frequency: constant    Symptom Type / Quality:  ripping  Location[de-identified] Knee: medial, lateral    Aggravated by: walking, standing    Relieved by: rest, reduced weightbearing    Imaging results:    Narrative   EXAMINATION:   MRI OF THE LEFT KNEE WITHOUT CONTRAST, 2023 7:54 am       TECHNIQUE:   Multiplanar multisequence MRI of the left knee was performed without the administration of intravenous contrast.       COMPARISON:   Radiographs 12/08/2022       HISTORY:   ORDERING SYSTEM PROVIDED HISTORY: Sprain of left knee, unspecified ligament,   initial encounter   TECHNOLOGIST PROVIDED HISTORY:   Anatoliy Vieira ID:->[id number]       FINDINGS:   MENISCI: No meniscal tear identified. CRUCIATE LIGAMENTS: ACL and PCL are intact. EXTENSOR MECHANISM: Patellar and distal quadriceps tendons are intact. Medial and lateral patellar retinacula appear intact. LATERAL COLLATERAL LIGAMENT COMPLEX: Iliotibial band, fibular collateral   ligament, and biceps femoris tendon are intact. MEDIAL COLLATERAL LIGAMENT COMPLEX: The superficial and deep components of   the medial collateral ligament are intact. KNEE JOINT: No knee joint effusion. Tiny Baker's cyst.  There is a cartilage   fissure at the medial patellar facet with subchondral reactive marrow   changes. No discrete full-thickness cartilage defect is identified. BONE MARROW: No acute fracture or significant bone marrow edema. Impression   Cruciate and collateral ligaments are intact. No meniscal tear is identified. No acute bony abnormality is seen. Cartilage fissure at the medial patellar facet with subchondral reactive   marrow changes. No knee joint effusion. Narrative   EXAMINATION:   FOUR XRAY VIEWS OF THE LEFT KNEE       12/8/2022 6:25 am       COMPARISON:   02/14/2019       HISTORY:   ORDERING SYSTEM PROVIDED HISTORY: felt pop in knee, knee pain   TECHNOLOGIST PROVIDED HISTORY:   Reason for exam:->felt pop in knee, knee pain       FINDINGS:   No significant narrowing of the medial, lateral and patellofemoral joint   spaces is seen. Unremarkable alignment with no evidence of dislocation. No significant degenerative changes visualized.   No evidence of cortical   irregularity or lucency to suggest a fracture, no evidence of lytic or   sclerotic bone lesion is seen.       No evidence of suprapatellar effusion. The quadriceps and patellar tendons are unremarkable. Impression   No evidence of acute osseous abnormality is seen.        Past Medical History  Past Medical History:   Diagnosis Date    Bulging lumbar disc     Compression of nerve     Headache(784.0)     Insomnia     Tachycardia     takes metoprolol for irreg HR      Past Surgical History:   Procedure Laterality Date    BICEPS TENDON REPAIR Right     BREAST ENHANCEMENT SURGERY Bilateral     BREAST SURGERY  augmentation    HEMORROIDECTOMY N/A 5/7/2019    EXAM UNDER ANESTHESIA HEMORRHOIDECTOMY performed by Kennedy Severin, MD at [facility] ARTHROSCOPY  right    MEDICATION INJECTION Left 7/26/2021    LEFT SACROILIAC JOINT INJECTION UNDER FLUOROSCOPY performed by Shweta Hurtado MD at [address] [place] [address] Right 10 12 2015    lumbar right transforaminal nerve block #1 l4-5 l5-s1    NERVE BLOCK Right 10 22 2015    Transforaminal lumbar nerve block #2    NERVE BLOCK Right 11 02 2015    transforaminal nerve block right lumbar #3    NERVE BLOCK  11/11/15    sacroiliac joint right #1    NERVE BLOCK  11/18/15    sacroiliac joint right #2    NERVE BLOCK  11/25/15    sacroiliac joint right #3    NERVE BLOCK Right 1 18 15    hip inj #1    NERVE BLOCK Right 02/01/16    hip injection #2    NERVE BLOCK Bilateral 07/25/2016    Bilateral paravertebral facet lumbar #1    NERVE BLOCK Bilateral 08/17/2016    lumbar paravertebral facet #2    NERVE BLOCK Bilateral 5/6/2021    # 1 BILATERAL LUMBAR MEDIAL BRANCH NERVE BLOCK UNDER FLUOROSCOPIC GUIDANCE AT L2, L3, L4 AND L5 DORSAL RAMI performed by Shweta Hurtado MD at [address] [place] [address] Right 6/22/2021    LUMBAR TRANSFORAMINAL EPIDURAL STEROID INJECTION RIGHT L5 AND S1 UNDER FLUOROSCOPIC GUIDANCE (CPT 03577) performed by Shweta Hurtado MD at [facility].  10/12/2021    BILATERL LUMBAR MEDIAL BRANCH NERVE BLOCK L2,L3,L4 AND ,L5 DORSAL RAMI WITH XRAY  (CPT 97857) performed by Deyanira Bhandari MD at [facility] [address] Right 09/10/2018    laser upper eyelid    PAIN MANAGEMENT PROCEDURE Bilateral 12/7/2021    BILATERAL LUMBAR RADIOFREQUENCY ABLATION UNDER FLUOROSCOPIC GUIDANCE AT L2, L3, L4 AND L5 WITH SEDATION (CPT 99551) performed by Deyanira Bhandari MD at [place] [address] HEMIARTHROPLASTY Right 5/14/2014    Arthroplasty, Shoulder    [facility]ED[facility] Right 9/10/2018    ER YAG LASER ABLATION RIGHT UPPER EYELID SYMPTOMATIC performed by Mahesh Garrido MD at [facility]        Medications:   Current Outpatient Medications   Medication Sig Dispense Refill    medroxyPROGESTERone (DEPO-PROVERA) 150 MG/ML injection INJECT INTO MUSCLE EVERY 12 WEEKS      ibuprofen (ADVIL;MOTRIN) 200 MG tablet Take 200 mg by mouth every 6 hours as needed for Pain Using 3-4 tabs every 6 hours. nabumetone (RELAFEN) 750 MG tablet Take 1 tablet by mouth 2 times daily as needed for Pain (Patient not taking: Reported on 4/8/2022) 60 tablet 1    metoprolol succinate (TOPROL XL) 25 MG extended release tablet TAKE 1 TABLET BY MOUTH NIGHTLY 90 tablet 3     No current facility-administered medications for this visit. Occupation:   at [employer] . Physical demands include: lifting, carrying, pushing, pulling heavy weighted items (50 - 100 lbs Occasionally), walking, standing, stairs . Status: light / modified / transitional duty. Exercise regimen: none    Hobbies: yard work, working around [email or web address], DIY projects    Patient Goals: pain relief, return to prior activity    Precautions / Contraindications: none    OBJECTIVE:     Estimated body mass index is 24.09 kg/m² as calculated from the following:    Height as of 2/8/23: 5' 3\" (1.6 m). Weight as of 2/8/23: 136 lb (61.7 kg).      Observations: well nourished female, normal affect      Inspection: normal orthopedic exam    Edema: none    Gait: antalgic gait, limp L LE, ambulates with 1 crutch     Joint/Motion:    Knee:    Right:   AROM: 135° Flexion,  0° Extension  Left:   AROM: 82° Flexion,  -5° Extension    Strength:    Knee:   Right: Flexion 5/5,  Extension 5/5  Left: Flexion 3/5,  Extension 3/5    Palpation: Tender to palpation medial joint line, lateral joint line, patella rim. Special Tests/Functional Screens:    [x] Lachman's []+ / [x] -    [x] Anterior Drawer []+ / [x] -   [x] Valgus Stress []+ / [x] -  [] Thessaly Test []+ / [] -   [] Krystal's Sign: []+ / [] -  [] Other: []+ / [] - [x] Bounce Home [x]+ / [] -   [x] Andre []+ / [x] -   [] Pivot Shift []+ / [] -   [x] Posterior Drawer []+ / [x] -   [x] Varus Stress []+ / [x] -        Special test comments: Testing negative, however with reports of significant pain. ASSESSMENT     Cb has loss of motion, weakness, quad atrophy, altered gait. Treatment will be AROM, PROM, stretching, strengthening, gait training.      Outcome Measure:   Lower Extremity Functional Scale (LEFS) 76% impairment    Problems:   Pain 8/10 constant, waxing / waning  ROM decreased  Strength decreased   Limitations with ADLs , IADLs , use of left lower extremity, standing tolerance , walking, stairs, limited tolerance to weightbearing tasks and weightbearing duration, work, inability to participate in hobbies    [x] There are no barriers affecting plan of care or recovery    [] Barriers to this patient's plan of care or recovery include:     Domestic Concerns:  [x] No  [] Yes:    Short Term goals (2 weeks)  Pain 3/10  ROM 0 ext, 115 flex  Strength 4/5  Able to perform / complete the following functions / tasks: ADLs, tolerate weightbearing activities (standing, walking) for 30 minutes - 1 hour  Lower Extremity Functional Scale (LEFS) 50% impairment    Long Term goals (4 weeks)  Pain 0-1/10  ROM 0 ext, 135 flex  Strength 4+/5  Able to perform / complete the following functions / tasks: IADLs, normal gait, normal stair negotiation , reach / lift / carry medium weighted items in performance of home or work demands, tolerate weightbearing activities (standing, walking) for 2 - 4 hours  LEFS 30% impairment  Independent with home exercise program (HEP)    Rehab Potential: [x] Good  [] Fair  [] Poor    PLAN       Treatment Plan:   instruction in home exercise program   therapeutic exercise   therapeutic activity   neuromuscular re-education   balance training   proprioceptive training   work simulation     The following CPT codes are likely to be used in the care of this patient:   76851 PT Evaluation: Moderate Complexity   11821 PT Re-Evaluation   40407 Therapeutic Exercise   21707 Neuromuscular Re-Education   [phone] Therapeutic Activities     Suggested Professional Referral: [x] No  [] Yes:     Patient Education:  [x] Plans / Goals, Risks / Benefits discussed  [x] Home exercise program  Method of Education: [x] Verbal  [x] Demo  [x] Written  Comprehension of Education:  [x] Verbalizes understanding. [x] Demonstrates understanding. [] Needs Review. [] Demonstrates / verbalizes understanding of HEP / Liv Tracey previously given. Frequency: 3 days per week for 4 weeks    Patient understands diagnosis/prognosis and consents to treatment, plan and goals: [x] Yes    [] No     Thank you for the opportunity to work with your patient. If you have questions or comments, please contact me at [phone]; fax: [phone]. Electronically signed by: Russ Winn PT         Please sign Physician's Certification and return to: [address] [place] [address] PHYSICAL THERAPY  [address] [address]  Dept: [phone]  Dept Fax: [phone]  Loc: [phone] Certification / Comments     Frequency/Duration 3 days per week for 4 weeks. Certification period from 2/13/2023  to 5/13/2023.     I have reviewed the Plan of Care established for skilled therapy services and certify that the services are required and that they will be provided while the patient is under my care.     Physician's Comments/Revisions:               Physician's Printed Name:                                           [de-identified] Signature:                                                               Date:

## 2023-02-13 NOTE — PROGRESS NOTES
Physical Therapy Daily Treatment Note    Date: 2023  Patient Name: Timothy Garcia  : 1975   MRN: [id number]  DOInjury: 22  DOSx: --  Referring Provider: Nuzhat Meraz DO   [address] [facility]     Medical Diagnosis:      Diagnosis Orders   1. Sprain of left knee, unspecified ligament, initial encounter          Mary Orellana has loss of motion, weakness, quad atrophy, altered gait. Treatment will be AROM, PROM, stretching, strengthening, gait training. Outcome Measure:   Lower Extremity Functional Scale (LEFS) 76% impairment      Access Code: [email or web address]  URL: [email or web address]  Date: 2023  Prepared by: Jayme Police    Exercises  4 Way Patellar [place] - 2 x daily - 1 sets - 10 reps  Supine Heel Slide - 2 x daily - 3 sets - 10 reps  Supine Quad Set - 2 x daily - 3 sets - 10-15 reps - 5 sec hold  Supine Straight Leg Raises - 2 x daily - 3 sets - 10 reps      X = TO BE PERFORMED NEXT VISIT  > = PROGRESS TO THIS    S: See eval  O:    Time  4148-6026       Visit      Claim # [id number]  Dx: S83. 92XA  DOI: 22   approved 12 visits through 3/13/2023 Repeat outcome measure at mid point and end. Pain    Pain at rest 5/10  Pain with activity 8/10     ROM  -5 ext, 82 flex     Modalities          MO   Manual      Stretching knee flexion   MT   Stretching       Patella mobs X 30 all dir   TE   Prone hangs   TE   Heel props X  NR   Knee flex stretch-seated X  NR   Prone self flexion stretch   TE   Exercise       Nustep  X  TE   Quad sets 5 sec hold 3 x 10 reps  NR   Heel slides 3 x 10   NR   SLR 3 x 10  NR   LAQ X  May need to start with limited ROM if causes patellar pain. TE   Marching X  TA   Squat  X  TA   Step-ups - FWD  >  TA   Step-ups - LAT >  TA   Step-ups - BWD  >  TA   [] TG  [] Leg Press 2-leg X  May need to start with limited ROM if causes patellar pain.    TE   [] TG  [] Leg Press 1-leg   TE   CR  X  TE   Knee Extension Machine >  TE   HS curl machine >  NR   Kittitian split squat >  NR   Single leg dead lift  >           A: Tolerated well. Discussed anatomy, physiology, body mechanics, principles of loading, and progressive loading/activity. Feedback and cues necessary for developing neuromuscular control. Movement education and guided movement interventions such as verbal and tactile cues used to improve performance and control. Reviewed home exercise program extensively along with instructions for ice and elevation; written copy provided.     P: Continue with rehab plan  America Khan PT    Treatment Charges: Mins Units   Initial Evaluation 20 1   Re-Evaluation     Ther Exercise         TE     Manual Therapy     MT     Ther Activities        TA     Gait Training          GT     Neuro Re-education NR 25 2   Modalities     Non-Billable Service Time     Other     Total Time/Units 45 3

## 2023-02-15 ENCOUNTER — TREATMENT (OUTPATIENT)
Dept: PHYSICAL THERAPY | Age: 48
End: 2023-02-15

## 2023-02-15 DIAGNOSIS — S83.92XA SPRAIN OF LEFT KNEE, UNSPECIFIED LIGAMENT, INITIAL ENCOUNTER: Primary | ICD-10-CM

## 2023-02-15 NOTE — PROGRESS NOTES
Physical Therapy Daily Treatment Note    Date: 2/15/2023  Patient Name: Arin Johnson  : 1975   MRN: [id number]  DOInjury: 22  DOSx: --    Referring Provider:   Shaun Kirkland DO   [address] [facility]         Medical Diagnosis:    Diagnosis Orders   1. Sprain of left knee, unspecified ligament, initial encounter            Calvert Schlatter has loss of motion, weakness, quad atrophy, altered gait. Treatment will be AROM, PROM, stretching, strengthening, gait training. Outcome Measure:   Lower Extremity Functional Scale (LEFS) 76% impairment      Access Code: [email or web address]  URL: [email or web address]  Date: 2023  Prepared by: Pam Do    Exercises  4 Way Patellar [place] - 2 x daily - 1 sets - 10 reps  Supine Heel Slide - 2 x daily - 3 sets - 10 reps  Supine Quad Set - 2 x daily - 3 sets - 10-15 reps - 5 sec hold  Supine Straight Leg Raises - 2 x daily - 3 sets - 10 reps      X = TO BE PERFORMED NEXT VISIT  > = PROGRESS TO THIS    S:   O:    Time  8557-2768       Visit      Claim # [id number]  Dx: S83. 92XA  DOI: 22   approved 12 visits through 3/13/2023 Repeat outcome measure at mid point and end. Pain    5/10      ROM  -5 ext, 82 flex     Modalities          MO   Manual      Stretching knee flexion   MT   Stretching       Patella mobs X 30 all dir   TE   Prone hangs   TE   Heel props X 3 min   NR   Knee flex stretch-seated 3 x 30 sec hold  NR   Prone self flexion stretch   TE   Exercise       Nustep  L5 x 10 min   TE   Quad sets 5 sec hold 3 x 10 reps  NR   Heel slides 3 x 10   NR   SLR 3 x 10  NR   LAQ 3 x 10 reps  May need to start with limited ROM if causes patellar pain. TE   Marching X  TA   Squat  X  TA   Step-ups - FWD  >  TA   Step-ups - LAT >  TA   Step-ups - BWD  >  TA   [] TG  [] Leg Press 2-leg X  May need to start with limited ROM if causes patellar pain.    TE   [] TG  [] Leg Press 1-leg   TE   CR  X  TE   Knee Extension Machine >  TE   HS curl machine >  NR   Citizen of [place] split squat >  NR   Single leg dead lift  >           A: Tolerated well. Discussed anatomy, physiology, body mechanics, principles of loading, and progressive loading/activity. Feedback and cues necessary for developing neuromuscular control. Movement education and guided movement interventions such as verbal and tactile cues used to improve performance and control. Reviewed home exercise program extensively along with instructions for ice and elevation; written copy provided. P: Continue with rehab plan. Patient has [facility] for appointments.    Дмитрий Davey PTA    Treatment Charges: Mins Units   Initial Evaluation     Re-Evaluation     Ther Exercise         TE     Manual Therapy     MT     Ther Activities        TA 15 1   Gait Training          GT     Neuro Re-education NR 30 2   Modalities     Non-Billable Service Time     Other     Total Time/Units 45 3

## 2023-02-17 ENCOUNTER — TREATMENT (OUTPATIENT)
Dept: PHYSICAL THERAPY | Age: 48
End: 2023-02-17

## 2023-02-17 DIAGNOSIS — S83.92XA SPRAIN OF LEFT KNEE, UNSPECIFIED LIGAMENT, INITIAL ENCOUNTER: Primary | ICD-10-CM

## 2023-02-17 NOTE — PROGRESS NOTES
Physical Therapy Daily Treatment Note    Date: 2023  Patient Name: Derek Jacinto  : 1975   MRN: [id number]  DOInjury: 22  DOSx: --    Referring Provider:   Carissa Foote DO   [address] [facility]         Medical Diagnosis:    Diagnosis Orders   1. Sprain of left knee, unspecified ligament, initial encounter            Susana Jose has loss of motion, weakness, quad atrophy, altered gait. Treatment will be AROM, PROM, stretching, strengthening, gait training. Outcome Measure:   Lower Extremity Functional Scale (LEFS) 76% impairment      Access Code: [email or web address]  URL: [email or web address]  Date: 2023  Prepared by: Gracie Elder    Exercises  4 Way Patellar [place] - 2 x daily - 1 sets - 10 reps  Supine Heel Slide - 2 x daily - 3 sets - 10 reps  Supine Quad Set - 2 x daily - 3 sets - 10-15 reps - 5 sec hold  Supine Straight Leg Raises - 2 x daily - 3 sets - 10 reps      X = TO BE PERFORMED NEXT VISIT  > = PROGRESS TO THIS    S: Patient reports she had increased soreness after last session. O:    Time  [id number]      Visit  3/12    Claim # [id number]  Dx: Jorje. 92XA  DOI: 22   approved 12 visits through 3/13/2023 Repeat outcome measure at mid point and end. Pain    6/10      ROM  -5 ext, 93 flex 2023    Modalities          MO   Manual      Stretching knee flexion   MT   Stretching       Patella mobs X 30 all dir   TE   Prone hangs   TE   Heel props X 5 min   NR   Knee flex stretch-seated 3 x 30 sec hold  NR   Prone self flexion stretch   TE   Exercise       Nustep  L5 x 10 min   TE   Quad sets 5 sec hold 3 x 10 reps Towel NR   Heel slides 3 x 10   NR   SLR 3 x 10  NR   LAQ 3 x 10 reps  May need to start with limited ROM if causes patellar pain. TE   Marching X  TA   Squat  X  TA   Step-ups - FWD  >  TA   Step-ups - LAT >  TA   Step-ups - BWD  >  TA   [] TG  [] Leg Press 2-leg X  May need to start with limited ROM if causes patellar pain.    TE   [] TG  [] Leg Press 1-leg   TE   CR  X  TE   Knee Extension Machine >  TE   HS curl machine >  NR   [place]vad[place] split squat >  NR   Single leg dead lift  >           A: Tolerated well. Discussed anatomy, physiology, body mechanics, principles of loading, and progressive loading/activity. Feedback and cues necessary for developing neuromuscular control. Movement education and guided movement interventions such as verbal and tactile cues used to improve performance and control. Reviewed home exercise program extensively along with instructions for ice and elevation; written copy provided. P: Continue with [facility] plan[facility]    Nia Arriaza PTA    Treatment Charges: Mins Units   Initial Evaluation     Re-Evaluation     Ther Exercise         TE     Manual Therapy     MT     Ther Activities        TA 15 1   Gait Training          GT     Neuro Re-education NR 15 1   Modalities     Non-Billable Service Time 10 0   Other     Total Time/Units 45 3

## 2023-02-20 ENCOUNTER — TREATMENT (OUTPATIENT)
Dept: PHYSICAL THERAPY | Age: 48
End: 2023-02-20

## 2023-02-20 DIAGNOSIS — S83.92XA SPRAIN OF LEFT KNEE, UNSPECIFIED LIGAMENT, INITIAL ENCOUNTER: Primary | ICD-10-CM

## 2023-02-20 NOTE — PROGRESS NOTES
Physical Therapy Daily Treatment Note    Date: 2023  Patient Name: Diamond Murcia  : 1975   MRN: [id number]  DOInjury: 22  DOSx: --    Referring Provider:   Xiomara Condon DO   [address] [facility]         Medical Diagnosis:    Diagnosis Orders   1. Sprain of left knee, unspecified ligament, initial encounter              Ariana Avendano has loss of motion, weakness, quad atrophy, altered gait. Treatment will be AROM, PROM, stretching, strengthening, gait training. Outcome Measure:   Lower Extremity Functional Scale (LEFS) 76% impairment      Access Code: [email or web address]  URL: [email or web address]  Date: 2023  Prepared by: Tre Reach    Exercises  4 Way Patellar [place] - 2 x daily - 1 sets - 10 reps  Supine Heel Slide - 2 x daily - 3 sets - 10 reps  Supine Quad Set - 2 x daily - 3 sets - 10-15 reps - 5 sec hold  Supine Straight Leg Raises - 2 x daily - 3 sets - 10 reps      X = TO BE PERFORMED NEXT VISIT  > = PROGRESS TO THIS    S: Patient reports she had increased soreness after last session. O:    Time  7316-4767      Visit      Claim # [id number]  Dx: S83. 92XA  DOI: 22   approved 12 visits through 3/13/2023 Repeat outcome measure at mid point and end. Pain    5/10      ROM  -5 ext, 96 flex 2023    Modalities          MO   Manual      Stretching knee flexion   MT   Stretching       Patella mobs X 30 all dir   TE   Prone hangs   TE   Heel props X 5 min   NR   Knee flex stretch-seated 3 x 1 min hold  NR   Prone self flexion stretch   TE   Exercise       Nustep  L5 x 10 min   TE   Quad sets Towel NR   Heel slides  NR   SLR  NR   LAQ 3 x 10 reps  May need to start with limited ROM if causes patellar pain.    TE   Marching 2 x 15   TA   Squat  X  TA   CR 2 x 15      Step-ups - FWD  >  TA   Step-ups - LAT >  TA   Step-ups - BWD  >  TA   [x] TG  [] Leg Press 2-leg L17 3 x 10  TE   [] TG  [] Leg Press 1-leg   TE      TE   Knee Extension Machine >  TE   HS curl machine >  NR   Burmese split squat >  NR   Single leg dead lift  >           A: Tolerated well. Discussed anatomy, physiology, body mechanics, principles of loading, and progressive loading/activity. Feedback and cues necessary for developing neuromuscular control.  Movement education and guided movement interventions such as verbal and tactile cues used to improve performance and control.  Reviewed home exercise program extensively along with instructions for ice and elevation; written copy provided.    P: Continue with [facility]hab plan[facility]   Penelope Ewing PTA    Treatment Charges: Mins Units   Initial Evaluation     Re-Evaluation     Ther Exercise         TE     Manual Therapy     MT     Ther Activities        TA 15 1   Gait Training          GT     Neuro Re-education NR 15 1   Modalities     Non-Billable Service Time 10 0   Other     Total Time/Units 40 2

## 2023-02-22 ENCOUNTER — TREATMENT (OUTPATIENT)
Dept: PHYSICAL THERAPY | Age: 48
End: 2023-02-22

## 2023-02-22 DIAGNOSIS — S83.92XA SPRAIN OF LEFT KNEE, UNSPECIFIED LIGAMENT, INITIAL ENCOUNTER: Primary | ICD-10-CM

## 2023-02-22 NOTE — PROGRESS NOTES
Physical Therapy Daily Treatment Note    Date: 2023  Patient Name: Sarahi Stuart  : 1975   MRN: [id number]  DOInjury: 22  DOSx: --    Referring Provider:   Mendoza Gomez DO   [address] [facility]         Medical Diagnosis:    Diagnosis Orders   1. Sprain of left knee, unspecified ligament, initial encounter            Lian Espinoza has loss of motion, weakness, quad atrophy, altered gait. Treatment will be AROM, PROM, stretching, strengthening, gait training. Outcome Measure:   Lower Extremity Functional Scale (LEFS) 76% impairment      Access Code: [email or web address]  URL: [email or web address]  Date: 2023  Prepared by: Jose Guadalupe Marina    Exercises  4 Way Patellar [place] - 2 x daily - 1 sets - 10 reps  Supine Heel Slide - 2 x daily - 3 sets - 10 reps  Supine Quad Set - 2 x daily - 3 sets - 10-15 reps - 5 sec hold  Supine Straight Leg Raises - 2 x daily - 3 sets - 10 reps      X = TO BE PERFORMED NEXT VISIT  > = PROGRESS TO THIS    S: Patient reports she had increased soreness after last session. O:    Time  8612-5531      Visit      Claim # [id number]  Dx: S83. 92XA  DOI: 22   approved 12 visits through 3/13/2023 Repeat outcome measure at mid point and end. Pain    5/10      ROM  -5 ext, 96 flex 2023    Modalities          MO   Manual      Stretching knee flexion   MT   Stretching       Patella mobs X 30 all dir   TE   Prone hangs   TE   Heel props X 5 min   NR   Knee flex stretch-seated 3 x 1 min hold  NR   Prone self flexion stretch   TE   Exercise       Nustep  L5 x 10 min  Seat 8  TE   Quad sets Towel NR   Heel slides  NR   SLR  NR   LAQ 3 x 10 reps  May need to start with limited ROM if causes patellar pain.    TE   Marching 2 x 15   TA   Squat  2 x 10  TA   CR 2 x 15      Step-ups - FWD  >  TA   Step-ups - LAT >  TA   Step-ups - BWD  >  TA   [x] TG  [] Leg Press 2-leg L17 3 x 10  TE   [] TG  [] Leg Press 1-leg   TE      TE   Knee Extension Machine >  TE HS curl machine >  NR   Slovenian split squat >  NR   Single leg dead lift  >           A: Tolerated well. Discussed anatomy, physiology, body mechanics, principles of loading, and progressive loading/activity. Feedback and cues necessary for developing neuromuscular control. Movement education and guided movement interventions such as verbal and tactile cues used to improve performance and control. Reviewed home exercise program extensively along with instructions for ice and elevation; written copy provided. P: Continue with rehab plan. [facility]    Emil Coley PTA    Treatment Charges: Mins Units   Initial Evaluation     Re-Evaluation     Ther Exercise         TE     Manual Therapy     MT     Ther Activities        TA 15 1   Gait Training          GT     Neuro Re-education NR 15 1   Modalities     Non-Billable Service Time 10 0   Other     Total Time/Units 40 2

## 2023-02-24 ENCOUNTER — TREATMENT (OUTPATIENT)
Dept: PHYSICAL THERAPY | Age: 48
End: 2023-02-24

## 2023-02-24 DIAGNOSIS — S83.92XA SPRAIN OF LEFT KNEE, UNSPECIFIED LIGAMENT, INITIAL ENCOUNTER: Primary | ICD-10-CM

## 2023-02-24 NOTE — PROGRESS NOTES
Physical Therapy Daily Treatment Note    Date: 2023  Patient Name: Rajeev Belle  : 1975   MRN: [id number]  DOInjury: 22  DOSx: --    Referring Provider:   Denisse Ramirez DO   [address] [facility]         Medical Diagnosis:    Diagnosis Orders   1. Sprain of left knee, unspecified ligament, initial encounter              Britany Harding has loss of motion, weakness, quad atrophy, altered gait. Treatment will be AROM, PROM, stretching, strengthening, gait training. Outcome Measure:   Lower Extremity Functional Scale (LEFS) 76% impairment      Access Code: [email or web address]  URL: [email or web address]  Date: 2023  Prepared by: Rodri Mane    Exercises  4 Way Patellar [place] - 2 x daily - 1 sets - 10 reps  Supine Heel Slide - 2 x daily - 3 sets - 10 reps  Supine Quad Set - 2 x daily - 3 sets - 10-15 reps - 5 sec hold  Supine Straight Leg Raises - 2 x daily - 3 sets - 10 reps      X = TO BE PERFORMED NEXT VISIT  > = PROGRESS TO THIS    S: Patient reports feeling the same. O: Removed brace during session  Time  5003-2409      Visit      Claim # [id number]  Dx: S83. 92XA  DOI: 22   approved 12 visits through 3/13/2023 Repeat outcome measure at mid point and end. Pain    5/10      ROM  AROM -5 ext, 97  100 PROM 2023    Modalities          MO   Manual      Stretching knee flexion   MT   Stretching       Patella mobs X 30 all dir   TE   Prone hangs   TE   Heel props X 5 min  Add weight NR   Knee flex stretch-seated 3 x 1 min hold  NR   Prone self flexion stretch   TE   Exercise       Nustep  L5 x 10 min  Seat 8  TE   Quad sets Towel NR   Heel slides  NR   SLR  NR   LAQ 3 x 10 reps  May need to start with limited ROM if causes patellar pain.   Add weight next TE   Marching 2 x 15   TA   Squat  2 x 10  TA   CR 2 x 15      Step-ups - FWD  >  TA   Step-ups - LAT >  TA   Step-ups - BWD  >  TA   [x] TG  [] Leg Press 2-leg L17 3 x 15  TE   [] TG  [] Leg Press 1-leg   TE TE   Knee Extension Machine >  TE   HS curl machine >  NR   Macanese split squat >  NR   Single leg dead lift  >           A: Tolerated well. Discussed anatomy, physiology, body mechanics, principles of loading, and progressive loading/activity. Feedback and cues necessary for developing neuromuscular control. Movement education and guided movement interventions such as verbal and tactile cues used to improve performance and control. Reviewed home exercise program extensively along with instructions for ice and elevation; written copy provided. P: Continue with rehab plan. Patient has [facility] for appointments.    Zoë Nava PTA    Treatment Charges: Mins Units   Initial Evaluation     Re-Evaluation     Ther Exercise         TE     Manual Therapy     MT     Ther Activities        TA 15 1   Gait Training          GT     Neuro Re-education NR 15 1   Modalities     Non-Billable Service Time 10 0   Other     Total Time/Units 40 2

## 2023-02-27 ENCOUNTER — TREATMENT (OUTPATIENT)
Dept: PHYSICAL THERAPY | Age: 48
End: 2023-02-27
Payer: COMMERCIAL

## 2023-02-27 DIAGNOSIS — S83.92XA SPRAIN OF LEFT KNEE, UNSPECIFIED LIGAMENT, INITIAL ENCOUNTER: Primary | ICD-10-CM

## 2023-02-27 PROCEDURE — 97530 THERAPEUTIC ACTIVITIES: CPT

## 2023-02-27 PROCEDURE — 97112 NEUROMUSCULAR REEDUCATION: CPT

## 2023-02-27 NOTE — PROGRESS NOTES
Physical Therapy Daily Treatment Note    Date: 2023  Patient Name: Dominique Rai  : 1975   MRN: [id number]  DOInjury: 22  DOSx: --    Referring Provider:   Nakita Villarreal DO   [address] [facility]         Medical Diagnosis:    Diagnosis Orders   1. Sprain of left knee, unspecified ligament, initial encounter                Tay Christy has loss of motion, weakness, quad atrophy, altered gait. Treatment will be AROM, PROM, stretching, strengthening, gait training. Outcome Measure: Lower Extremity Functional Scale (LEFS) 76% impairment      Access Code: [email or web address]  URL: [email or web address]  Date: 2023  Prepared by: Demond Mancilla    Exercises  4 Way Patellar [place] - 2 x daily - 1 sets - 10 reps  Supine Heel Slide - 2 x daily - 3 sets - 10 reps  Supine Quad Set - 2 x daily - 3 sets - 10-15 reps - 5 sec hold  Supine Straight Leg Raises - 2 x daily - 3 sets - 10 reps      X = TO BE PERFORMED NEXT VISIT  > = PROGRESS TO THIS    S: Patient reports feeling a little worse today. Had a hard time walking out of work this morning due to \"extreme pain that she states \"almost brought me to my knees\". Over weekend, she has had increased swelling, \"popping, cracking\" and noticing a difference in appearance compared to right knee where there is a raised area on lateral side she is concerned about. O: Removed brace during session  Time  0078-4237      Visit      Claim # [id number]  Dx: S83. 92XA  DOI: 22   approved 12 visits through 3/13/2023 Repeat outcome measure at mid point and end.     Pain    5/10      ROM  AROM -5 ext, 97  100 PROM 2023    Modalities          MO   Manual      Stretching knee flexion   MT   Stretching       Patella mobs X 30 all dir   TE   Prone hangs   TE   Heel props X 5 min  Add weight NR   Knee flex stretch-seated 3 x 1 min hold  NR   Prone self flexion stretch   TE   Exercise       Nustep  L5 x 10 min  Seat 8  TE   Quad sets Towel NR   Heel slides  NR   SLR  NR   LAQ 2# 3 x 10 reps  May need to start with limited ROM if causes patellar pain. TE   Marching 2 x 15   TA   Squat  2 x 10  TA   CR 2 x 15      Step-ups - FWD  6\" x 8 reps 2 occurrence of knee \"popping\" with pain following; second worse than first; stopped at 8 reps because of this TA   Step-ups - LAT >  TA   Step-ups - BWD  >  TA   [x] TG  [] Leg Press 2-leg L17 3 x 15  TE   [] TG  [] Leg Press 1-leg   TE      TE   Knee Extension Machine >  TE   HS curl machine >  NR   [facility] split squat >  NR   Single leg dead lift  >           A: Tolerated fair; patient states the knee feels like it's \"shifting\". Discussed anatomy, physiology, body mechanics, principles of loading, and progressive loading/activity. Feedback and cues necessary for developing neuromuscular control. Movement education and guided movement interventions such as verbal and tactile cues used to improve performance and control. Reviewed home exercise program extensively along with instructions for ice and elevation; written copy provided. P: Continue with rehab plan. Patient has [facility] for appointments.    Pa Yan PTA    Treatment Charges: Mins Units   Initial Evaluation     Re-Evaluation     Ther Exercise         TE     Manual Therapy     MT     Ther Activities        TA 15 1   Gait Training          GT     Neuro Re-education NR 15 1   Modalities     Non-Billable Service Time 15 0   Other     Total Time/Units 40 2

## 2023-02-28 ENCOUNTER — TREATMENT (OUTPATIENT)
Dept: PHYSICAL THERAPY | Age: 48
End: 2023-02-28

## 2023-02-28 DIAGNOSIS — S83.92XA SPRAIN OF LEFT KNEE, UNSPECIFIED LIGAMENT, INITIAL ENCOUNTER: Primary | ICD-10-CM

## 2023-02-28 NOTE — PROGRESS NOTES
Physical Therapy Daily Treatment Note    Date: 2023  Patient Name: Atif Crain  : 1975   MRN: [id number]  DOInjury: 22  DOSx: --    Referring Provider:   Sera Blankenship DO   [address] [facility]         Medical Diagnosis:    Diagnosis Orders   1. Sprain of left knee, unspecified ligament, initial encounter            Tena Malave has loss of motion, weakness, quad atrophy, altered gait. Treatment will be AROM, PROM, stretching, strengthening, gait training. Outcome Measure: Lower Extremity Functional Scale (LEFS) 76% impairment      Access Code: [email or web address]  URL: [email or web address]  Date: 2023  Prepared by: Octaviano Perez    Exercises  4 Way Patellar [place] - 2 x daily - 1 sets - 10 reps  Supine Heel Slide - 2 x daily - 3 sets - 10 reps  Supine Quad Set - 2 x daily - 3 sets - 10-15 reps - 5 sec hold  Supine Straight Leg Raises - 2 x daily - 3 sets - 10 reps      X = TO BE PERFORMED NEXT VISIT  > = PROGRESS TO THIS    S: Patient reports feeling the same. Over weekend, she has had increased swelling, \"popping, cracking\" and noticing a difference in appearance compared to right knee where there is a raised area on lateral side she is concerned about. O:   Time  0972-8369      Visit      Claim # [id number]  Dx: S83. 92XA  DOI: 22   approved 12 visits through 3/13/2023 Repeat outcome measure at mid point and end. Pain    5/10      ROM  AROM -5 ext, 97  100 PROM 2023    Modalities          MO   Manual      Stretching knee flexion   MT   Stretching       Patella mobs X 30 all dir   TE   Prone hangs   TE   Heel props 2# X 5 min   NR   Knee flex stretch-seated 3 x 1 min hold  NR   Prone self flexion stretch   TE   Exercise       Nustep  L5 x 10 min  Seat 8  TE   Quad sets Towel NR   Heel slides  NR   SLR  NR   LAQ 2# 3 x 10 reps  May need to start with limited ROM if causes patellar pain.    TE   Marching 2 x 15   TA   Squat  2 x 10  TA   CR 2 x 15 Step-ups - FWD  6\" 2 x 10 reps  TA   Step-ups - LAT >  TA   Step-ups - BWD  >  TA   [x] TG  [] Leg Press 2-leg L17 3 x 15  TE   [] TG  [] Leg Press 1-leg   TE      TE   Knee Extension Machine >  TE   HS curl machine >  NR   Danish split squat >  NR   Single leg dead lift  >           A: Tolerated fair; patient states the knee feels like it's \"shifting\". Discussed anatomy, physiology, body mechanics, principles of loading, and progressive loading/activity. Feedback and cues necessary for developing neuromuscular control. Movement education and guided movement interventions such as verbal and tactile cues used to improve performance and control. Reviewed home exercise program extensively along with instructions for ice and elevation; written copy provided. P: [facility]    Reed Mcmillan PTA    Treatment Charges: Mins Units   Initial Evaluation     Re-Evaluation     Ther Exercise         TE     Manual Therapy     MT     Ther Activities        TA 15 1   Gait Training          GT     Neuro Re-education NR 15 1   Modalities     Non-Billable Service Time 10 0   Other     Total Time/Units 40 2

## 2023-03-01 ENCOUNTER — TREATMENT (OUTPATIENT)
Dept: PHYSICAL THERAPY | Age: 48
End: 2023-03-01

## 2023-03-01 DIAGNOSIS — S83.92XA SPRAIN OF LEFT KNEE, UNSPECIFIED LIGAMENT, INITIAL ENCOUNTER: Primary | ICD-10-CM

## 2023-03-01 NOTE — PROGRESS NOTES
Physical Therapy Daily Treatment Note    Date: 3/1/2023  Patient Name: Katy Maloney  : 1975   MRN: [id number]  DOInjury: 22  DOSx: --    Referring Provider:   Zhang Quintanilla DO   [facility]         Medical Diagnosis:    Diagnosis Orders   1. Sprain of left knee, unspecified ligament, initial encounter              Mazariegos Asp has loss of motion, weakness, quad atrophy, altered gait. Treatment will be AROM, PROM, stretching, strengthening, gait training. Outcome Measure: Lower Extremity Functional Scale (LEFS) 76% impairment      Access Code: [email or web address]  URL: [email or web address]  Date: 2023  Prepared by: Azeem Licona    Exercises  4 Way Patellar [place] - 2 x daily - 1 sets - 10 reps  Supine Heel Slide - 2 x daily - 3 sets - 10 reps  Supine Quad Set - 2 x daily - 3 sets - 10-15 reps - 5 sec hold  Supine Straight Leg Raises - 2 x daily - 3 sets - 10 reps      X = TO BE PERFORMED NEXT VISIT  > = PROGRESS TO THIS    S: Patient reports no changes. O:   Time  4132-7832      Visit      Claim # [id number]  Dx: S83. 92XA  DOI: 22   approved 12 visits through 3/13/2023 Repeat outcome measure at mid point and end. Pain    5/10      ROM  AROM -5 ext, 100 3/1/2023    Modalities          MO   Manual      Stretching knee flexion   MT   Stretching       Patella mobs X 30 all dir   TE   Prone hangs   TE   Heel props 2# X 5 min   NR   Knee flex stretch-seated 3 x 1 min hold  NR   Prone self flexion stretch   TE   Exercise       Nustep  L5 x 10 min  Seat 8  TE   Quad sets Towel NR   Heel slides  NR   SLR  NR   LAQ 2# 3 x 10 reps  May need to start with limited ROM if causes patellar pain.    TE   Marching 2 x 15   TA   Squat  2 x 10  TA   CR 2 x 15      Step-ups - FWD  6\" x 4 reps Patient had 2 audible pops/cracks that caused temporary pain; deferred after second occurrence TA   Step-ups - LAT >  TA   Step-ups - BWD  >  TA   [x] TG  [] Leg Press 2-leg L17 3 x 15 TE   [] TG  [] Leg Press 1-leg   TE      TE   Knee Extension Machine >  TE   HS curl machine >  NR   Mohawk split squat >  NR   Single leg dead lift  >           A: Tolerated fair, still having pain with stairs and some swelling present that is limiting progression. Encouraged to continue stretching at home and ice after activity. Discussed anatomy, physiology, body mechanics, principles of loading, and progressive loading/activity. Feedback and cues necessary for developing neuromuscular control. Movement education and guided movement interventions such as verbal and tactile cues used to improve performance and control. Reviewed home exercise program extensively along with instructions for ice and elevation; written copy provided. P: Continue with rehab plan. [facility]    Hallie Gamez PTA    Treatment Charges: Mins Units   Initial Evaluation     Re-Evaluation     Ther Exercise         TE     Manual Therapy     MT     Ther Activities        TA 25 2   Gait Training          GT     Neuro Re-education NR 15 1   Modalities     Non-Billable Service Time     Other     Total Time/Units 40 3

## 2023-03-06 ENCOUNTER — TREATMENT (OUTPATIENT)
Dept: PHYSICAL THERAPY | Age: 48
End: 2023-03-06

## 2023-03-06 DIAGNOSIS — S83.92XA SPRAIN OF LEFT KNEE, UNSPECIFIED LIGAMENT, INITIAL ENCOUNTER: Primary | ICD-10-CM

## 2023-03-06 NOTE — PROGRESS NOTES
Physical Therapy Daily Treatment Note    Date: 3/6/2023  Patient Name: Donny Carrero  : 1975   MRN: [id number]  DOInjury: 22  DOSx: --    Referring Provider:   Yudi Sloan DO   [address] [facility]         Medical Diagnosis:    Diagnosis Orders   1. Sprain of left knee, unspecified ligament, initial encounter                Neo Lugo has loss of motion, weakness, quad atrophy, altered gait. Treatment will be AROM, PROM, stretching, strengthening, gait training. Outcome Measure: Lower Extremity Functional Scale (LEFS) 76% impairment      Access Code: [email or web address]  URL: [email or web address]  Date: 2023  Prepared by: Wilma Hankins    Exercises  4 Way Patellar [place] - 2 x daily - 1 sets - 10 reps  Supine Heel Slide - 2 x daily - 3 sets - 10 reps  Supine Quad Set - 2 x daily - 3 sets - 10-15 reps - 5 sec hold  Supine Straight Leg Raises - 2 x daily - 3 sets - 10 reps      X = TO BE PERFORMED NEXT VISIT  > = PROGRESS TO THIS    S: Patient reports no changes, continues to be painful with popping every now and then. O:   Time  9697-9972      Visit      Claim # [id number]  Dx: S83. 92XA  DOI: 22   approved 12 visits through 3/13/2023 Repeat outcome measure at mid point and end. Pain    5/10      ROM  AROM -5 ext, 100 3/1/2023    Modalities          MO   Manual      Stretching knee flexion   MT   Stretching       Patella mobs X 30 all dir   TE   Prone hangs   TE   Heel props 2# X 5 min   NR   Knee flex stretch-seated 3 x 1 min hold  NR   Prone self flexion stretch   TE   Exercise       Nustep  L5 x 10 min  Seat 8  TE   Quad sets Towel NR   Heel slides  NR   SLR  NR   LAQ 3# 3 x 10 reps  May need to start with limited ROM if causes patellar pain.    TE   Marching 2 x 15   TA   Squat  2 x 10  TA   CR 2 x 15      Step-ups - FWD  6\" x 20 reps Patient had 2 audible pops/cracks that caused temporary pain; deferred after second occurrence TA   Step-ups - LAT >  TA Step-ups - BWD  >  TA   [x] TG  [] Leg Press 2-leg L17 3 x 15  TE   [] TG  [] Leg Press 1-leg   TE      TE   Knee Extension Machine >  TE   HS curl machine >  NR   Slovak split squat >  NR   Single leg dead lift  >           A: Tolerated fair, still having pain with stairs and some swelling present that is limiting progression. Encouraged to continue stretching at home and ice after activity. Discussed anatomy, physiology, body mechanics, principles of loading, and progressive loading/activity. Feedback and cues necessary for developing neuromuscular control. Movement education and guided movement interventions such as verbal and tactile cues used to improve performance and control. Reviewed home exercise program extensively along with instructions for ice and elevation; written copy provided. P: Continue with rehab plan. Patient has [facility] for appointments.    Ede Harman PTA    Treatment Charges: Mins Units   Initial Evaluation     Re-Evaluation     Ther Exercise         TE     Manual Therapy     MT     Ther Activities        TA 15 1   Gait Training          GT     Neuro Re-education NR 15 1   Modalities     Non-Billable Service Time 10 0   Other     Total Time/Units 40 2

## 2023-03-08 ENCOUNTER — TREATMENT (OUTPATIENT)
Dept: PHYSICAL THERAPY | Age: 48
End: 2023-03-08

## 2023-03-08 DIAGNOSIS — S83.92XA SPRAIN OF LEFT KNEE, UNSPECIFIED LIGAMENT, INITIAL ENCOUNTER: Primary | ICD-10-CM

## 2023-03-08 NOTE — PROGRESS NOTES
Physical Therapy Daily Treatment Note    Date: 3/8/2023  Patient Name: Hema Ralph  : 1975   MRN: [id number]  DOInjury: 22  DOSx: --    Referring Provider:   Neomi Bosworth, DO   [address] [facility]         Medical Diagnosis:    Diagnosis Orders   1. Sprain of left knee, unspecified ligament, initial encounter              Savana Echevarria has loss of motion, weakness, quad atrophy, altered gait. Treatment will be AROM, PROM, stretching, strengthening, gait training. Outcome Measure: Lower Extremity Functional Scale (LEFS) 76% impairment      Access Code: [email or web address]  URL: [email or web address]  Date: 2023  Prepared by: Comfort Perez    Exercises  4 Way Patellar [place] - 2 x daily - 1 sets - 10 reps  Supine Heel Slide - 2 x daily - 3 sets - 10 reps  Supine Quad Set - 2 x daily - 3 sets - 10-15 reps - 5 sec hold  Supine Straight Leg Raises - 2 x daily - 3 sets - 10 reps      X = TO BE PERFORMED NEXT VISIT  > = PROGRESS TO THIS    S: Patient feels popping is becoming more frequent and still having \"pockets\" of swelling. Patient also reports when she lays down \"the inside of the knee feels like it's ripping\". Patient becoming discouraged due to minimal improvements in these issues which are causing limited progression in PT.  O:   Time  1620-1328      Visit  10/12    Claim # [id number]  Dx: Z10. 92XA  DOI: 22   approved 12 visits through 3/13/2023 Repeat outcome measure at mid point and end. Pain    5/10      ROM  AROM 0 ext, 105 3/8/2023    Modalities          MO   Manual      Stretching knee flexion   MT   Stretching       Patella mobs X 30 all dir   TE   Prone hangs   TE   Heel props 2# X 5 min   NR   Knee flex stretch-seated 3 x 1 min hold  NR   Prone self flexion stretch   TE   Exercise       Nustep  L5 x 10 min  Seat 8  TE   Quad sets Towel NR   Heel slides  NR   SLR  NR   LAQ 3# 3 x 10 reps  May need to start with limited ROM if causes patellar pain.    TE Marching 2 x 15   TA   Squat  2 x 10  TA   CR 2 x 15      Step-ups - FWD  6\" x 7 reps Patient had 5 audible pops/cracks when descending that caused temporary  TA   Step-ups - LAT >  TA   Step-ups - BWD  >  TA   [x] TG  [] Leg Press 2-leg L17 3 x 15  TE   [] TG  [] Leg Press 1-leg   TE      TE   Knee Extension Machine >  TE   HS curl machine >  NR   Kyrgyz split squat >  NR   Single leg dead lift  >           A: Improved motion noted but continues to have limited tolerance and reps during step ups due to pain when knee has audible popping/cracking. Encouraged to continue stretching at home and ice after activity. Discussed anatomy, physiology, body mechanics, principles of loading, and progressive loading/activity. Feedback and cues necessary for developing neuromuscular control. Movement education and guided movement interventions such as verbal and tactile cues used to improve performance and control. Reviewed home exercise program extensively along with instructions for ice and elevation; written copy provided. P: Continue with rehab plan. Patient has [facility] for appointments.    Yevgeniy Stout PTA    Treatment Charges: Mins Units   Initial Evaluation     Re-Evaluation     Ther Exercise         TE     Manual Therapy     MT     Ther Activities        TA 15 1   Gait Training          GT     Neuro Re-education NR 15 1   Modalities     Non-Billable Service Time 10 0   Other     Total Time/Units 40 2

## 2023-03-10 ENCOUNTER — TREATMENT (OUTPATIENT)
Dept: PHYSICAL THERAPY | Age: 48
End: 2023-03-10

## 2023-03-10 DIAGNOSIS — S83.92XA SPRAIN OF LEFT KNEE, UNSPECIFIED LIGAMENT, INITIAL ENCOUNTER: Primary | ICD-10-CM

## 2023-03-10 NOTE — PROGRESS NOTES
Physical Therapy Daily Treatment Note    Date: 3/10/2023  Patient Name: Jeffy French  : 1975   MRN: [id number]  DOInjury: 22  DOSx: --    Referring Provider:   Wilner Chi DO   [address]         Medical Diagnosis:    Diagnosis Orders   1. Sprain of left knee, unspecified ligament, initial encounter            Sara Angel has loss of motion, weakness, quad atrophy, altered gait. Treatment will be AROM, PROM, stretching, strengthening, gait training. Outcome Measure: Lower Extremity Functional Scale (LEFS) 76% impairment      Access Code: [email or web address]  URL: [email or web address]  Date: 2023  Prepared by: Stephanie Zhang    Exercises  4 Way Patellar [place] - 2 x daily - 1 sets - 10 reps  Supine Heel Slide - 2 x daily - 3 sets - 10 reps  Supine Quad Set - 2 x daily - 3 sets - 10-15 reps - 5 sec hold  Supine Straight Leg Raises - 2 x daily - 3 sets - 10 reps      X = TO BE PERFORMED NEXT VISIT  > = PROGRESS TO THIS    S: Patient felt she had more swelling yesterday after work. She feels popping is becoming more frequent and still having \"pockets\" of swelling. Patient also reports when she lays down \"the inside of the knee feels like it's ripping\". Patient becoming discouraged due to minimal improvements in these issues which are causing limited progression in PT.  O:   Time  2887-9690      Visit      Claim # [id number]  Dx: C60. 92XA  DOI: 22   approved 12 visits through 3/13/2023 Repeat outcome measure at mid point and end.     Pain    5/10      ROM  AROM 0 ext, 105 3/8/2023    Modalities          MO   Manual      Stretching knee flexion   MT   Stretching       Patella mobs X 30 all dir   TE   Prone hangs   TE   Heel props 2# X 5 min   NR   Knee flex stretch-seated 3 x 1 min hold  NR   Prone self flexion stretch   TE   Exercise       Nustep  L5 x 10 min  Seat 8  TE   Quad sets Towel NR   Heel slides  NR   SLR  NR   LAQ 3# 3 x 10 reps  May need to start with limited ROM if causes patellar pain. TE   Marching 2 x 15   TA   Squat  2 x 10  TA   CR 2 x 15      Step-ups - FWD  6\" x 7 reps Patient had 2 audible pops/cracks when descending that caused temporary pain TA   Step-ups - LAT >  TA   Step-ups - BWD  >  TA   [x] TG  [] Leg Press 2-leg L17 3 x 15  TE   [] TG  [] Leg Press 1-leg   TE      TE   Knee Extension Machine >  TE   HS curl machine >  NR   Luxembourgish split squat >  NR   Single leg dead lift  >           A: Improved motion noted but continues to have limited tolerance and reps during step ups due to pain when knee has audible popping/cracking. Encouraged to continue stretching at home and ice after activity. Discussed anatomy, physiology, body mechanics, principles of loading, and progressive loading/activity. Feedback and cues necessary for developing neuromuscular control. Movement education and guided movement interventions such as verbal and tactile cues used to improve performance and control. Reviewed home exercise program extensively along with instructions for ice and elevation; written copy provided. P: Today was last PT session and patient is to continue with HEP. [facility]    Malik Castano PTA    Treatment Charges: Mins Units   Initial Evaluation     Re-Evaluation     Ther Exercise         TE     Manual Therapy     MT     Ther Activities        TA 15 1   Gait Training          GT     Neuro Re-education NR 15 1   Modalities     Non-Billable Service Time 10 0   Other     Total Time/Units 40 2

## 2023-03-14 ENCOUNTER — OFFICE VISIT (OUTPATIENT)
Dept: ORTHOPEDIC SURGERY | Age: 48
End: 2023-03-14

## 2023-03-14 VITALS — HEIGHT: 63 IN | TEMPERATURE: 98 F | BODY MASS INDEX: 24.1 KG/M2 | WEIGHT: 136 LBS

## 2023-03-14 DIAGNOSIS — S83.92XA SPRAIN OF LEFT KNEE, UNSPECIFIED LIGAMENT, INITIAL ENCOUNTER: Primary | ICD-10-CM

## 2023-03-14 RX ORDER — CELECOXIB 200 MG/1
200 CAPSULE ORAL DAILY
Qty: 30 CAPSULE | Refills: 3 | Status: SHIPPED | OUTPATIENT
Start: 2023-03-14 | End: 2023-07-12

## 2023-03-14 NOTE — PROGRESS NOTES
Chief Complaint   Patient presents with    Knee Pain     1 month F/U Left Knee after CSI injection, [facility], states she did not get any relief from the injection and still has swelling. Just finished PT on Friday, and wearing a knee brace. Hector Fregoso returns today for follow-up of her left knee pain. she reports this is unchanged than when I saw her last.  The patient's pain level is a 7/10. The previous treatment was not successful.     Past Medical History:   Diagnosis Date    Bulging lumbar disc     Compression of nerve     Headache(784.0)     Insomnia     Tachycardia     takes metoprolol for irreg HR      Past Surgical History:   Procedure Laterality Date    BICEPS TENDON REPAIR Right     BREAST ENHANCEMENT SURGERY Bilateral     BREAST SURGERY  augmentation    HEMORROIDECTOMY N/A 5/7/2019    EXAM UNDER ANESTHESIA HEMORRHOIDECTOMY performed by Christal Noriega MD at [email or web address] ARTHROSCOPY  right    MEDICATION INJECTION Left 7/26/2021    LEFT SACROILIAC JOINT INJECTION UNDER FLUOROSCOPY performed by Crystal Villatoro MD at . [address] Right 10 12 2015    lumbar right transforaminal nerve block #1 l4-5 l5-s1    NERVE BLOCK Right 10 22 2015    Transforaminal lumbar nerve block #2    NERVE BLOCK Right 11 02 2015    transforaminal nerve block right lumbar #3    NERVE BLOCK  11/11/15    sacroiliac joint right #1    NERVE BLOCK  11/18/15    sacroiliac joint right #2    NERVE BLOCK  11/25/15    sacroiliac joint right #3    NERVE BLOCK Right 1 18 15    hip inj #1    NERVE BLOCK Right 02/01/16    hip injection #2    NERVE BLOCK Bilateral 07/25/2016    Bilateral paravertebral facet lumbar #1    NERVE BLOCK Bilateral 08/17/2016    lumbar paravertebral facet #2    NERVE BLOCK Bilateral 5/6/2021    # 1 BILATERAL LUMBAR MEDIAL BRANCH NERVE BLOCK UNDER FLUOROSCOPIC GUIDANCE AT L2, L3, L4 AND L5 DORSAL RAMI performed by Crystal Villatoro MD at . [address] Right 6/22/2021    LUMBAR TRANSFORAMINAL EPIDURAL STEROID INJECTION RIGHT L5 AND S1 UNDER FLUOROSCOPIC GUIDANCE (CPT 98372) performed by Kaveh Fields MD at [place] [address] 10/12/2021    [address] BLOCK L2,L3,L4 AND ,L5 DORSAL RAMI WITH XRAY  (CPT 26849) performed by Kaveh Fields MD at [address] [place] [address] Right 09/10/2018    laser upper eyelid    PAIN MANAGEMENT PROCEDURE Bilateral 12/7/2021    BILATERAL LUMBAR RADIOFREQUENCY ABLATION UNDER FLUOROSCOPIC GUIDANCE AT L2, L3, L4 AND L5 WITH SEDATION (CPT 27467) performed by Kaveh Fields MD at [place] [address] HEMIARTHROPLASTY Right 5/14/2014    Arthroplasty, Shoulder    LA OFFICE/OUTPT VISIT,PROCEDURE ONLY Right 9/10/2018    ER YAG LASER ABLATION RIGHT UPPER EYELID SYMPTOMATIC performed by Jeronimo Aguilar MD at [facility]        Current Outpatient Medications:     medroxyPROGESTERone (DEPO-PROVERA) 150 MG/ML injection, INJECT INTO MUSCLE EVERY 12 WEEKS, Disp: , Rfl:     metoprolol succinate (TOPROL XL) 25 MG extended release tablet, TAKE 1 TABLET BY MOUTH NIGHTLY, Disp: 90 tablet, Rfl: 3    ibuprofen (ADVIL;MOTRIN) 200 MG tablet, Take 200 mg by mouth every 6 hours as needed for Pain Using 3-4 tabs every 6 hours. , Disp: , Rfl:     nabumetone (RELAFEN) 750 MG tablet, Take 1 tablet by mouth 2 times daily as needed for Pain (Patient not taking: Reported on 4/8/2022), Disp: 60 tablet, Rfl: 1  No Known Allergies  Social History     Socioeconomic History    Marital status:      Spouse name: Not on file    Number of children: Not on file    Years of education: Not on file    Highest education level: Not on file   Occupational History    Not on file   Tobacco Use    Smoking status: Every Day     Packs/day: 0.50     Years: 15.00     Pack years: 7.50     Types: Cigarettes    Smokeless tobacco: Never   Vaping Use    Vaping Use: Never used Substance and Sexual Activity    Alcohol use: Yes     Alcohol/week: 0.0 standard drinks     Comment: infrequent social wine    Drug use: No    Sexual activity: Not Currently     Partners: Male     Birth control/protection: Injection   Other Topics Concern    Not on file   Social History Narrative    Not on file     Social Determinants of Health     Financial Resource Strain: Low Risk     Difficulty of Paying Living Expenses: Not hard at all   Food Insecurity: No Food Insecurity    Worried About Running Out of Food in the Last Year: Never true    Ran Out of Food in the Last Year: Never true   Transportation Needs: Not on file   Physical Activity: Not on file   Stress: Not on file   Social Connections: Not on file   Intimate Partner Violence: Not on file   Housing Stability: Not on file     Family History   Problem Relation Age of Onset    Migraines Mother     High Blood Pressure Mother     Migraines Son     Cancer Father     Heart Disease Father     Cancer Sister        Review of Systems:     Skin: (-) rash,(-) psoriasis,(-) eczema, (-)skin cancer. Musculoskeletal: (-) fractures,  (-) dislocations,(-) collagen vascular disease, (-) fibromyalgia, (-) multiple sclerosis, (-) muscular dystrophy, (-) RSD,(-) joint pain (-)swelling, (-) joint pain,swelling. Neurologic: (-) epilepsy, (-)seizures,(-) brain tumor,(-) TIA, (-)stroke, (-)headaches, (-)Parkinson disease,(-) memory loss, (-) LOC. Cardiovascular: (-) Chest pain, (-) swelling in legs/feet, (-) SOB, (-) cramping in legs/feet with walking. Constitutional:  The patient is alert and oriented x 3, appears to be stated age and in no distress. Temp 98 °F (36.7 °C)   Ht 5' 3\" (1.6 m)   Wt 136 lb (61.7 kg)   BMI 24.09 kg/m²     Skin:  Upon inspection: the skin appears warm, dry and intact. There is not a previous scar over the affected area. There is not any cellulitis, lymphedema or cutaneous lesions noted in the lower extremities.    Upon palpation there is no induration noted. Neurologic:  Gait: normal;  Motor exam of the lower extremities show ; quadriceps, hamstrings, foot dorsi and plantar flexors intact R.  5/5 and L. 5/5. Deep tendon reflexes are 2/4 at the knees and 2/4 at the ankles with strong extensor hallicus longus motor strength bilaterally. Sensory to both feet is intact to all sensory roots. Cardiovascular: The vascular exam is normal and is well perfused to distal extremities. Distal pulses DP/PT: R. 2+; L. 2+. There is cap refill noted less than two seconds in all digits. There is not edema of the bilateral lower extremities. There is not varicosities noted in the distal extremities. Lymph:  Upon palpation,  there is no lymphadenopathy noted in bilateral lower extremities. Musculoskeletal:  Gait: antalgic; examination of the nails and digits reveal no cyanosis or clubbing. Lumbar exam:  On visual inspection, there is not deformity of the spine. full range of motion, no tenderness, palpable spasm or pain on motion. Special tests: Straight Leg Raise negative, Mili test negative. Hip exam:   Upon inspection, there is not deformity noted. Upon palpation there is not tenderness. ROM: is  full and symmetrical.   Strength: Hip Flexors 5/5; Hip Abductors 5/5; Hip Adduction 5/5. Knee exam:  Left knee exam shows;  range of motion of R. Knee is 0 to 120, and L. Knee is 0 to 105. The patient does have  pain on motion, effusion is mild, there is tenderness over the  anterior region, there are not any masses, there is not ligamentous instability, there is not  deformity noted. Knee exam: neither positive for moderate crepitations, some mild tenderness laxity is not noted with  stress. There is not a popliteal cyst.     R. Knee:  Lachman's negative, Anterior Drawer negative, Posterior Drawer negative  Andre's negative, Thallasy  negative,   PF grind test negative, Apprehension test negative, Patellar J sign  negative  L. Knee:  Lachman's negative, Anterior Drawer negative, Posterior Drawer negative  Andre's negative, Thallasy  negative,   PF grind test positive, Apprehension test negative,  Patellar J sign  negative     MRI  Exam:  Cruciate and collateral ligaments are intact. No meniscal tear is identified. No acute bony abnormality is seen. Cartilage fissure at the medial patellar facet with subchondral reactive   marrow changes. No knee joint effusion. Radiographic findings reviewed with patient     Assessment:       Encounter Diagnoses   Name Primary? Sprain of left knee, unspecified ligament, initial encounter Yes         Plan:  Natural history and expected course discussed. Questions answered. Educational materials distributed. Rest, ice, compression, and elevation (RICE) therapy. Reduction in offending activity. I had a lengthy discussion with the patient regarding their diagnosis. I explained treatment options including surgical vs non surgical treatment. I reviewed in detail the risks and benefits and outlined the procedure in detail with expected outcomes and possible complications. I also discussed non surgical treatment such as injections (CSI and visco supplementation), physical therapy, topical creams and NSAID's. They have elected for conservative management at this time.    C9 for visco  celebrex

## 2023-07-17 RX ORDER — CELECOXIB 200 MG/1
CAPSULE ORAL
Qty: 30 CAPSULE | Refills: 3 | Status: SHIPPED | OUTPATIENT
Start: 2023-07-17

## 2023-09-27 ENCOUNTER — OFFICE VISIT (OUTPATIENT)
Dept: FAMILY MEDICINE CLINIC | Age: 48
End: 2023-09-27
Payer: COMMERCIAL

## 2023-09-27 VITALS
OXYGEN SATURATION: 99 % | WEIGHT: 129 LBS | RESPIRATION RATE: 18 BRPM | HEIGHT: 63 IN | SYSTOLIC BLOOD PRESSURE: 122 MMHG | DIASTOLIC BLOOD PRESSURE: 76 MMHG | HEART RATE: 85 BPM | BODY MASS INDEX: 22.86 KG/M2

## 2023-09-27 DIAGNOSIS — E78.2 MIXED HYPERLIPIDEMIA: ICD-10-CM

## 2023-09-27 DIAGNOSIS — E53.8 VITAMIN B 12 DEFICIENCY: ICD-10-CM

## 2023-09-27 DIAGNOSIS — R53.82 CHRONIC FATIGUE: ICD-10-CM

## 2023-09-27 DIAGNOSIS — R73.01 IFG (IMPAIRED FASTING GLUCOSE): Primary | ICD-10-CM

## 2023-09-27 DIAGNOSIS — R73.01 IFG (IMPAIRED FASTING GLUCOSE): ICD-10-CM

## 2023-09-27 DIAGNOSIS — M46.96 UNSPECIFIED INFLAMMATORY SPONDYLOPATHY, LUMBAR REGION (HCC): ICD-10-CM

## 2023-09-27 DIAGNOSIS — K63.5 POLYP OF SIGMOID COLON, UNSPECIFIED TYPE: ICD-10-CM

## 2023-09-27 DIAGNOSIS — K64.1 GRADE II HEMORRHOIDS: ICD-10-CM

## 2023-09-27 DIAGNOSIS — Z80.0 FAMILY HISTORY OF COLON CANCER: ICD-10-CM

## 2023-09-27 LAB
BASOPHILS ABSOLUTE: 0.12 K/UL (ref 0–0.2)
BASOPHILS RELATIVE PERCENT: 1 % (ref 0–2)
EOSINOPHILS ABSOLUTE: 0.37 K/UL (ref 0.05–0.5)
EOSINOPHILS RELATIVE PERCENT: 3 % (ref 0–6)
HCT VFR BLD CALC: 42.9 % (ref 34–48)
HEMOGLOBIN: 14.3 G/DL (ref 11.5–15.5)
LYMPHOCYTES ABSOLUTE: 3.9 K/UL (ref 1.5–4)
LYMPHOCYTES RELATIVE PERCENT: 29 % (ref 20–42)
MCH RBC QN AUTO: 30.6 PG (ref 26–35)
MCHC RBC AUTO-ENTMCNC: 33.3 G/DL (ref 32–34.5)
MCV RBC AUTO: 91.9 FL (ref 80–99.9)
MONOCYTES ABSOLUTE: 0.37 K/UL (ref 0.1–0.95)
MONOCYTES RELATIVE PERCENT: 3 % (ref 2–12)
NEUTROPHILS ABSOLUTE: 8.54 K/UL (ref 1.8–7.3)
NEUTROPHILS RELATIVE PERCENT: 64 % (ref 43–80)
PDW BLD-RTO: 13.2 % (ref 11.5–15)
PLATELET # BLD: 397 K/UL (ref 130–450)
PMV BLD AUTO: 9.3 FL (ref 7–12)
RBC # BLD: 4.67 M/UL (ref 3.5–5.5)
RBC # BLD: ABNORMAL 10*6/UL
WBC # BLD: 13.3 K/UL (ref 4.5–11.5)

## 2023-09-27 PROCEDURE — 99214 OFFICE O/P EST MOD 30 MIN: CPT | Performed by: FAMILY MEDICINE

## 2023-09-27 PROCEDURE — G8420 CALC BMI NORM PARAMETERS: HCPCS | Performed by: FAMILY MEDICINE

## 2023-09-27 PROCEDURE — 4004F PT TOBACCO SCREEN RCVD TLK: CPT | Performed by: FAMILY MEDICINE

## 2023-09-27 PROCEDURE — G8427 DOCREV CUR MEDS BY ELIG CLIN: HCPCS | Performed by: FAMILY MEDICINE

## 2023-09-27 RX ORDER — HYDROCORTISONE ACETATE 25 MG/1
25 SUPPOSITORY RECTAL EVERY 12 HOURS
Qty: 28 SUPPOSITORY | Refills: 0 | Status: SHIPPED | OUTPATIENT
Start: 2023-09-27 | End: 2023-10-11

## 2023-09-27 SDOH — ECONOMIC STABILITY: FOOD INSECURITY: WITHIN THE PAST 12 MONTHS, THE FOOD YOU BOUGHT JUST DIDN'T LAST AND YOU DIDN'T HAVE MONEY TO GET MORE.: PATIENT DECLINED

## 2023-09-27 SDOH — ECONOMIC STABILITY: HOUSING INSECURITY
IN THE LAST 12 MONTHS, WAS THERE A TIME WHEN YOU DID NOT HAVE A STEADY PLACE TO SLEEP OR SLEPT IN A SHELTER (INCLUDING NOW)?: PATIENT REFUSED

## 2023-09-27 SDOH — ECONOMIC STABILITY: FOOD INSECURITY: WITHIN THE PAST 12 MONTHS, YOU WORRIED THAT YOUR FOOD WOULD RUN OUT BEFORE YOU GOT MONEY TO BUY MORE.: PATIENT DECLINED

## 2023-09-27 SDOH — ECONOMIC STABILITY: INCOME INSECURITY: HOW HARD IS IT FOR YOU TO PAY FOR THE VERY BASICS LIKE FOOD, HOUSING, MEDICAL CARE, AND HEATING?: PATIENT DECLINED

## 2023-09-27 ASSESSMENT — PATIENT HEALTH QUESTIONNAIRE - PHQ9
SUM OF ALL RESPONSES TO PHQ QUESTIONS 1-9: 0
SUM OF ALL RESPONSES TO PHQ QUESTIONS 1-9: 0
1. LITTLE INTEREST OR PLEASURE IN DOING THINGS: 0
SUM OF ALL RESPONSES TO PHQ QUESTIONS 1-9: 0
SUM OF ALL RESPONSES TO PHQ9 QUESTIONS 1 & 2: 0
SUM OF ALL RESPONSES TO PHQ QUESTIONS 1-9: 0
2. FEELING DOWN, DEPRESSED OR HOPELESS: 0

## 2023-09-28 LAB
ALBUMIN SERPL-MCNC: 4.3 G/DL (ref 3.5–5.2)
ALP BLD-CCNC: 83 U/L (ref 35–104)
ALT SERPL-CCNC: 10 U/L (ref 0–32)
ANION GAP SERPL CALCULATED.3IONS-SCNC: 14 MMOL/L (ref 7–16)
AST SERPL-CCNC: 16 U/L (ref 0–31)
BILIRUB SERPL-MCNC: <0.2 MG/DL (ref 0–1.2)
BUN BLDV-MCNC: 14 MG/DL (ref 6–20)
CALCIUM SERPL-MCNC: 9.2 MG/DL (ref 8.6–10.2)
CHLORIDE BLD-SCNC: 104 MMOL/L (ref 98–107)
CHOLESTEROL: 208 MG/DL
CO2: 21 MMOL/L (ref 22–29)
CREAT SERPL-MCNC: 0.9 MG/DL (ref 0.5–1)
FOLATE: 10.6 NG/ML (ref 4.8–24.2)
GFR SERPL CREATININE-BSD FRML MDRD: >60 ML/MIN/1.73M2
GLUCOSE BLD-MCNC: 80 MG/DL (ref 74–99)
HBA1C MFR BLD: 5.3 % (ref 4–5.6)
HDLC SERPL-MCNC: 41 MG/DL
LDL CHOLESTEROL: 119 MG/DL
POTASSIUM SERPL-SCNC: 4.3 MMOL/L (ref 3.5–5)
SODIUM BLD-SCNC: 139 MMOL/L (ref 132–146)
T4 FREE: 1.1 NG/DL (ref 0.9–1.7)
TOTAL PROTEIN: 7 G/DL (ref 6.4–8.3)
TRIGL SERPL-MCNC: 241 MG/DL
TSH SERPL DL<=0.05 MIU/L-ACNC: 1.45 UIU/ML (ref 0.27–4.2)
VITAMIN B-12: 400 PG/ML (ref 211–946)
VLDLC SERPL CALC-MCNC: 48 MG/DL

## 2023-10-03 ASSESSMENT — ENCOUNTER SYMPTOMS
COUGH: 0
STRIDOR: 0
SINUS PRESSURE: 0
FACIAL SWELLING: 0
VOICE CHANGE: 0
BLOOD IN STOOL: 0
ABDOMINAL DISTENTION: 0
CHEST TIGHTNESS: 0
EYE PAIN: 0
PHOTOPHOBIA: 0
BACK PAIN: 0
RHINORRHEA: 0
CHOKING: 0
APNEA: 0
RECTAL PAIN: 0
TROUBLE SWALLOWING: 0
EYE REDNESS: 0
EYE DISCHARGE: 0
NAUSEA: 0
EYE ITCHING: 0
WHEEZING: 0
COLOR CHANGE: 0
VOMITING: 0
SORE THROAT: 0
ANAL BLEEDING: 0
DIARRHEA: 0
ABDOMINAL PAIN: 0
CONSTIPATION: 0
SHORTNESS OF BREATH: 0

## 2023-10-03 NOTE — PROGRESS NOTES
Psychiatric/Behavioral:  Negative for agitation, behavioral problems, confusion, decreased concentration, dysphoric mood, hallucinations, self-injury, sleep disturbance and suicidal ideas. The patient is not nervous/anxious and is not hyperactive. Past Medical History:   Diagnosis Date    Bulging lumbar disc     Chronic back pain     Compression of nerve     Headache(784.0)     Insomnia     Tachycardia     takes metoprolol for irreg HR        Social History     Socioeconomic History    Marital status:      Spouse name: Not on file    Number of children: Not on file    Years of education: Not on file    Highest education level: Not on file   Occupational History    Not on file   Tobacco Use    Smoking status: Every Day     Packs/day: 0.50     Years: 15.00     Additional pack years: 0.00     Total pack years: 7.50     Types: Cigarettes    Smokeless tobacco: Never   Vaping Use    Vaping Use: Never used   Substance and Sexual Activity    Alcohol use: Not Currently     Comment: infrequent social wine    Drug use: No    Sexual activity: Not Currently     Partners: Male     Birth control/protection: Injection   Other Topics Concern    Not on file   Social History Narrative    Not on file     Social Determinants of Health     Financial Resource Strain: Unknown (9/27/2023)    Overall Financial Resource Strain (CARDIA)     Difficulty of Paying Living Expenses: Patient refused   Food Insecurity: Unknown (9/27/2023)    Hunger Vital Sign     Worried About Running Out of Food in the Last Year: Patient refused     [address] in the Last Year: Patient refused   Transportation Needs: Unknown (9/27/2023)    PRAPARE - Transportation     Lack of Transportation (Medical):  Not on file     Lack of Transportation (Non-Medical): Patient refused   Physical Activity: Not on file   Stress: Not on file   Social Connections: Not on file   Intimate Partner Violence: Not on file   Housing Stability: Unknown (9/27/2023)

## 2023-10-07 DIAGNOSIS — D72.829 LEUKOCYTOSIS, UNSPECIFIED TYPE: Primary | ICD-10-CM

## 2023-10-31 DIAGNOSIS — D72.829 LEUKOCYTOSIS, UNSPECIFIED TYPE: ICD-10-CM

## 2023-10-31 LAB
ABSOLUTE IMMATURE GRANULOCYTE: 0.05 K/UL (ref 0–0.58)
BASOPHILS ABSOLUTE: 0.1 K/UL (ref 0–0.2)
BASOPHILS RELATIVE PERCENT: 1 % (ref 0–2)
EOSINOPHILS ABSOLUTE: 0.34 K/UL (ref 0.05–0.5)
EOSINOPHILS RELATIVE PERCENT: 3 % (ref 0–6)
HCT VFR BLD CALC: 40.1 % (ref 34–48)
HEMOGLOBIN: 13.5 G/DL (ref 11.5–15.5)
IMMATURE GRANULOCYTES: 0 % (ref 0–5)
LYMPHOCYTES ABSOLUTE: 4.64 K/UL (ref 1.5–4)
LYMPHOCYTES RELATIVE PERCENT: 39 % (ref 20–42)
MCH RBC QN AUTO: 30.5 PG (ref 26–35)
MCHC RBC AUTO-ENTMCNC: 33.7 G/DL (ref 32–34.5)
MCV RBC AUTO: 90.5 FL (ref 80–99.9)
MONOCYTES ABSOLUTE: 0.72 K/UL (ref 0.1–0.95)
MONOCYTES RELATIVE PERCENT: 6 % (ref 2–12)
NEUTROPHILS ABSOLUTE: 6.14 K/UL (ref 1.8–7.3)
NEUTROPHILS RELATIVE PERCENT: 51 % (ref 43–80)
PDW BLD-RTO: 12.5 % (ref 11.5–15)
PLATELET # BLD: 377 K/UL (ref 130–450)
PMV BLD AUTO: 9.2 FL (ref 7–12)
RBC # BLD: 4.43 M/UL (ref 3.5–5.5)
RBC # BLD: ABNORMAL 10*6/UL
WBC # BLD: 12 K/UL (ref 4.5–11.5)

## 2023-11-01 LAB — PATHOLOGIST REVIEW: NORMAL

## 2023-11-02 DIAGNOSIS — D72.829 LEUKOCYTOSIS, UNSPECIFIED TYPE: Primary | ICD-10-CM

## 2023-12-18 ENCOUNTER — HOSPITAL ENCOUNTER (OUTPATIENT)
Dept: INFUSION THERAPY | Age: 48
Discharge: HOME | End: 2023-12-18
Payer: COMMERCIAL

## 2023-12-18 DIAGNOSIS — D72.828 OTHER ELEVATED WHITE BLOOD CELL (WBC) COUNT: ICD-10-CM

## 2023-12-18 LAB
BASOPHILS # BLD: 0.07 K/UL (ref 0–0.2)
BASOPHILS NFR BLD: 1 % (ref 0–2)
EOSINOPHIL # BLD: 0.27 K/UL (ref 0.05–0.5)
EOSINOPHILS RELATIVE PERCENT: 3 % (ref 0–6)
ERYTHROCYTE [DISTWIDTH] IN BLOOD BY AUTOMATED COUNT: 13.1 % (ref 11.5–15)
HCT VFR BLD AUTO: 40.3 % (ref 34–48)
HGB BLD-MCNC: 14.3 G/DL (ref 11.5–15.5)
IMM GRANULOCYTES # BLD AUTO: 0.05 K/UL (ref 0–0.58)
IMM GRANULOCYTES NFR BLD: 1 % (ref 0–5)
LYMPHOCYTES NFR BLD: 3 K/UL (ref 1.5–4)
LYMPHOCYTES RELATIVE PERCENT: 28 % (ref 20–42)
MCH RBC QN AUTO: 30.5 PG (ref 26–35)
MCHC RBC AUTO-ENTMCNC: 35.5 G/DL (ref 32–34.5)
MCV RBC AUTO: 85.9 FL (ref 80–99.9)
MONOCYTES NFR BLD: 0.55 K/UL (ref 0.1–0.95)
MONOCYTES NFR BLD: 5 % (ref 2–12)
NEUTROPHILS NFR BLD: 64 % (ref 43–80)
NEUTS SEG NFR BLD: 6.97 K/UL (ref 1.8–7.3)
PLATELET # BLD AUTO: 330 K/UL (ref 130–450)
PMV BLD AUTO: 8.9 FL (ref 7–12)
RBC # BLD AUTO: 4.69 M/UL (ref 3.5–5.5)
WBC OTHER # BLD: 10.9 K/UL (ref 4.5–11.5)

## 2023-12-18 PROCEDURE — 86039 ANTINUCLEAR ANTIBODIES (ANA): CPT

## 2023-12-18 PROCEDURE — 85025 COMPLETE CBC W/AUTO DIFF WBC: CPT

## 2023-12-18 PROCEDURE — 36415 COLL VENOUS BLD VENIPUNCTURE: CPT

## 2023-12-18 PROCEDURE — 86038 ANTINUCLEAR ANTIBODIES: CPT

## 2023-12-19 LAB
PATH REV BLD -IMP: NORMAL
SEND OUT REPORT: NORMAL
TEST NAME: NORMAL

## 2023-12-20 LAB
ANA PAT SER IF-IMP: ABNORMAL
ANA SER QL IA: POSITIVE
ANA TITER: >=640

## 2023-12-22 LAB
SEND OUT REPORT: NORMAL
TEST NAME: NORMAL

## 2023-12-24 LAB
SEND OUT REPORT: NORMAL
TEST NAME: NORMAL

## 2024-01-25 ENCOUNTER — OFFICE VISIT (OUTPATIENT)
Dept: ONCOLOGY | Age: 49
End: 2024-01-25
Payer: COMMERCIAL

## 2024-01-25 VITALS
BODY MASS INDEX: 23.71 KG/M2 | SYSTOLIC BLOOD PRESSURE: 133 MMHG | TEMPERATURE: 97.5 F | HEART RATE: 83 BPM | HEIGHT: 63 IN | WEIGHT: 133.8 LBS | OXYGEN SATURATION: 100 % | DIASTOLIC BLOOD PRESSURE: 79 MMHG

## 2024-01-25 DIAGNOSIS — D72.828 OTHER ELEVATED WHITE BLOOD CELL (WBC) COUNT: ICD-10-CM

## 2024-01-25 DIAGNOSIS — R76.8 POSITIVE ANA (ANTINUCLEAR ANTIBODY): Primary | ICD-10-CM

## 2024-01-25 PROCEDURE — 99214 OFFICE O/P EST MOD 30 MIN: CPT | Performed by: INTERNAL MEDICINE

## 2024-01-25 PROCEDURE — 99213 OFFICE O/P EST LOW 20 MIN: CPT

## 2024-01-25 RX ORDER — MULTIVIT-MIN/IRON/FOLIC ACID/K 18-600-40
CAPSULE ORAL
COMMUNITY

## 2024-01-25 NOTE — PROGRESS NOTES
[facility]  [address] [address]  Dept: [phone]  Loc: [phone]  Attending progress note      Reason for Visit:   Leukocytosis.    Referring Physician:  Jeronimo Cordova DO    PCP:  Jeronimo Cordova DO    History of Present Illness:      Ms. Corbett is a pleasant 48-year-old lady, with a past medical history significant for chronic back pain, who was referred to the hematology office for evaluation of leukocytosis.  On 9/27/2023 her white count was 13.3, ANC 8540, ALC 3900, normal hemoglobin, hematocrit and platelet count, CBC was repeated on 10/31/2023, white count was 12, ANC had normalized 6140, ALT was 4640, and 2012 white count was 13.2, the patient had had intermittent leukocytosis since 2012.  The patient reports recurrent infections about every 3 months, respiratory tract infections, sinusitis and ear infections.  She feels the infections are lasting longer.  The patient has been feeling tired, and has joints pain.    Review of Systems;  CONSTITUTIONAL: No fever, chills.  Decreased appetite, positive for weight loss  ENMT: Eyes: No diplopia; Nose: No epistaxis. Mouth: No sore throat.  RESPIRATORY: No hemoptysis, shortness of breath, cough.   CARDIOVASCULAR: No chest pain, palpitations.  GASTROINTESTINAL: No nausea/vomiting, abdominal pain, diarrhea/constipation.  GENITOURINARY: No dysuria, urinary frequency, hematuria.  NEURO: No syncope, presyncope, headache.  Remainder:  ROS NEGATIVE    Past Medical History:      Diagnosis Date    Bulging lumbar disc     Chronic back pain     Compression of nerve     Headache(784.0)     Insomnia     Tachycardia     takes metoprolol for irreg HR      Patient Active Problem List   Diagnosis    Shoulder bursitis    Impingement syndrome of shoulder    Shoulder pain    Biceps tendonitis    Worsening headaches    Back pain    Chronic migraine    Protruded lumbar disc    DDD (degenerative

## 2024-03-20 DIAGNOSIS — D72.828 OTHER ELEVATED WHITE BLOOD CELL (WBC) COUNT: Primary | ICD-10-CM

## 2024-03-20 NOTE — PROGRESS NOTES
Spoke with Dr. Kraig Mathis regarding the pt. Requesting a new referral for rheumatology due to the pt. Not being able to be seen for several months.  New referral to [facility] placed.  Notified CHAUNCEY Moreno at [facility] - she will notify the pt.    Electronically signed by Felecia Stewart RN on 3/20/2024 at 12:06 PM

## 2024-04-25 ENCOUNTER — OFFICE VISIT (OUTPATIENT)
Dept: ONCOLOGY | Age: 49
End: 2024-04-25
Payer: COMMERCIAL

## 2024-04-25 ENCOUNTER — HOSPITAL ENCOUNTER (OUTPATIENT)
Dept: INFUSION THERAPY | Age: 49
Discharge: HOME | End: 2024-04-25
Payer: COMMERCIAL

## 2024-04-25 VITALS
HEART RATE: 78 BPM | DIASTOLIC BLOOD PRESSURE: 84 MMHG | TEMPERATURE: 98.7 F | BODY MASS INDEX: 24.38 KG/M2 | SYSTOLIC BLOOD PRESSURE: 127 MMHG | HEIGHT: 63 IN | OXYGEN SATURATION: 99 % | WEIGHT: 137.6 LBS

## 2024-04-25 DIAGNOSIS — R76.8 POSITIVE ANA (ANTINUCLEAR ANTIBODY): ICD-10-CM

## 2024-04-25 DIAGNOSIS — R76.8 POSITIVE ANA (ANTINUCLEAR ANTIBODY): Primary | ICD-10-CM

## 2024-04-25 DIAGNOSIS — D72.828 OTHER ELEVATED WHITE BLOOD CELL (WBC) COUNT: ICD-10-CM

## 2024-04-25 LAB
BASOPHILS # BLD: 0.09 K/UL (ref 0–0.2)
BASOPHILS NFR BLD: 1 % (ref 0–2)
EOSINOPHIL # BLD: 0.34 K/UL (ref 0.05–0.5)
EOSINOPHILS RELATIVE PERCENT: 4 % (ref 0–6)
ERYTHROCYTE [DISTWIDTH] IN BLOOD BY AUTOMATED COUNT: 12.9 % (ref 11.5–15)
HCT VFR BLD AUTO: 42.3 % (ref 34–48)
HGB BLD-MCNC: 14.6 G/DL (ref 11.5–15.5)
IMM GRANULOCYTES # BLD AUTO: 0.04 K/UL (ref 0–0.58)
IMM GRANULOCYTES NFR BLD: 0 % (ref 0–5)
LYMPHOCYTES NFR BLD: 2.54 K/UL (ref 1.5–4)
LYMPHOCYTES RELATIVE PERCENT: 27 % (ref 20–42)
MCH RBC QN AUTO: 30.8 PG (ref 26–35)
MCHC RBC AUTO-ENTMCNC: 34.5 G/DL (ref 32–34.5)
MCV RBC AUTO: 89.2 FL (ref 80–99.9)
MONOCYTES NFR BLD: 0.57 K/UL (ref 0.1–0.95)
MONOCYTES NFR BLD: 6 % (ref 2–12)
NEUTROPHILS NFR BLD: 62 % (ref 43–80)
NEUTS SEG NFR BLD: 5.75 K/UL (ref 1.8–7.3)
PLATELET # BLD AUTO: 368 K/UL (ref 130–450)
PMV BLD AUTO: 8.4 FL (ref 7–12)
RBC # BLD AUTO: 4.74 M/UL (ref 3.5–5.5)
WBC OTHER # BLD: 9.3 K/UL (ref 4.5–11.5)

## 2024-04-25 PROCEDURE — 99213 OFFICE O/P EST LOW 20 MIN: CPT | Performed by: INTERNAL MEDICINE

## 2024-04-25 PROCEDURE — 99212 OFFICE O/P EST SF 10 MIN: CPT

## 2024-04-25 PROCEDURE — 85025 COMPLETE CBC W/AUTO DIFF WBC: CPT

## 2024-04-25 PROCEDURE — 36415 COLL VENOUS BLD VENIPUNCTURE: CPT

## 2024-04-25 NOTE — PROGRESS NOTES
Overall Financial Resource Strain (CARDIA)     Difficulty of Paying Living Expenses: Patient declined   Food Insecurity: Not on file (9/27/2023)   Transportation Needs: Unknown (9/27/2023)    PRAPARE - Transportation     Lack of Transportation (Medical): Not on file     Lack of Transportation (Non-Medical): Patient declined   Physical Activity: Not on file   Stress: Not on file   Social Connections: Not on file   Intimate Partner Violence: Not on file   Housing Stability: Unknown (9/27/2023)    Housing Stability Vital Sign     Unable to Pay for Housing in the Last Year: Not on file     Number of Places Lived in the Last Year: Not on file     Unstable Housing in the Last Year: Patient refused       Allergies:  No Known Allergies    Physical Exam:  /84   Pulse 78   Temp 98.7 °F (37.1 °C)   Ht 1.6 m (5' 3\")   Wt 62.4 kg (137 lb 9.6 oz)   SpO2 99%   BMI 24.37 kg/m²   GENERAL: Alert, oriented x 3, not in acute distress.  HEENT: PERRLA; EOMI. Oropharynx clear.   NECK: Supple. No palpable cervical or supraclavicular lymphadenopathy.   LUNGS: Good air entry bilaterally. No wheezing, crackles or rhonchi.   CARDIOVASCULAR: Regular rhythm, normal rate.  ABDOMEN: Soft. Non-tender, non-distended. Positive bowel sounds.  EXTREMITIES: Without clubbing, cyanosis, or edema.   NEUROLOGIC: No focal deficits.     ECOG PS 0    Impression/Plan:       Ms. Corbett is a pleasant 48-year-old lady, with a past medical history significant for chronic back pain, who was referred to the hematology office for evaluation of leukocytosis.  On 9/27/2023 her white count was 13.3, ANC 8540, ALC 3900, normal hemoglobin, hematocrit and platelet count, CBC was repeated on 10/31/2023, white count was 12, ANC had normalized 6140, ALT was 4640, and 2012 white count was 13.2, the patient had had intermittent leukocytosis since 2012.  The patient reports recurrent infections about every 3 months, respiratory tract infections, sinusitis and ear

## 2024-05-25 ENCOUNTER — APPOINTMENT (OUTPATIENT)
Dept: GENERAL RADIOLOGY | Age: 49
End: 2024-05-25
Payer: COMMERCIAL

## 2024-05-25 ENCOUNTER — HOSPITAL ENCOUNTER (EMERGENCY)
Age: 49
Discharge: HOME | End: 2024-05-25
Payer: COMMERCIAL

## 2024-05-25 VITALS
BODY MASS INDEX: 24.45 KG/M2 | SYSTOLIC BLOOD PRESSURE: 144 MMHG | DIASTOLIC BLOOD PRESSURE: 84 MMHG | RESPIRATION RATE: 16 BRPM | HEART RATE: 82 BPM | TEMPERATURE: 98.6 F | OXYGEN SATURATION: 100 % | WEIGHT: 138 LBS

## 2024-05-25 DIAGNOSIS — M25.519 ACUTE SHOULDER PAIN, UNSPECIFIED LATERALITY: Primary | ICD-10-CM

## 2024-05-25 PROCEDURE — 99211 OFF/OP EST MAY X REQ PHY/QHP: CPT

## 2024-05-25 PROCEDURE — 73030 X-RAY EXAM OF SHOULDER: CPT

## 2024-05-25 RX ORDER — NAPROXEN 500 MG/1
500 TABLET ORAL 2 TIMES DAILY PRN
Qty: 14 TABLET | Refills: 0 | Status: SHIPPED | OUTPATIENT
Start: 2024-05-25 | End: 2024-06-01

## 2024-05-25 ASSESSMENT — PAIN DESCRIPTION - ORIENTATION: ORIENTATION: LEFT

## 2024-05-25 ASSESSMENT — PAIN - FUNCTIONAL ASSESSMENT: PAIN_FUNCTIONAL_ASSESSMENT: 0-10

## 2024-05-25 ASSESSMENT — PAIN DESCRIPTION - LOCATION: LOCATION: SHOULDER

## 2024-05-25 ASSESSMENT — PAIN SCALES - GENERAL: PAINLEVEL_OUTOF10: 8

## 2024-05-25 ASSESSMENT — PAIN DESCRIPTION - DESCRIPTORS: DESCRIPTORS: ACHING;DISCOMFORT

## 2024-05-25 NOTE — ED PROVIDER NOTES
Department of Emergency Medicine  [facility]  Provider Note  Admit Date/Time: 2024  9:22 AM  Room:   NAME: Cb Corbett  : 1975  MRN: [id number]     Chief Complaint:  Shoulder Injury (Injured left shoulder 3 weeks ago and it is no better )    History of Present Illness        Cb Corbett is a 48 y.o. female who has a past medical history of:   Past Medical History:   Diagnosis Date    Bulging lumbar disc     Chronic back pain     Compression of nerve     Headache(784.0)     Insomnia     Tachycardia     takes metoprolol for irreg HR     presents to the urgent care center by private car for a problem with her left shoulder.  She says she injured it 3 weeks ago when she was trying to lift something that was very heavy.  And she said now the shoulder hurts all the time she cannot get it in a comfortable position and she said movement is also very painful.  She said she has a history of a rotator cuff problem on the right does not have any numbness or tingling in the arm is not complaining of any neck pain.  ROS    Pertinent positives and negatives are stated within HPI, all other systems reviewed and are negative.    Past Surgical History:   Procedure Laterality Date    BICEPS TENDON REPAIR Right     BREAST ENHANCEMENT SURGERY Bilateral     BREAST SURGERY  augmentation    COSMETIC SURGERY      HEMORROIDECTOMY N/A 2019    EXAM UNDER ANESTHESIA HEMORRHOIDECTOMY performed by Gonzalo Amezquita MD at [facility] OR    KNEE ARTHROSCOPY  right    MEDICATION INJECTION Left 2021    LEFT SACROILIAC JOINT INJECTION UNDER FLUOROSCOPY performed by Corey Dolan MD at [facility] OR    NERVE BLOCK Right 10/12/2015    lumbar right transforaminal nerve block #1 l4-5 l5-s1    NERVE BLOCK Right 10/22/2015    Transforaminal lumbar nerve block #2    NERVE BLOCK Right 2015    transforaminal nerve block right lumbar #3    NERVE BLOCK  2015    sacroiliac joint right #1     her Naprosyn for pain and advised her to follow-up with Ortho    Assessment      1. Acute shoulder pain, unspecified laterality      Plan   Discharge to home and advised to contact José Antonio Lopez DO  [address] [address] [phone]    Schedule an appointment as soon as possible for a visit      Patient condition is good    New Medications     Discharge Medication List as of 5/25/2024 10:13 AM        START taking these medications    Details   naproxen (NAPROSYN) 500 MG tablet Take 1 tablet by mouth 2 times daily as needed for Pain, Disp-14 tablet, R-0Normal           Electronically signed by DIANN Ramos CNP   DD: 5/25/24  **This report was transcribed using voice recognition software. Every effort was made to ensure accuracy; however, inadvertent computerized transcription errors may be present.  END OF ED PROVIDER NOTE       Sneha Rascon APRN - CNP  05/25/24 1012       Sneha Rascon APRN - CNP  05/25/24 4189

## 2024-06-12 LAB
SEND OUT REPORT: NORMAL
TEST NAME: NORMAL

## 2024-08-07 ENCOUNTER — OFFICE VISIT (OUTPATIENT)
Dept: RHEUMATOLOGY | Age: 49
End: 2024-08-07
Payer: COMMERCIAL

## 2024-08-07 VITALS — BODY MASS INDEX: 23.92 KG/M2 | WEIGHT: 135 LBS | HEIGHT: 63 IN

## 2024-08-07 DIAGNOSIS — R76.8 POSITIVE ANA (ANTINUCLEAR ANTIBODY): Primary | ICD-10-CM

## 2024-08-07 DIAGNOSIS — M13.0 POLYARTHRITIS: ICD-10-CM

## 2024-08-07 PROCEDURE — 99204 OFFICE O/P NEW MOD 45 MIN: CPT | Performed by: INTERNAL MEDICINE

## 2024-08-07 ASSESSMENT — ENCOUNTER SYMPTOMS
COUGH: 0
DIARRHEA: 0
COLOR CHANGE: 1
NAUSEA: 0
BACK PAIN: 1
VOMITING: 0
TROUBLE SWALLOWING: 0
SHORTNESS OF BREATH: 1
ABDOMINAL PAIN: 0

## 2024-08-07 NOTE — PROGRESS NOTES
Cb Corbett 1975 is a 48 y.o. female, here for evaluation of the following chief complaint(s):  New Patient (Cb is here today as a new patient for positive LILI)      Assessment & Plan   ASSESSMENT/PLAN:    Cb Corbett 1975 is a 48 y.o. female seen in consult for positive LILI.    1.  Positive LILI-she has a titer of 1: 640 which is a significantly elevated titer.  She has some suspicious but nonspecific features such as photosensitivity Raynaud's, arthralgias without synovitis.  These do raise some suspicion but I would not label her with any systemic connective tissue disease today.  LILI is in a centromere pattern which can be associated with limited scleroderma but she has no sclerodactyly, telangiectasias, calcinosis.  At this point will need further workup as below.  Did discuss the possibility of Plaquenil but she would need to clear that with her retina specialist if we do decide to go that route.    2.  Polyarthritis-she has a lot of known degenerative changes in the spine.  All of her joint pain may be degenerative and she may have some component of fibromyalgia given that she states she has days where even her toenails hurt.  She has some tenderness in the right wrist and the knees but again I see no synovitis on exam today.  Similarly I would not label her with an inflammatory arthritis at this point but will need further workup as below.    1. Positive LILI (antinuclear antibody)  -     MISCELLANEOUS SENDOUT 14.3.3 eta; Future  -     XR HAND LEFT (MIN 3 VIEWS); Future  -     XR HAND RIGHT (MIN 3 VIEWS); Future  -     XR FOOT LEFT (MIN 3 VIEWS); Future  -     XR FOOT RIGHT (MIN 3 VIEWS); Future  -     C3 Complement; Future  -     C4 Complement; Future  -     CBC with Auto Differential; Future  -     Comprehensive Metabolic Panel; Future  -     Anti-DNA Antibody, Double-Stranded; Future  -     Urinalysis; Future  -     GISELA Profile; Future  -     C-Reactive Protein; Future  -

## 2024-08-07 NOTE — PATIENT INSTRUCTIONS
You have some suspicious features but would not label you with any underlying systemic autoimmune disease at this point but will need further workup

## 2024-08-08 ENCOUNTER — TELEPHONE (OUTPATIENT)
Dept: RHEUMATOLOGY | Age: 49
End: 2024-08-08

## 2024-08-08 NOTE — TELEPHONE ENCOUNTER
[email or web address] message sent to patient. Waiting for response.    Electronically signed by Alba Fonseca CMA on 8/8/2024 at 1:10 PM

## 2024-08-08 NOTE — TELEPHONE ENCOUNTER
Patient had her blood work completed at [facility] on 08/07/2024.    Results have been updated through Care Everywhere.    Please review and advise further instructions.      Electronically signed by Alba Fonseca CMA on 8/8/2024 at 10:27 AM

## 2024-08-08 NOTE — TELEPHONE ENCOUNTER
Patient received [employer] message on results.  Last read by Cb Corbett at  2:34 PM on 8/8/2024.    Electronically signed by Alba Fonseca CMA on 8/8/2024 at 2:46 PM

## 2024-08-14 ENCOUNTER — TELEPHONE (OUTPATIENT)
Dept: RHEUMATOLOGY | Age: 49
End: 2024-08-14

## 2024-08-14 NOTE — TELEPHONE ENCOUNTER
Patient had her x-rays done at [facility] on 08/13/2024.    Care Everywhere updated to review reports.    Please review and advise further instructions.      Electronically signed by Alba Fonseca CMA on 8/14/2024 at 9:24 AM

## 2024-10-16 ENCOUNTER — PATIENT MESSAGE (OUTPATIENT)
Dept: RHEUMATOLOGY | Age: 49
End: 2024-10-16

## 2024-10-16 RX ORDER — HYDROXYCHLOROQUINE SULFATE 200 MG/1
300 TABLET, FILM COATED ORAL DAILY
Qty: 45 TABLET | Refills: 5 | Status: SHIPPED | OUTPATIENT
Start: 2024-10-16

## 2024-10-16 NOTE — TELEPHONE ENCOUNTER
Retinal OV note is scanned in chart/media.    Please advise.      Electronically signed by Alba Fonseca CMA on 10/16/2024 at 7:47 AM

## 2024-10-23 ENCOUNTER — HOSPITAL ENCOUNTER (OUTPATIENT)
Dept: INFUSION THERAPY | Age: 49
Discharge: HOME | End: 2024-10-23
Payer: COMMERCIAL

## 2024-10-23 DIAGNOSIS — D72.828 OTHER ELEVATED WHITE BLOOD CELL (WBC) COUNT: Primary | ICD-10-CM

## 2024-10-23 LAB
BASOPHILS # BLD: 0.11 K/UL (ref 0–0.2)
BASOPHILS NFR BLD: 1 % (ref 0–2)
EOSINOPHIL # BLD: 0.47 K/UL (ref 0.05–0.5)
EOSINOPHILS RELATIVE PERCENT: 4 % (ref 0–6)
ERYTHROCYTE [DISTWIDTH] IN BLOOD BY AUTOMATED COUNT: 13 % (ref 11.5–15)
HCT VFR BLD AUTO: 37.6 % (ref 34–48)
HGB BLD-MCNC: 13.2 G/DL (ref 11.5–15.5)
IMM GRANULOCYTES # BLD AUTO: 0.06 K/UL (ref 0–0.58)
IMM GRANULOCYTES NFR BLD: 1 % (ref 0–5)
LYMPHOCYTES NFR BLD: 4.45 K/UL (ref 1.5–4)
LYMPHOCYTES RELATIVE PERCENT: 36 % (ref 20–42)
MCH RBC QN AUTO: 31.5 PG (ref 26–35)
MCHC RBC AUTO-ENTMCNC: 35.1 G/DL (ref 32–34.5)
MCV RBC AUTO: 89.7 FL (ref 80–99.9)
MONOCYTES NFR BLD: 0.74 K/UL (ref 0.1–0.95)
MONOCYTES NFR BLD: 6 % (ref 2–12)
NEUTROPHILS NFR BLD: 52 % (ref 43–80)
NEUTS SEG NFR BLD: 6.41 K/UL (ref 1.8–7.3)
PLATELET # BLD AUTO: 379 K/UL (ref 130–450)
PMV BLD AUTO: 8.6 FL (ref 7–12)
RBC # BLD AUTO: 4.19 M/UL (ref 3.5–5.5)
WBC OTHER # BLD: 12.2 K/UL (ref 4.5–11.5)

## 2024-10-23 PROCEDURE — 36415 COLL VENOUS BLD VENIPUNCTURE: CPT

## 2024-10-23 PROCEDURE — 85025 COMPLETE CBC W/AUTO DIFF WBC: CPT

## 2024-10-24 ENCOUNTER — OFFICE VISIT (OUTPATIENT)
Dept: ONCOLOGY | Age: 49
End: 2024-10-24
Payer: COMMERCIAL

## 2024-10-24 VITALS
TEMPERATURE: 97.8 F | SYSTOLIC BLOOD PRESSURE: 136 MMHG | DIASTOLIC BLOOD PRESSURE: 80 MMHG | WEIGHT: 137.8 LBS | OXYGEN SATURATION: 100 % | HEART RATE: 86 BPM | BODY MASS INDEX: 24.41 KG/M2 | HEIGHT: 63 IN

## 2024-10-24 DIAGNOSIS — R76.8 POSITIVE ANA (ANTINUCLEAR ANTIBODY): ICD-10-CM

## 2024-10-24 DIAGNOSIS — R21 SKIN RASH: Primary | ICD-10-CM

## 2024-10-24 DIAGNOSIS — D72.828 OTHER ELEVATED WHITE BLOOD CELL (WBC) COUNT: ICD-10-CM

## 2024-10-24 PROCEDURE — 99214 OFFICE O/P EST MOD 30 MIN: CPT | Performed by: INTERNAL MEDICINE

## 2024-10-24 NOTE — PROGRESS NOTES
[facility]  [address] [address]  Dept: [phone]  Loc: [phone]  Attending progress note      Reason for Visit:   Leukocytosis.    Referring Physician:  Jeronimo Cordova DO    PCP:  Jeronimo Cordova DO    History of Present Illness:      Ms. Corbett is a pleasant 49-year-old lady, with a past medical history significant for chronic back pain, who was referred to the hematology office for evaluation of leukocytosis.  On 9/27/2023 her white count was 13.3, ANC 8540, ALC 3900, normal hemoglobin, hematocrit and platelet count, CBC was repeated on 10/31/2023, white count was 12, ANC had normalized 6140, ALT was 4640, and 2012 white count was 13.2, the patient had had intermittent leukocytosis since 2012.      The patient returns for a follow-up visit, she was seen by Dr. Deluca, she had a workup done, was started on Plaquenil about 2 weeks ago, did not have significant improvement yet, she is complaining of intermittent rashes on her face and lower extremities.      Review of Systems;  CONSTITUTIONAL: No fever, chills.  Decreased appetite, positive for weight loss  ENMT: Eyes: No diplopia; Nose: No epistaxis. Mouth: No sore throat.  RESPIRATORY: No hemoptysis, shortness of breath, cough.   CARDIOVASCULAR: No chest pain, palpitations.  GASTROINTESTINAL: No nausea/vomiting, abdominal pain, diarrhea/constipation.  GENITOURINARY: No dysuria, urinary frequency, hematuria.  NEURO: No syncope, presyncope, headache.  Remainder:  ROS NEGATIVE    Past Medical History:      Diagnosis Date    Bulging lumbar disc     Chronic back pain     Compression of nerve     Headache(784.0)     Insomnia     Tachycardia     takes metoprolol for irreg HR      Patient Active Problem List   Diagnosis    Shoulder bursitis    Impingement syndrome of shoulder    Shoulder pain    Biceps tendonitis    Worsening headaches    Back pain    Chronic migraine

## 2024-12-03 ENCOUNTER — OFFICE VISIT (OUTPATIENT)
Dept: FAMILY MEDICINE CLINIC | Age: 49
End: 2024-12-03

## 2024-12-03 VITALS
RESPIRATION RATE: 18 BRPM | BODY MASS INDEX: 23.74 KG/M2 | HEART RATE: 82 BPM | DIASTOLIC BLOOD PRESSURE: 76 MMHG | SYSTOLIC BLOOD PRESSURE: 114 MMHG | HEIGHT: 63 IN | OXYGEN SATURATION: 99 % | WEIGHT: 134 LBS

## 2024-12-03 DIAGNOSIS — B00.1 COLD SORE: Primary | ICD-10-CM

## 2024-12-03 DIAGNOSIS — M46.96 UNSPECIFIED INFLAMMATORY SPONDYLOPATHY, LUMBAR REGION (HCC): ICD-10-CM

## 2024-12-03 DIAGNOSIS — Z80.0 FAMILY HX OF COLON CANCER: ICD-10-CM

## 2024-12-03 DIAGNOSIS — K63.5 POLYP OF SIGMOID COLON, UNSPECIFIED TYPE: ICD-10-CM

## 2024-12-03 RX ORDER — VALACYCLOVIR HYDROCHLORIDE 1 G/1
1000 TABLET, FILM COATED ORAL 2 TIMES DAILY
Qty: 60 TABLET | Refills: 5 | Status: SHIPPED | OUTPATIENT
Start: 2024-12-03 | End: 2025-01-02

## 2024-12-03 RX ORDER — PREDNISONE 20 MG/1
40 TABLET ORAL DAILY
Qty: 10 TABLET | Refills: 0 | Status: SHIPPED | OUTPATIENT
Start: 2024-12-03 | End: 2024-12-08

## 2024-12-03 SDOH — ECONOMIC STABILITY: FOOD INSECURITY: WITHIN THE PAST 12 MONTHS, THE FOOD YOU BOUGHT JUST DIDN'T LAST AND YOU DIDN'T HAVE MONEY TO GET MORE.: NEVER TRUE

## 2024-12-03 SDOH — ECONOMIC STABILITY: FOOD INSECURITY: WITHIN THE PAST 12 MONTHS, YOU WORRIED THAT YOUR FOOD WOULD RUN OUT BEFORE YOU GOT MONEY TO BUY MORE.: NEVER TRUE

## 2024-12-03 SDOH — ECONOMIC STABILITY: INCOME INSECURITY: HOW HARD IS IT FOR YOU TO PAY FOR THE VERY BASICS LIKE FOOD, HOUSING, MEDICAL CARE, AND HEATING?: NOT HARD AT ALL

## 2024-12-03 SDOH — ECONOMIC STABILITY: TRANSPORTATION INSECURITY
IN THE PAST 12 MONTHS, HAS LACK OF TRANSPORTATION KEPT YOU FROM MEETINGS, WORK, OR FROM GETTING THINGS NEEDED FOR DAILY LIVING?: NO

## 2024-12-03 ASSESSMENT — PATIENT HEALTH QUESTIONNAIRE - PHQ9
SUM OF ALL RESPONSES TO PHQ QUESTIONS 1-9: 2
SUM OF ALL RESPONSES TO PHQ9 QUESTIONS 1 & 2: 2
SUM OF ALL RESPONSES TO PHQ QUESTIONS 1-9: 2
SUM OF ALL RESPONSES TO PHQ9 QUESTIONS 1 & 2: 2
SUM OF ALL RESPONSES TO PHQ QUESTIONS 1-9: 2
SUM OF ALL RESPONSES TO PHQ QUESTIONS 1-9: 2
1. LITTLE INTEREST OR PLEASURE IN DOING THINGS: SEVERAL DAYS
2. FEELING DOWN, DEPRESSED OR HOPELESS: SEVERAL DAYS
2. FEELING DOWN, DEPRESSED OR HOPELESS: SEVERAL DAYS
1. LITTLE INTEREST OR PLEASURE IN DOING THINGS: SEVERAL DAYS

## 2024-12-07 ASSESSMENT — ENCOUNTER SYMPTOMS
BACK PAIN: 0
BLOOD IN STOOL: 0
RECTAL PAIN: 0
PHOTOPHOBIA: 0
SORE THROAT: 0
WHEEZING: 0
ANAL BLEEDING: 0
SHORTNESS OF BREATH: 0
STRIDOR: 0
APNEA: 0
ABDOMINAL DISTENTION: 0
TROUBLE SWALLOWING: 0
NAUSEA: 0
EYE DISCHARGE: 0
COLOR CHANGE: 0
CHOKING: 0
SINUS PRESSURE: 0
COUGH: 0
EYE PAIN: 0
VOICE CHANGE: 0
EYE ITCHING: 0
DIARRHEA: 0
ABDOMINAL PAIN: 0
CONSTIPATION: 0
EYE REDNESS: 0
FACIAL SWELLING: 0
VOMITING: 0
CHEST TIGHTNESS: 0
RHINORRHEA: 0

## 2024-12-07 NOTE — PROGRESS NOTES
Surgery, [place]        [place] with own gynecologist for gynecological and breast care.    Reviewed health maintenance report. Patient is aware of deficiencies and suggested preventative tests.

## 2025-01-06 ENCOUNTER — TELEPHONE (OUTPATIENT)
Dept: RHEUMATOLOGY | Age: 50
End: 2025-01-06

## 2025-01-07 ENCOUNTER — OFFICE VISIT (OUTPATIENT)
Dept: RHEUMATOLOGY | Age: 50
End: 2025-01-07
Payer: COMMERCIAL

## 2025-01-07 VITALS
OXYGEN SATURATION: 100 % | BODY MASS INDEX: 23.39 KG/M2 | WEIGHT: 132 LBS | SYSTOLIC BLOOD PRESSURE: 138 MMHG | HEART RATE: 94 BPM | DIASTOLIC BLOOD PRESSURE: 85 MMHG | HEIGHT: 63 IN

## 2025-01-07 DIAGNOSIS — Z79.899 HIGH RISK MEDICATION USE: ICD-10-CM

## 2025-01-07 DIAGNOSIS — M35.9 UNDIFFERENTIATED CONNECTIVE TISSUE DISEASE (HCC): Primary | ICD-10-CM

## 2025-01-07 DIAGNOSIS — M35.01 SJOGREN'S SYNDROME WITH KERATOCONJUNCTIVITIS SICCA (HCC): ICD-10-CM

## 2025-01-07 PROCEDURE — 99214 OFFICE O/P EST MOD 30 MIN: CPT | Performed by: INTERNAL MEDICINE

## 2025-01-07 PROCEDURE — G2211 COMPLEX E/M VISIT ADD ON: HCPCS | Performed by: INTERNAL MEDICINE

## 2025-01-07 RX ORDER — CEVIMELINE HYDROCHLORIDE 30 MG/1
30 CAPSULE ORAL 3 TIMES DAILY
Qty: 90 CAPSULE | Refills: 5 | Status: SHIPPED | OUTPATIENT
Start: 2025-01-07

## 2025-01-07 ASSESSMENT — ENCOUNTER SYMPTOMS
COUGH: 0
BACK PAIN: 1
COLOR CHANGE: 1
VOMITING: 0
NAUSEA: 0
DIARRHEA: 0
TROUBLE SWALLOWING: 0
SHORTNESS OF BREATH: 1
ABDOMINAL PAIN: 0

## 2025-01-07 NOTE — PROGRESS NOTES
is alert and oriented to person, place, and time. Mental status is at baseline.   Psychiatric:         Mood and Affect: Mood normal.         Behavior: Behavior normal.            Lab Results   Component Value Date    WBC 12.2 (H) 10/23/2024    HGB 13.2 10/23/2024    HCT 37.6 10/23/2024    MCV 89.7 10/23/2024     10/23/2024     Lab Results   Component Value Date     09/27/2023    K 4.3 09/27/2023     09/27/2023    CO2 21 (L) 09/27/2023    BUN 14 09/27/2023    CREATININE 0.9 09/27/2023    GLUCOSE 80 09/27/2023    CALCIUM 9.2 09/27/2023    BILITOT <0.2 09/27/2023    ALKPHOS 83 09/27/2023    AST 16 09/27/2023    ALT 10 09/27/2023    LABGLOM >60 09/27/2023    GFRAA >60 05/03/2019     Lab Results   Component Value Date    LILI POSITIVE (A) 12/18/2023     No components found for: \"RHEUMFACTOR\"  No results found for: \"SEDRATE\"  No results found for: \"CRP\"         An electronic signature was used to authenticate this note.    This note was generated with a voice recognition dictation system. Please disregard any errors or omission which have escaped my review.    --Delroy Deluca,

## 2025-01-15 ENCOUNTER — HOSPITAL ENCOUNTER (OUTPATIENT)
Age: 50
Discharge: HOME | End: 2025-01-15
Payer: COMMERCIAL

## 2025-01-15 DIAGNOSIS — M35.9 UNDIFFERENTIATED CONNECTIVE TISSUE DISEASE (HCC): ICD-10-CM

## 2025-01-15 DIAGNOSIS — Z79.899 HIGH RISK MEDICATION USE: ICD-10-CM

## 2025-01-15 LAB
ALBUMIN SERPL-MCNC: 4.3 G/DL (ref 3.5–5.2)
ALP SERPL-CCNC: 69 U/L (ref 35–104)
ALT SERPL-CCNC: 13 U/L (ref 0–32)
ANION GAP SERPL CALCULATED.3IONS-SCNC: 9 MMOL/L (ref 7–16)
AST SERPL-CCNC: 16 U/L (ref 0–31)
BACTERIA URNS QL MICRO: ABNORMAL
BASOPHILS # BLD: 0.09 K/UL (ref 0–0.2)
BASOPHILS NFR BLD: 1 % (ref 0–2)
BILIRUB SERPL-MCNC: 0.2 MG/DL (ref 0–1.2)
BILIRUB UR QL STRIP: NEGATIVE
BUN SERPL-MCNC: 11 MG/DL (ref 6–20)
C3 SERPL-MCNC: 133 MG/DL (ref 90–180)
C4 SERPL-MCNC: 20 MG/DL (ref 10–40)
CALCIUM SERPL-MCNC: 9.2 MG/DL (ref 8.6–10.2)
CHLORIDE SERPL-SCNC: 106 MMOL/L (ref 98–107)
CLARITY UR: CLEAR
CO2 SERPL-SCNC: 27 MMOL/L (ref 22–29)
COLOR UR: YELLOW
CREAT SERPL-MCNC: 0.9 MG/DL (ref 0.5–1)
CREAT UR-MCNC: 297.4 MG/DL (ref 29–226)
EOSINOPHIL # BLD: 0.39 K/UL (ref 0.05–0.5)
EOSINOPHILS RELATIVE PERCENT: 3 % (ref 0–6)
ERYTHROCYTE [DISTWIDTH] IN BLOOD BY AUTOMATED COUNT: 13.1 % (ref 11.5–15)
GFR, ESTIMATED: 77 ML/MIN/1.73M2
GLUCOSE SERPL-MCNC: 96 MG/DL (ref 74–99)
GLUCOSE UR STRIP-MCNC: NEGATIVE MG/DL
HCT VFR BLD AUTO: 42.4 % (ref 34–48)
HGB BLD-MCNC: 14.5 G/DL (ref 11.5–15.5)
HGB UR QL STRIP.AUTO: ABNORMAL
IMM GRANULOCYTES # BLD AUTO: 0.07 K/UL (ref 0–0.58)
IMM GRANULOCYTES NFR BLD: 1 % (ref 0–5)
KETONES UR STRIP-MCNC: ABNORMAL MG/DL
LEUKOCYTE ESTERASE UR QL STRIP: NEGATIVE
LYMPHOCYTES NFR BLD: 2.99 K/UL (ref 1.5–4)
LYMPHOCYTES RELATIVE PERCENT: 23 % (ref 20–42)
MCH RBC QN AUTO: 31.3 PG (ref 26–35)
MCHC RBC AUTO-ENTMCNC: 34.2 G/DL (ref 32–34.5)
MCV RBC AUTO: 91.6 FL (ref 80–99.9)
MONOCYTES NFR BLD: 0.46 K/UL (ref 0.1–0.95)
MONOCYTES NFR BLD: 4 % (ref 2–12)
NEUTROPHILS NFR BLD: 69 % (ref 43–80)
NEUTS SEG NFR BLD: 8.87 K/UL (ref 1.8–7.3)
NITRITE UR QL STRIP: NEGATIVE
PH UR STRIP: 5.5 [PH] (ref 5–9)
PLATELET # BLD AUTO: 378 K/UL (ref 130–450)
PMV BLD AUTO: 8.5 FL (ref 7–12)
POTASSIUM SERPL-SCNC: 4 MMOL/L (ref 3.5–5)
PROT SERPL-MCNC: 6.8 G/DL (ref 6.4–8.3)
PROT UR STRIP-MCNC: ABNORMAL MG/DL
RBC # BLD AUTO: 4.63 M/UL (ref 3.5–5.5)
RBC #/AREA URNS HPF: ABNORMAL /HPF
SODIUM SERPL-SCNC: 142 MMOL/L (ref 132–146)
SP GR UR STRIP: >1.03 (ref 1–1.03)
TOTAL PROTEIN, URINE: 16 MG/DL (ref 0–12)
URINE TOTAL PROTEIN CREATININE RATIO: 0.05 (ref 0–0.2)
UROBILINOGEN UR STRIP-ACNC: 0.2 EU/DL (ref 0–1)
WBC #/AREA URNS HPF: ABNORMAL /HPF
WBC OTHER # BLD: 12.9 K/UL (ref 4.5–11.5)

## 2025-01-15 PROCEDURE — 81001 URINALYSIS AUTO W/SCOPE: CPT

## 2025-01-15 PROCEDURE — 86160 COMPLEMENT ANTIGEN: CPT

## 2025-01-15 PROCEDURE — 84156 ASSAY OF PROTEIN URINE: CPT

## 2025-01-15 PROCEDURE — 36415 COLL VENOUS BLD VENIPUNCTURE: CPT

## 2025-01-15 PROCEDURE — 86225 DNA ANTIBODY NATIVE: CPT

## 2025-01-15 PROCEDURE — 85025 COMPLETE CBC W/AUTO DIFF WBC: CPT

## 2025-01-15 PROCEDURE — 82570 ASSAY OF URINE CREATININE: CPT

## 2025-01-15 PROCEDURE — 80053 COMPREHEN METABOLIC PANEL: CPT

## 2025-01-16 LAB — ANTI DNA DOUBLE STRANDED: NEGATIVE

## 2025-01-23 ENCOUNTER — OFFICE VISIT (OUTPATIENT)
Dept: FAMILY MEDICINE CLINIC | Age: 50
End: 2025-01-23

## 2025-01-23 VITALS
SYSTOLIC BLOOD PRESSURE: 126 MMHG | BODY MASS INDEX: 24.27 KG/M2 | WEIGHT: 137 LBS | OXYGEN SATURATION: 99 % | RESPIRATION RATE: 18 BRPM | HEART RATE: 90 BPM | DIASTOLIC BLOOD PRESSURE: 80 MMHG | HEIGHT: 63 IN

## 2025-01-23 DIAGNOSIS — T78.40XA IGE-MEDIATED ALLERGIC DISORDER: ICD-10-CM

## 2025-01-23 DIAGNOSIS — J34.2 DEVIATED SEPTUM: ICD-10-CM

## 2025-01-23 DIAGNOSIS — J32.0 CHRONIC MAXILLARY SINUSITIS: ICD-10-CM

## 2025-01-23 DIAGNOSIS — J30.9 MULTIPLE RESPIRATORY ALLERGIES: ICD-10-CM

## 2025-01-23 DIAGNOSIS — J30.9 MULTIPLE RESPIRATORY ALLERGIES: Primary | ICD-10-CM

## 2025-01-23 DIAGNOSIS — R53.82 CHRONIC FATIGUE: ICD-10-CM

## 2025-01-23 RX ORDER — CEFDINIR 300 MG/1
300 CAPSULE ORAL 2 TIMES DAILY
Qty: 28 CAPSULE | Refills: 0 | Status: SHIPPED | OUTPATIENT
Start: 2025-01-23 | End: 2025-02-06

## 2025-01-23 RX ORDER — BROMPHENIRAMINE MALEATE, PSEUDOEPHEDRINE HYDROCHLORIDE, AND DEXTROMETHORPHAN HYDROBROMIDE 2; 30; 10 MG/5ML; MG/5ML; MG/5ML
5 SYRUP ORAL 4 TIMES DAILY PRN
Qty: 180 ML | Refills: 0 | Status: SHIPPED | OUTPATIENT
Start: 2025-01-23

## 2025-01-23 RX ORDER — PREDNISONE 20 MG/1
20 TABLET ORAL DAILY
Qty: 5 TABLET | Refills: 0 | Status: SHIPPED | OUTPATIENT
Start: 2025-01-23 | End: 2025-01-28

## 2025-01-23 SDOH — ECONOMIC STABILITY: FOOD INSECURITY: WITHIN THE PAST 12 MONTHS, YOU WORRIED THAT YOUR FOOD WOULD RUN OUT BEFORE YOU GOT MONEY TO BUY MORE.: PATIENT DECLINED

## 2025-01-23 SDOH — ECONOMIC STABILITY: FOOD INSECURITY: WITHIN THE PAST 12 MONTHS, THE FOOD YOU BOUGHT JUST DIDN'T LAST AND YOU DIDN'T HAVE MONEY TO GET MORE.: PATIENT DECLINED

## 2025-01-23 ASSESSMENT — PATIENT HEALTH QUESTIONNAIRE - PHQ9
1. LITTLE INTEREST OR PLEASURE IN DOING THINGS: NOT AT ALL
SUM OF ALL RESPONSES TO PHQ QUESTIONS 1-9: 0
SUM OF ALL RESPONSES TO PHQ QUESTIONS 1-9: 0
2. FEELING DOWN, DEPRESSED OR HOPELESS: NOT AT ALL
SUM OF ALL RESPONSES TO PHQ QUESTIONS 1-9: 0
SUM OF ALL RESPONSES TO PHQ9 QUESTIONS 1 & 2: 0
SUM OF ALL RESPONSES TO PHQ QUESTIONS 1-9: 0

## 2025-01-24 ASSESSMENT — ENCOUNTER SYMPTOMS
WHEEZING: 0
CHOKING: 0
EYE PAIN: 0
RECTAL PAIN: 0
TROUBLE SWALLOWING: 0
EYE ITCHING: 0
SORE THROAT: 0
COLOR CHANGE: 0
EYE DISCHARGE: 0
SHORTNESS OF BREATH: 0
BACK PAIN: 0
DIARRHEA: 0
APNEA: 0
ABDOMINAL PAIN: 0
PHOTOPHOBIA: 0
NAUSEA: 0
ABDOMINAL DISTENTION: 0
VOMITING: 0
SINUS PRESSURE: 1
CHEST TIGHTNESS: 0
STRIDOR: 0
RHINORRHEA: 1
SINUS PAIN: 1
ANAL BLEEDING: 0
COUGH: 1
CONSTIPATION: 0
EYE REDNESS: 0
VOICE CHANGE: 0
BLOOD IN STOOL: 0
FACIAL SWELLING: 0

## 2025-01-24 NOTE — PROGRESS NOTES
Cb Corbett is a 49 y.o. female  .  Subjective:      Chronic recurrent sinusitis. Will set up with ENT. Following with hematology as well as rheumatology. Is a chance that chronic sinus issue may be affecting white blood count. Is being treated successfully for Sjogren's as well as possible unclassified connective tissue disorder. Will follow with them. Will follow with own gynecologist for gynecological and breast care.   Will check allergy profile as well.  This was a 30 min visit         Review of Systems   Constitutional:  Negative for activity change, appetite change, chills, diaphoresis, fatigue, fever and unexpected weight change.   HENT:  Positive for postnasal drip, rhinorrhea, sinus pressure, sinus pain and sneezing. Negative for congestion, dental problem, drooling, ear discharge, ear pain, facial swelling, hearing loss, mouth sores, nosebleeds, sore throat, tinnitus, trouble swallowing and voice change.    Eyes:  Negative for photophobia, pain, discharge, redness, itching and visual disturbance.   Respiratory:  Positive for cough. Negative for apnea, choking, chest tightness, shortness of breath, wheezing and stridor.    Cardiovascular:  Negative for chest pain, palpitations and leg swelling.   Gastrointestinal:  Negative for abdominal distention, abdominal pain, anal bleeding, blood in stool, constipation, diarrhea, nausea, rectal pain and vomiting.   Endocrine: Negative for cold intolerance, heat intolerance, polydipsia, polyphagia and polyuria.   Genitourinary:  Negative for decreased urine volume, difficulty urinating, dyspareunia, dysuria, enuresis, flank pain, frequency, genital sores, hematuria, menstrual problem, pelvic pain, urgency, vaginal bleeding, vaginal discharge and vaginal pain.   Musculoskeletal:  Negative for arthralgias, back pain, gait problem, joint swelling, myalgias, neck pain and neck stiffness.   Skin:  Negative for color change, pallor, rash and wound.

## 2025-01-27 LAB
ALLERGEN BERMUDA GRASS IGE: <0.1 KU/L (ref 0–0.34)
ALLERGEN BIRCH IGE: <0.1 KU/L (ref 0–0.34)
ALLERGEN DOG DANDER IGE: <0.1 KU/L (ref 0–0.34)
ALLERGEN GERMAN COCKROACH IGE: <0.1 KU/L (ref 0–0.34)
ALLERGEN HORMODENDRUM IGE: <0.1 KUL/L (ref 0–0.34)
ALLERGEN MOUSE EPITHELIA IGE: <0.1 KU/L (ref 0–0.34)
ALLERGEN OAK TREE IGE: <0.1 KU/L (ref 0–0.34)
ALLERGEN PECAN TREE IGE: <0.1 KU/L (ref 0–0.34)
ALLERGEN PIGWEED ROUGH IGE: <0.1 KU/L (ref 0–0.34)
ALLERGEN SHEEP SORREL (W18) IGE: <0.1 KU/L (ref 0–0.34)
ALLERGEN TREE SYCAMORE: <0.1 KU/L (ref 0–0.34)
ALLERGEN WALNUT TREE IGE: <0.1 KU/L (ref 0–0.34)
ALLERGEN WHITE MULBERRY TREE, IGE: <0.1 KU/L (ref 0–0.34)
ALLERGEN, TREE, WHITE ASH IGE: <0.1 KU/L (ref 0–0.34)
ALTERNARIA ALTERNATA: <0.1 KU/L (ref 0–0.34)
ASPERGILLUS FUMIGATUS: <0.1 KU/L (ref 0–0.34)
CAT DANDER ANTIBODY: <0.1 KU/L (ref 0–0.34)
COTTONWOOD TREE: <0.1 KU/L (ref 0–0.34)
D. FARINAE: <0.1 KU/L (ref 0–0.34)
D. PTERONYSSINUS: <0.1 KU/L (ref 0–0.34)
ELM TREE: <0.1 KU/L (ref 0–0.34)
IGE: 13 IU/ML (ref 0–100)
MAPLE/BOXELDER TREE: <0.1 KU/L (ref 0–0.34)
MOUNTAIN CEDAR TREE: <0.1 KU/L (ref 0–0.34)
MUCOR RACEMOSUS: <0.1 KU/L (ref 0–0.34)
P. NOTATUM: <0.1 KU/L (ref 0–0.34)
RUSSIAN THISTLE: <0.1 KU/L (ref 0–0.34)
SHORT RAGWD(A ARTEMIS.) IGE: NORMAL KU/L (ref 0–0.34)
TIMOTHY GRASS: <0.1 KU/L (ref 0–0.34)

## 2025-02-10 ENCOUNTER — TELEPHONE (OUTPATIENT)
Dept: ENT CLINIC | Age: 50
End: 2025-02-10

## 2025-02-10 NOTE — TELEPHONE ENCOUNTER
Pt called in to r/s her appt on 4/2/25, she has a work conflict. Cb can be reached at [phone]

## 2025-02-18 RX ORDER — AMOXICILLIN 875 MG/1
875 TABLET, COATED ORAL 2 TIMES DAILY
Qty: 28 TABLET | Refills: 0 | Status: SHIPPED | OUTPATIENT
Start: 2025-02-18 | End: 2025-03-04

## 2025-02-18 RX ORDER — DEXAMETHASONE 4 MG/1
4 TABLET ORAL 2 TIMES DAILY WITH MEALS
Qty: 20 TABLET | Refills: 0 | Status: SHIPPED | OUTPATIENT
Start: 2025-02-18 | End: 2025-02-28

## 2025-02-18 RX ORDER — DOXYCYCLINE HYCLATE 100 MG
100 TABLET ORAL 2 TIMES DAILY
Qty: 28 TABLET | Refills: 0 | Status: SHIPPED | OUTPATIENT
Start: 2025-02-18 | End: 2025-03-04

## 2025-04-09 ENCOUNTER — OFFICE VISIT (OUTPATIENT)
Dept: ENT CLINIC | Age: 50
End: 2025-04-09
Payer: COMMERCIAL

## 2025-04-09 VITALS
DIASTOLIC BLOOD PRESSURE: 84 MMHG | WEIGHT: 44.7 LBS | SYSTOLIC BLOOD PRESSURE: 138 MMHG | HEART RATE: 90 BPM | TEMPERATURE: 98 F | HEIGHT: 63 IN | OXYGEN SATURATION: 98 % | BODY MASS INDEX: 7.92 KG/M2

## 2025-04-09 DIAGNOSIS — E01.0 THYROMEGALY: Primary | ICD-10-CM

## 2025-04-09 DIAGNOSIS — J34.2 NASAL SEPTAL DEVIATION: ICD-10-CM

## 2025-04-09 DIAGNOSIS — J32.8 OTHER CHRONIC SINUSITIS: ICD-10-CM

## 2025-04-09 DIAGNOSIS — R09.81 NASAL CONGESTION: ICD-10-CM

## 2025-04-09 DIAGNOSIS — J34.3 NASAL TURBINATE HYPERTROPHY: ICD-10-CM

## 2025-04-09 DIAGNOSIS — R09.82 POST-NASAL DRAINAGE: ICD-10-CM

## 2025-04-09 PROCEDURE — 99204 OFFICE O/P NEW MOD 45 MIN: CPT | Performed by: OTOLARYNGOLOGY

## 2025-04-09 RX ORDER — AZELASTINE 1 MG/ML
2 SPRAY, METERED NASAL DAILY
Qty: 1 EACH | Refills: 3 | Status: SHIPPED | OUTPATIENT
Start: 2025-04-09 | End: 2025-05-09

## 2025-04-09 RX ORDER — LEVOCETIRIZINE DIHYDROCHLORIDE 5 MG/1
5 TABLET, FILM COATED ORAL NIGHTLY
Qty: 90 TABLET | Refills: 3 | Status: SHIPPED | OUTPATIENT
Start: 2025-04-09

## 2025-04-09 ASSESSMENT — ENCOUNTER SYMPTOMS
RHINORRHEA: 0
SINUS PRESSURE: 1
ALLERGIC/IMMUNOLOGIC NEGATIVE: 1
RESPIRATORY NEGATIVE: 1
SORE THROAT: 0

## 2025-04-09 NOTE — PROGRESS NOTES
Department of Otolaryngology  Office Consult Note  4/9/25          Subjective:        Chief Complaint:  had concerns including New Patient (NEW PATIENT - NP Deviated septum/ sinusitis/).     Patient ID: Cb Corbett is a 49 y.o. female.    HPI: Patient presents as  new patient for chronic sinusitis. Patient stats she gets several sinus infections a year described as right > left maxillary sinus pressure and swelling, nasal congestion, drainage and ear fullness/fluid. Not on anything regularly   She has taken multiple rounds of antibiotic and steroids   Had allergy testing that was negative   Is on plaquenil for autoimmune disorder  She does have sjogrens disease   Following with hematology for leukocytosis     Review of Systems   Constitutional: Negative.    HENT:  Positive for congestion, postnasal drip and sinus pressure. Negative for ear discharge, ear pain, hearing loss, rhinorrhea, sneezing and sore throat.    Respiratory: Negative.     Cardiovascular:  Negative for chest pain.   Musculoskeletal: Negative.    Skin: Negative.    Allergic/Immunologic: Negative.    Neurological: Negative.    Psychiatric/Behavioral: Negative.           Past Medical History:   Diagnosis Date    Bulging lumbar disc     Chronic back pain     Compression of nerve     Headache(784.0)     Insomnia     Tachycardia     takes metoprolol for irreg HR      Past Surgical History:   Procedure Laterality Date    BICEPS TENDON REPAIR Right     BREAST ENHANCEMENT SURGERY Bilateral     BREAST SURGERY  augmentation    COSMETIC SURGERY      HEMORROIDECTOMY N/A 05/07/2019    EXAM UNDER ANESTHESIA HEMORRHOIDECTOMY performed by Gonzalo Amezquita MD at [facility] OR    KNEE ARTHROSCOPY  right    MEDICATION INJECTION Left 07/26/2021    LEFT SACROILIAC JOINT INJECTION UNDER FLUOROSCOPY performed by Corey Dolan MD at [facility] OR    NERVE BLOCK Right 10/12/2015    lumbar right transforaminal nerve block #1 l4-5 l5-s1    NERVE BLOCK Right 10/22/2015

## 2025-04-22 ENCOUNTER — TELEPHONE (OUTPATIENT)
Dept: SURGERY | Age: 50
End: 2025-04-22

## 2025-04-22 ENCOUNTER — PREP FOR PROCEDURE (OUTPATIENT)
Dept: SURGERY | Age: 50
End: 2025-04-22

## 2025-04-22 ENCOUNTER — OFFICE VISIT (OUTPATIENT)
Dept: SURGERY | Age: 50
End: 2025-04-22
Payer: COMMERCIAL

## 2025-04-22 VITALS
BODY MASS INDEX: 25.16 KG/M2 | OXYGEN SATURATION: 97 % | HEART RATE: 87 BPM | WEIGHT: 142 LBS | DIASTOLIC BLOOD PRESSURE: 82 MMHG | SYSTOLIC BLOOD PRESSURE: 136 MMHG | RESPIRATION RATE: 16 BRPM | HEIGHT: 63 IN

## 2025-04-22 DIAGNOSIS — Z86.0100 HX OF COLONIC POLYPS: ICD-10-CM

## 2025-04-22 DIAGNOSIS — Z80.0 FAMILY HX OF COLON CANCER: ICD-10-CM

## 2025-04-22 DIAGNOSIS — Z80.0 FAMILY HISTORY OF COLON CANCER: ICD-10-CM

## 2025-04-22 DIAGNOSIS — Z86.0100 HX OF COLONIC POLYPS: Primary | ICD-10-CM

## 2025-04-22 PROCEDURE — 99203 OFFICE O/P NEW LOW 30 MIN: CPT | Performed by: SURGERY

## 2025-04-22 NOTE — PROGRESS NOTES
[facility]  History and Physical    Patient's Name/Date of Birth: Cb Corbett / 1975 (49 y.o.)    PCP:  Dr. Cordova    Chief Complaint:  colonoscopy eval    History of Present Illness:  49 yr old female referred for colonoscopy eval.  Pt states she had colonoscopy 2015--found to have polyp.  Pt states her mother and father both had hx of colon cancer.  Pt denies abd pain.  Pt states she has always had loose stools.  Pt states sometimes she can barely make it to the bathroom.  States she has had dark blood in her stools.  States she has felt blood come out and went to wipe and has found dark blood then, too.  Pt denies unintentional weight loss.  Pt denies heartburn/indigestion.    Past Medical History:   Diagnosis Date    Bulging lumbar disc     Chronic back pain     Compression of nerve     Headache(784.0)     Insomnia     Tachycardia     takes metoprolol for irreg HR        Past Surgical History:   Procedure Laterality Date    BICEPS TENDON REPAIR Right     BREAST ENHANCEMENT SURGERY Bilateral     BREAST SURGERY  augmentation    COSMETIC SURGERY      HEMORROIDECTOMY N/A 05/07/2019    EXAM UNDER ANESTHESIA HEMORRHOIDECTOMY performed by Gonzalo Amezquita MD at [facility] OR    KNEE ARTHROSCOPY  right    MEDICATION INJECTION Left 07/26/2021    LEFT SACROILIAC JOINT INJECTION UNDER FLUOROSCOPY performed by Corey Dolan MD at [facility] OR    NERVE BLOCK Right 10/12/2015    lumbar right transforaminal nerve block #1 l4-5 l5-s1    NERVE BLOCK Right 10/22/2015    Transforaminal lumbar nerve block #2    NERVE BLOCK Right 11/02/2015    transforaminal nerve block right lumbar #3    NERVE BLOCK  11/11/2015    sacroiliac joint right #1    NERVE BLOCK  11/18/2015    sacroiliac joint right #2    NERVE BLOCK  11/25/2015    sacroiliac joint right #3    NERVE BLOCK Right 01/18/2015    hip inj #1    NERVE BLOCK Right 02/01/2016    hip injection #2    NERVE BLOCK Bilateral 07/25/2016    Bilateral

## 2025-04-22 NOTE — PATIENT INSTRUCTIONS
Your colon must be completely clean before the procedure. Any stool left in the intestine will block the view. This preparation may start several days before the procedure. Follow your doctor's instructions.    Leading up to your procedure:   Talk to your doctor about your medicines. You may be asked to stop taking some medicines up to one week before the procedure, like:   Anti-inflammatory drugs (e.g., aspirin )   Blood thinners like clopidogrel (Plavix) or warfarin (Coumadin)   Iron supplements or vitamins containing iron   The day or days before your procedure, go on a clear liquid diet (clear broth, clear juice, clear jello) with no red coloring  Do not eat or drink anything after midnight.   Wear comfortable clothing.   If you have diabetes, ask your doctor if you need to adjust your diabetes medicine on the day prior to your procedure and the day of your procedure.   Arrange for a ride home after the procedure.     Anesthesia   You will receive intravenous sedation medicine for the procedure so you will not feel anything during the procedure.     Description of the Procedure   You will lie on your left side with knees bent and drawn up toward your chest. The colonoscope will be slowly inserted through the rectum and into the bowel. The colonoscope will inject air into the colon. A small attached video camera will allow the doctor to view the colon's lining on a screen. The doctor will continue guiding the tool through the bowel and assess the lining. A tissue sample or polyps may be removed during the procedure.     How Long Will It Take?   Usually it takes about 30 to 45 minutes     Will It Hurt?   Most people do not feel anything during the procedure and will not remember the procedure.      After the procedure, gas pains and cramping are common. These pains should go away with the passing of gas.     Post-procedure Care   If any tissue was removed:   It will be sent to a lab to be examined. It may take

## 2025-04-22 NOTE — TELEPHONE ENCOUNTER
Scheduled pt for colonoscopy with Dr. Jules on 6/17/25 at 730am. Pt needs to arrive at [facility] at 630am. Patient confirmed date and time for procedure. Address, directions, and prep instructions given in office. Patient verbalized full understanding.      Electronically signed by Arelis Patten MA on 4/22/2025 at 3:46 PM

## 2025-05-09 RX ORDER — AZELASTINE HYDROCHLORIDE 137 UG/1
SPRAY, METERED NASAL
Qty: 90 ML | Refills: 3 | Status: SHIPPED | OUTPATIENT
Start: 2025-05-09

## 2025-05-12 ENCOUNTER — OFFICE VISIT (OUTPATIENT)
Dept: ONCOLOGY | Age: 50
End: 2025-05-12
Payer: COMMERCIAL

## 2025-05-12 ENCOUNTER — HOSPITAL ENCOUNTER (OUTPATIENT)
Dept: INFUSION THERAPY | Age: 50
Discharge: HOME | End: 2025-05-12
Payer: COMMERCIAL

## 2025-05-12 ENCOUNTER — TELEPHONE (OUTPATIENT)
Dept: SURGERY | Age: 50
End: 2025-05-12

## 2025-05-12 VITALS
HEIGHT: 63 IN | WEIGHT: 141.5 LBS | SYSTOLIC BLOOD PRESSURE: 115 MMHG | BODY MASS INDEX: 25.07 KG/M2 | HEART RATE: 78 BPM | TEMPERATURE: 97.8 F | DIASTOLIC BLOOD PRESSURE: 79 MMHG | OXYGEN SATURATION: 100 %

## 2025-05-12 DIAGNOSIS — R76.8 POSITIVE ANA (ANTINUCLEAR ANTIBODY): Primary | ICD-10-CM

## 2025-05-12 DIAGNOSIS — E01.0 THYROMEGALY: ICD-10-CM

## 2025-05-12 DIAGNOSIS — R76.8 POSITIVE ANA (ANTINUCLEAR ANTIBODY): ICD-10-CM

## 2025-05-12 DIAGNOSIS — D72.828 OTHER ELEVATED WHITE BLOOD CELL (WBC) COUNT: ICD-10-CM

## 2025-05-12 DIAGNOSIS — D72.828 OTHER ELEVATED WHITE BLOOD CELL (WBC) COUNT: Primary | ICD-10-CM

## 2025-05-12 LAB
BASOPHILS # BLD: 0.11 K/UL (ref 0–0.2)
BASOPHILS NFR BLD: 1 % (ref 0–2)
EOSINOPHIL # BLD: 0.36 K/UL (ref 0.05–0.5)
EOSINOPHILS RELATIVE PERCENT: 4 % (ref 0–6)
ERYTHROCYTE [DISTWIDTH] IN BLOOD BY AUTOMATED COUNT: 12.7 % (ref 11.5–15)
HCT VFR BLD AUTO: 40.7 % (ref 34–48)
HGB BLD-MCNC: 13.7 G/DL (ref 11.5–15.5)
IMM GRANULOCYTES # BLD AUTO: 0.03 K/UL (ref 0–0.58)
IMM GRANULOCYTES NFR BLD: 0 % (ref 0–5)
LYMPHOCYTES NFR BLD: 2.85 K/UL (ref 1.5–4)
LYMPHOCYTES RELATIVE PERCENT: 32 % (ref 20–42)
MCH RBC QN AUTO: 31.4 PG (ref 26–35)
MCHC RBC AUTO-ENTMCNC: 33.7 G/DL (ref 32–34.5)
MCV RBC AUTO: 93.3 FL (ref 80–99.9)
MONOCYTES NFR BLD: 0.59 K/UL (ref 0.1–0.95)
MONOCYTES NFR BLD: 7 % (ref 2–12)
NEUTROPHILS NFR BLD: 57 % (ref 43–80)
NEUTS SEG NFR BLD: 5.12 K/UL (ref 1.8–7.3)
PLATELET # BLD AUTO: 394 K/UL (ref 130–450)
PMV BLD AUTO: 8.8 FL (ref 7–12)
RBC # BLD AUTO: 4.36 M/UL (ref 3.5–5.5)
WBC OTHER # BLD: 9.1 K/UL (ref 4.5–11.5)

## 2025-05-12 PROCEDURE — 85025 COMPLETE CBC W/AUTO DIFF WBC: CPT

## 2025-05-12 PROCEDURE — 36415 COLL VENOUS BLD VENIPUNCTURE: CPT

## 2025-05-12 PROCEDURE — 99212 OFFICE O/P EST SF 10 MIN: CPT

## 2025-05-12 PROCEDURE — 99214 OFFICE O/P EST MOD 30 MIN: CPT | Performed by: INTERNAL MEDICINE

## 2025-05-12 NOTE — TELEPHONE ENCOUNTER
MA received a call from patient who states due to her work schedule she is not able to undergo the upcoming procedure with Dr. Jules in June. MA understood and explained I would forward the message to ARIANA Infante for rescheduling purposes. Patient verbalized understanding and is ok with pushing until July.    MA spoke with Angelina at surgery scheduling and cx the procedure.  Electronically signed by Falguni Cotto MA on 5/12/25 at 12:32 PM EDT

## 2025-05-12 NOTE — PROGRESS NOTES
[facility] MEDICAL ONCOLOGY  [address] [address]  Dept: [phone]  Loc: [phone]  Attending progress note      Reason for Visit:   Leukocytosis.    Referring Physician:  Jeronimo Cordova DO    PCP:  Jeronimo Cordova DO    History of Present Illness:      Ms. Corbett is a pleasant 49-year-old lady, with a past medical history significant for chronic back pain, who was referred to the hematology office for evaluation of leukocytosis.  On 9/27/2023 her white count was 13.3, ANC 8540, ALC 3900, normal hemoglobin, hematocrit and platelet count, CBC was repeated on 10/31/2023, white count was 12, ANC had normalized 6140, ALT was 4640, and 2012 white count was 13.2, the patient had had intermittent leukocytosis since 2012.      The patient returns for a follow-up visit, she was seen by Dr. Deluca, she had a workup done, was started on Plaquenil, also on Evoxac.  She is following with ENT for sinus issues, she has a neck ultrasound ordered for enlarged thyroid gland.    Review of Systems;  CONSTITUTIONAL: No fever, chills.  Decreased appetite, positive for weight loss  ENMT: Eyes: No diplopia; Nose: No epistaxis. Mouth: No sore throat.  RESPIRATORY: No hemoptysis, shortness of breath, cough.   CARDIOVASCULAR: No chest pain, palpitations.  GASTROINTESTINAL: No nausea/vomiting, abdominal pain, diarrhea/constipation.  GENITOURINARY: No dysuria, urinary frequency, hematuria.  NEURO: No syncope, presyncope, headache.  Remainder:  ROS NEGATIVE    Past Medical History:      Diagnosis Date    Bulging lumbar disc     Chronic back pain     Compression of nerve     Headache(784.0)     Insomnia     Tachycardia     takes metoprolol for irreg HR      Patient Active Problem List   Diagnosis    Shoulder bursitis    Impingement syndrome of shoulder    Shoulder pain    Biceps tendonitis    Worsening headaches    Back pain    Chronic migraine    Protruded lumbar disc

## 2025-05-15 ENCOUNTER — HOSPITAL ENCOUNTER (OUTPATIENT)
Dept: ULTRASOUND IMAGING | Age: 50
Discharge: HOME | End: 2025-05-15
Payer: COMMERCIAL

## 2025-05-15 DIAGNOSIS — E01.0 THYROMEGALY: ICD-10-CM

## 2025-05-15 PROCEDURE — 76536 US EXAM OF HEAD AND NECK: CPT

## 2025-05-16 ENCOUNTER — OFFICE VISIT (OUTPATIENT)
Dept: ENT CLINIC | Age: 50
End: 2025-05-16
Payer: COMMERCIAL

## 2025-05-16 VITALS
OXYGEN SATURATION: 98 % | HEART RATE: 78 BPM | HEIGHT: 63 IN | SYSTOLIC BLOOD PRESSURE: 118 MMHG | BODY MASS INDEX: 25.02 KG/M2 | DIASTOLIC BLOOD PRESSURE: 77 MMHG | WEIGHT: 141.2 LBS | TEMPERATURE: 97.5 F

## 2025-05-16 DIAGNOSIS — J32.8 OTHER CHRONIC SINUSITIS: ICD-10-CM

## 2025-05-16 DIAGNOSIS — R09.82 POST-NASAL DRAINAGE: ICD-10-CM

## 2025-05-16 DIAGNOSIS — J34.3 NASAL TURBINATE HYPERTROPHY: ICD-10-CM

## 2025-05-16 DIAGNOSIS — R09.81 NASAL CONGESTION: ICD-10-CM

## 2025-05-16 DIAGNOSIS — E01.0 THYROMEGALY: Primary | ICD-10-CM

## 2025-05-16 PROCEDURE — 99213 OFFICE O/P EST LOW 20 MIN: CPT | Performed by: OTOLARYNGOLOGY

## 2025-05-16 RX ORDER — AZELASTINE 1 MG/ML
2 SPRAY, METERED NASAL DAILY
Qty: 1 EACH | Refills: 3 | Status: SHIPPED | OUTPATIENT
Start: 2025-05-16 | End: 2025-06-15

## 2025-05-16 RX ORDER — MUPIROCIN 20 MG/G
OINTMENT TOPICAL
Qty: 1 G | Refills: 0 | Status: SHIPPED | OUTPATIENT
Start: 2025-05-16

## 2025-05-16 ASSESSMENT — ENCOUNTER SYMPTOMS
SORE THROAT: 0
RESPIRATORY NEGATIVE: 1
RHINORRHEA: 0
ALLERGIC/IMMUNOLOGIC NEGATIVE: 1
SINUS PRESSURE: 1

## 2025-05-16 NOTE — PROGRESS NOTES
Department of Otolaryngology  Office Consult Note      Subjective:        Chief Complaint:  had concerns including Follow-up (FOLLOW UP - 1 mo Deviated septum/ sinusitis/).     Patient ID: Cb Corbett is a 49 y.o. female.    HPI: Patient presents as  follow-up for chronic sinusitis, septal deviation and Sjrogrens. She was started on saline astelin and zyrtec and has mild improvement of congestion but is now signficantly dry, has hard dried congestion inhibiting breathing through her nose.   No sinus infection since last visit which is improvement   Is on plaquenil for autoimmune disorder  She does have sjogrens disease   Following with hematology for leukocytosis - improving     Review of Systems   Constitutional: Negative.    HENT:  Positive for congestion, postnasal drip and sinus pressure. Negative for ear discharge, ear pain, hearing loss, rhinorrhea, sneezing and sore throat.    Respiratory: Negative.     Cardiovascular:  Negative for chest pain.   Musculoskeletal: Negative.    Skin: Negative.    Allergic/Immunologic: Negative.    Neurological: Negative.    Psychiatric/Behavioral: Negative.           Past Medical History:   Diagnosis Date    Bulging lumbar disc     Chronic back pain     Compression of nerve     Headache(784.0)     Insomnia     Tachycardia     takes metoprolol for irreg HR      Past Surgical History:   Procedure Laterality Date    BICEPS TENDON REPAIR Right     BREAST ENHANCEMENT SURGERY Bilateral     BREAST SURGERY  augmentation    COSMETIC SURGERY      HEMORROIDECTOMY N/A 05/07/2019    EXAM UNDER ANESTHESIA HEMORRHOIDECTOMY performed by Gonzalo Amezquita MD at [facility] OR    KNEE ARTHROSCOPY  right    MEDICATION INJECTION Left 07/26/2021    LEFT SACROILIAC JOINT INJECTION UNDER FLUOROSCOPY performed by Corey Dolan MD at [facility] OR    NERVE BLOCK Right 10/12/2015    lumbar right transforaminal nerve block #1 l4-5 l5-s1    NERVE BLOCK Right 10/22/2015    Transforaminal lumbar nerve block

## 2025-06-05 ENCOUNTER — TELEPHONE (OUTPATIENT)
Age: 50
End: 2025-06-05

## 2025-06-05 ENCOUNTER — PREP FOR PROCEDURE (OUTPATIENT)
Age: 50
End: 2025-06-05

## 2025-06-05 NOTE — TELEPHONE ENCOUNTER
Scheduled pt for colonoscopy with Dr. Jules on 9/25/25 at 930am. Pt needs to arrive at [facility] at 830am. Patient confirmed date and time for procedure. Address, directions, and prep instructions given via phone. Patient verbalized full understanding. Surgery letter mailed.      Electronically signed by Arelis Patten MA on 6/5/2025 at 3:13 PM

## 2025-06-06 ENCOUNTER — TELEMEDICINE (OUTPATIENT)
Dept: RHEUMATOLOGY | Age: 50
End: 2025-06-06
Payer: COMMERCIAL

## 2025-06-06 DIAGNOSIS — M35.01 SJOGREN'S SYNDROME WITH KERATOCONJUNCTIVITIS SICCA: ICD-10-CM

## 2025-06-06 DIAGNOSIS — M35.9 UNDIFFERENTIATED CONNECTIVE TISSUE DISEASE: Primary | ICD-10-CM

## 2025-06-06 DIAGNOSIS — M13.0 POLYARTHRITIS: ICD-10-CM

## 2025-06-06 DIAGNOSIS — Z79.899 HIGH RISK MEDICATION USE: ICD-10-CM

## 2025-06-06 PROCEDURE — G2211 COMPLEX E/M VISIT ADD ON: HCPCS | Performed by: INTERNAL MEDICINE

## 2025-06-06 PROCEDURE — 99214 OFFICE O/P EST MOD 30 MIN: CPT | Performed by: INTERNAL MEDICINE

## 2025-06-06 RX ORDER — HYDROXYCHLOROQUINE SULFATE 200 MG/1
300 TABLET, FILM COATED ORAL DAILY
Qty: 45 TABLET | Refills: 5 | Status: SHIPPED | OUTPATIENT
Start: 2025-06-06

## 2025-06-06 RX ORDER — CEVIMELINE HYDROCHLORIDE 30 MG/1
30 CAPSULE ORAL 3 TIMES DAILY
Qty: 90 CAPSULE | Refills: 5 | Status: SHIPPED | OUTPATIENT
Start: 2025-06-06

## 2025-06-06 ASSESSMENT — ENCOUNTER SYMPTOMS
VOMITING: 0
ABDOMINAL PAIN: 0
NAUSEA: 0
DIARRHEA: 0
COUGH: 0
BACK PAIN: 1
COLOR CHANGE: 1
TROUBLE SWALLOWING: 0

## 2025-06-06 NOTE — PROGRESS NOTES
her Plaquenil to 200 mg daily because of nausea.  She feels like she has now adjusted to it and is having some joint pain and rash still so we will bump that back up to 300 mg daily.  Last visit we also started her on cevimeline which has been beneficial for dry mouth.  It actually causes excessive salivation at night so she is only taking it twice daily.      Initial history: Patient was seen by oncology for leukocytosis.  As part of that workup she had an LILI done which came back positive at 1: 640 and a centromere pattern.  Her white count is since come back to normal and workup from that and was otherwise unrevealing.  She does however complain of a lot of diffuse pain for the last 8 years.  She had seen pain management in the past and had epidural steroid injections and ablations which were not all that beneficial.  Aside from the spine she has pain in the hips, knees, ankles, shoulders.  She states that she does get a little bit of pain and swelling in the hands as well.  There is no real pattern to the pain.  She states at baseline she has 4 out of 10 pain but then has other days where even her toenails hurt.  She does take Advil which takes the edge off.  She has tried prescription anti-inflammatories which were not really any better than Advil.  She states that she is very sensitive to the sun.  She does get a rash around where she wears her boots which she does show me a picture of.  She feels like she may get an rash on the face at times.  She has had Raynaud's for at least 4 years without digital pitting or ulceration.  She does have some hair thinning without bald spots.  She will occasionally have nonexertional chest pain.  [facility] has been [facility] for hole in the right retina apparently.    Past Medical History:   Diagnosis Date    Bulging lumbar disc     Chronic back pain     Compression of nerve     Headache(784.0)     Insomnia     Tachycardia     takes metoprolol for irreg HR

## 2025-06-19 ENCOUNTER — HOSPITAL ENCOUNTER (OUTPATIENT)
Age: 50
Discharge: HOME | End: 2025-06-19
Payer: COMMERCIAL

## 2025-06-19 ENCOUNTER — RESULTS FOLLOW-UP (OUTPATIENT)
Dept: RHEUMATOLOGY | Age: 50
End: 2025-06-19

## 2025-06-19 DIAGNOSIS — M35.9 UNDIFFERENTIATED CONNECTIVE TISSUE DISEASE: ICD-10-CM

## 2025-06-19 DIAGNOSIS — Z79.899 HIGH RISK MEDICATION USE: ICD-10-CM

## 2025-06-19 DIAGNOSIS — M35.01 SJOGREN'S SYNDROME WITH KERATOCONJUNCTIVITIS SICCA: ICD-10-CM

## 2025-06-19 LAB
ALBUMIN SERPL-MCNC: 4.3 G/DL (ref 3.5–5.2)
ALP SERPL-CCNC: 86 U/L (ref 35–104)
ALT SERPL-CCNC: 8 U/L (ref 0–35)
ANION GAP SERPL CALCULATED.3IONS-SCNC: 11 MMOL/L (ref 7–16)
AST SERPL-CCNC: 18 U/L (ref 0–35)
BASOPHILS # BLD: 0.1 K/UL (ref 0–0.2)
BASOPHILS NFR BLD: 1 % (ref 0–2)
BILIRUB SERPL-MCNC: 0.3 MG/DL (ref 0–1.2)
BILIRUB UR QL STRIP: NEGATIVE
BUN SERPL-MCNC: 8 MG/DL (ref 6–20)
C3 SERPL-MCNC: 157 MG/DL (ref 90–180)
C4 SERPL-MCNC: 23 MG/DL (ref 10–40)
CALCIUM SERPL-MCNC: 9.6 MG/DL (ref 8.6–10)
CHLORIDE SERPL-SCNC: 105 MMOL/L (ref 98–107)
CLARITY UR: CLEAR
CO2 SERPL-SCNC: 24 MMOL/L (ref 22–29)
COLOR UR: YELLOW
CREAT SERPL-MCNC: 0.8 MG/DL (ref 0.5–1)
CREAT UR-MCNC: 78.3 MG/DL (ref 29–226)
EOSINOPHIL # BLD: 0.4 K/UL (ref 0.05–0.5)
EOSINOPHILS RELATIVE PERCENT: 4 % (ref 0–6)
ERYTHROCYTE [DISTWIDTH] IN BLOOD BY AUTOMATED COUNT: 13 % (ref 11.5–15)
GFR, ESTIMATED: >90 ML/MIN/1.73M2
GLUCOSE SERPL-MCNC: 101 MG/DL (ref 74–99)
GLUCOSE UR STRIP-MCNC: NEGATIVE MG/DL
HCT VFR BLD AUTO: 43.3 % (ref 34–48)
HGB BLD-MCNC: 14.5 G/DL (ref 11.5–15.5)
HGB UR QL STRIP.AUTO: ABNORMAL
IMM GRANULOCYTES # BLD AUTO: 0.08 K/UL (ref 0–0.58)
IMM GRANULOCYTES NFR BLD: 1 % (ref 0–5)
KETONES UR STRIP-MCNC: NEGATIVE MG/DL
LEUKOCYTE ESTERASE UR QL STRIP: NEGATIVE
LYMPHOCYTES NFR BLD: 2.19 K/UL (ref 1.5–4)
LYMPHOCYTES RELATIVE PERCENT: 21 % (ref 20–42)
MCH RBC QN AUTO: 31.1 PG (ref 26–35)
MCHC RBC AUTO-ENTMCNC: 33.5 G/DL (ref 32–34.5)
MCV RBC AUTO: 92.9 FL (ref 80–99.9)
MONOCYTES NFR BLD: 0.63 K/UL (ref 0.1–0.95)
MONOCYTES NFR BLD: 6 % (ref 2–12)
NEUTROPHILS NFR BLD: 67 % (ref 43–80)
NEUTS SEG NFR BLD: 6.91 K/UL (ref 1.8–7.3)
NITRITE UR QL STRIP: NEGATIVE
PH UR STRIP: 6 [PH] (ref 5–8)
PLATELET # BLD AUTO: 363 K/UL (ref 130–450)
PMV BLD AUTO: 8.3 FL (ref 7–12)
POTASSIUM SERPL-SCNC: 4.5 MMOL/L (ref 3.5–5.1)
PROT SERPL-MCNC: 7.3 G/DL (ref 6.4–8.3)
PROT UR STRIP-MCNC: NEGATIVE MG/DL
RBC # BLD AUTO: 4.66 M/UL (ref 3.5–5.5)
RBC #/AREA URNS HPF: NORMAL /HPF
SODIUM SERPL-SCNC: 140 MMOL/L (ref 136–145)
SP GR UR STRIP: 1.01 (ref 1–1.03)
TOTAL PROTEIN, URINE: 7 MG/DL (ref 0–12)
URINE TOTAL PROTEIN CREATININE RATIO: 0.09 (ref 0–0.2)
UROBILINOGEN UR STRIP-ACNC: 0.2 EU/DL (ref 0–1)
WBC #/AREA URNS HPF: NORMAL /HPF
WBC OTHER # BLD: 10.3 K/UL (ref 4.5–11.5)

## 2025-06-19 PROCEDURE — 86225 DNA ANTIBODY NATIVE: CPT

## 2025-06-19 PROCEDURE — 81001 URINALYSIS AUTO W/SCOPE: CPT

## 2025-06-19 PROCEDURE — 86160 COMPLEMENT ANTIGEN: CPT

## 2025-06-19 PROCEDURE — 80053 COMPREHEN METABOLIC PANEL: CPT

## 2025-06-19 PROCEDURE — 36415 COLL VENOUS BLD VENIPUNCTURE: CPT

## 2025-06-19 PROCEDURE — 82570 ASSAY OF URINE CREATININE: CPT

## 2025-06-19 PROCEDURE — 85025 COMPLETE CBC W/AUTO DIFF WBC: CPT

## 2025-06-19 PROCEDURE — 84156 ASSAY OF PROTEIN URINE: CPT

## 2025-06-20 LAB — ANTI DNA DOUBLE STRANDED: NEGATIVE

## 2025-07-09 ENCOUNTER — OFFICE VISIT (OUTPATIENT)
Dept: ENT CLINIC | Age: 50
End: 2025-07-09
Payer: COMMERCIAL

## 2025-07-09 VITALS
DIASTOLIC BLOOD PRESSURE: 82 MMHG | SYSTOLIC BLOOD PRESSURE: 152 MMHG | WEIGHT: 140.8 LBS | HEIGHT: 63 IN | OXYGEN SATURATION: 98 % | HEART RATE: 84 BPM | BODY MASS INDEX: 24.95 KG/M2 | TEMPERATURE: 97.7 F

## 2025-07-09 DIAGNOSIS — R09.82 POST-NASAL DRAINAGE: ICD-10-CM

## 2025-07-09 DIAGNOSIS — R09.81 NASAL CONGESTION: ICD-10-CM

## 2025-07-09 DIAGNOSIS — J34.2 NASAL SEPTAL DEVIATION: ICD-10-CM

## 2025-07-09 DIAGNOSIS — J34.3 NASAL TURBINATE HYPERTROPHY: Primary | ICD-10-CM

## 2025-07-09 PROCEDURE — 99213 OFFICE O/P EST LOW 20 MIN: CPT | Performed by: OTOLARYNGOLOGY

## 2025-07-09 ASSESSMENT — ENCOUNTER SYMPTOMS
ALLERGIC/IMMUNOLOGIC NEGATIVE: 1
SORE THROAT: 0
RESPIRATORY NEGATIVE: 1
RHINORRHEA: 0
SINUS PRESSURE: 1

## 2025-07-09 NOTE — PROGRESS NOTES
Department of Otolaryngology  Office Consult Note      Subjective:        Chief Complaint:  had concerns including Follow-up (6 week follow up sinus patient states no improvement).     Patient ID: Cb Corbett is a 49 y.o. female.    HPI: Patient presents as  follow-up for chronic sinusitis, septal deviation and Sjrogrens. She was started on saline astelin and bactroban rinse. She has had some improvement but still pressure, nasal obstruction and right swelling on the right nose.     Feels when she wears her glasses it contributes to some swelling on the right lateral nasal wall     No sinus infection since last visit    Is on plaquenil for autoimmune disorder  She does have sjogrens disease   Following with hematology for leukocytosis - improving     Review of Systems   Constitutional: Negative.    HENT:  Positive for congestion, postnasal drip and sinus pressure. Negative for ear discharge, ear pain, hearing loss, rhinorrhea, sneezing and sore throat.    Respiratory: Negative.     Cardiovascular:  Negative for chest pain.   Musculoskeletal: Negative.    Skin: Negative.    Allergic/Immunologic: Negative.    Neurological: Negative.    Psychiatric/Behavioral: Negative.     All other systems reviewed and are negative.        Past Medical History:   Diagnosis Date    Bulging lumbar disc     Chronic back pain     Compression of nerve     Headache(784.0)     Insomnia     Tachycardia     takes metoprolol for irreg HR      Past Surgical History:   Procedure Laterality Date    BICEPS TENDON REPAIR Right     BREAST ENHANCEMENT SURGERY Bilateral     BREAST SURGERY  augmentation    COSMETIC SURGERY      HEMORROIDECTOMY N/A 05/07/2019    EXAM UNDER ANESTHESIA HEMORRHOIDECTOMY performed by Gonzalo Amezquita MD at [facility] OR    KNEE ARTHROSCOPY  right    MEDICATION INJECTION Left 07/26/2021    LEFT SACROILIAC JOINT INJECTION UNDER FLUOROSCOPY performed by Corey Dolan MD at [facility] OR    NERVE BLOCK Right 10/12/2015

## 2025-07-12 ENCOUNTER — PATIENT MESSAGE (OUTPATIENT)
Dept: ENT CLINIC | Age: 50
End: 2025-07-12

## 2025-07-14 RX ORDER — AMOXICILLIN 500 MG/1
500 CAPSULE ORAL 2 TIMES DAILY
Qty: 14 CAPSULE | Refills: 0 | Status: SHIPPED | OUTPATIENT
Start: 2025-07-14 | End: 2025-07-21

## 2025-08-29 ENCOUNTER — OFFICE VISIT (OUTPATIENT)
Dept: ENT CLINIC | Age: 50
End: 2025-08-29
Payer: COMMERCIAL

## 2025-08-29 VITALS
BODY MASS INDEX: 26.31 KG/M2 | HEART RATE: 82 BPM | WEIGHT: 143 LBS | HEIGHT: 62 IN | SYSTOLIC BLOOD PRESSURE: 122 MMHG | TEMPERATURE: 98 F | OXYGEN SATURATION: 100 % | DIASTOLIC BLOOD PRESSURE: 73 MMHG

## 2025-08-29 DIAGNOSIS — J34.2 NASAL SEPTAL DEVIATION: ICD-10-CM

## 2025-08-29 DIAGNOSIS — R09.82 POST-NASAL DRAINAGE: ICD-10-CM

## 2025-08-29 DIAGNOSIS — J32.8 OTHER CHRONIC SINUSITIS: ICD-10-CM

## 2025-08-29 DIAGNOSIS — J34.3 NASAL TURBINATE HYPERTROPHY: Primary | ICD-10-CM

## 2025-08-29 DIAGNOSIS — M35.00: ICD-10-CM

## 2025-08-29 DIAGNOSIS — R09.81 NASAL CONGESTION: ICD-10-CM

## 2025-08-29 PROCEDURE — 99213 OFFICE O/P EST LOW 20 MIN: CPT | Performed by: OTOLARYNGOLOGY

## 2025-08-29 RX ORDER — BUDESONIDE 0.5 MG/2ML
1 INHALANT ORAL 2 TIMES DAILY
Qty: 60 EACH | Refills: 3 | Status: SHIPPED | OUTPATIENT
Start: 2025-08-29

## 2025-08-29 RX ORDER — MONTELUKAST SODIUM 10 MG/1
10 TABLET ORAL DAILY
Qty: 30 TABLET | Refills: 3 | Status: SHIPPED | OUTPATIENT
Start: 2025-08-29

## 2025-08-29 ASSESSMENT — ENCOUNTER SYMPTOMS
RHINORRHEA: 0
ALLERGIC/IMMUNOLOGIC NEGATIVE: 1
SINUS PRESSURE: 1
SORE THROAT: 0
RESPIRATORY NEGATIVE: 1

## (undated) DEVICE — GARMENT,MEDLINE,DVT,INT,CALF,MED, GEN2: Brand: MEDLINE

## (undated) DEVICE — 6 ML SYRINGE LUER-LOCK TIP: Brand: MONOJECT

## (undated) DEVICE — GAUZE,SPONGE,4"X4",12PLY,STERILE,LF,2'S: Brand: MEDLINE

## (undated) DEVICE — BANDAGE ADH W1XL3IN NAT FAB WVN FLX DURABLE N ADH PD SEAL

## (undated) DEVICE — SYRINGE, LUER LOCK, 10ML: Brand: MEDLINE

## (undated) DEVICE — 3M™ RED DOT™ MONITORING ELECTRODE WITH FOAM TAPE AND STICKY GEL 2560, 50/BAG, 20/CASE, 72/PLT: Brand: RED DOT™

## (undated) DEVICE — COVER,LIGHT HANDLE,FLX,1/PK: Brand: MEDLINE INDUSTRIES, INC.

## (undated) DEVICE — 3 ML SYRINGE LUER-LOCK TIP: Brand: MONOJECT

## (undated) DEVICE — DOUBLE BASIN SET: Brand: MEDLINE INDUSTRIES, INC.

## (undated) DEVICE — APPLICATOR MEDICATED 10.5 CC SOLUTION HI LT ORNG CHLORAPREP

## (undated) DEVICE — CVD CANNULA

## (undated) DEVICE — SOLUTION IV IRRIG POUR BRL 0.9% SODIUM CHL 2F7124

## (undated) DEVICE — SPONGE,LAP,12"X12",XR,ST,5/PK,40PK/CS: Brand: MEDLINE

## (undated) DEVICE — NEEDLE SPNL 25GA L3.5IN BLU HUB S STL REG WALL FIT STYL W/

## (undated) DEVICE — Device: Brand: PORTEX

## (undated) DEVICE — 12 ML SYRINGE,LUER-LOCK TIP: Brand: MONOJECT

## (undated) DEVICE — GLOVE ORANGE PI 7   MSG9070

## (undated) DEVICE — Z DISCONTINUED APPLICATOR SURG PREP 0.35OZ 2% CHG 70% ISO ALC W/ HI LT

## (undated) DEVICE — GLOVE ORANGE PI 7 1/2   MSG9075

## (undated) DEVICE — PENCIL ES L3M BTTN SWCH HOLSTER W/ BLDE ELECTRD EDGE

## (undated) DEVICE — NEEDLE HYPO 18GA L1.5IN PNK POLYPR HUB S STL THN WALL FILL

## (undated) DEVICE — 22GX5" WHITACRE SPINAL NEEDLE: Brand: MEDLINE

## (undated) DEVICE — SYRINGE, LUER LOCK, 5ML: Brand: MEDLINE

## (undated) DEVICE — ELECTRODE SURG MPLR NEUT SELF ADH PT PLT MULTIGEN

## (undated) DEVICE — NEEDLE HYPO 25GA L1.5IN BLU POLYPR HUB S STL REG BVL STR

## (undated) DEVICE — PACK,LAPAROTOMY,NO GOWNS: Brand: MEDLINE

## (undated) DEVICE — TOWEL OR BLUEE 16X26IN ST 8 PACK ORB08 16X26ORTWL

## (undated) DEVICE — GOWN,SIRUS,NONRNF,SETINSLV,XL,20/CS: Brand: MEDLINE

## (undated) DEVICE — NON-DEHP CATHETER EXTENSION SET, MALE LUER LOCK ADAPTER

## (undated) DEVICE — SOLUTION SCRB 32OZ 7.5% POVIDONE IOD BTL GENTLE EFFECTIVE

## (undated) DEVICE — GARMENT COMPR STD FOR 17IN CALF UNIF THER FLOTRN

## (undated) DEVICE — STERILE LATEX POWDER-FREE SURGICAL GLOVESWITH NITRILE COATING: Brand: PROTEXIS

## (undated) DEVICE — READY WET SKIN SCRUB TRAY-LF: Brand: MEDLINE INDUSTRIES, INC.

## (undated) DEVICE — TOWEL,OR,DSP,ST,BLUE,STD,6/PK,12PK/CS: Brand: MEDLINE

## (undated) DEVICE — INTENDED FOR TISSUE SEPARATION, AND OTHER PROCEDURES THAT REQUIRE A SHARP SURGICAL BLADE TO PUNCTURE OR CUT.: Brand: BARD-PARKER ® STAINLESS STEEL BLADES

## (undated) DEVICE — COUNTER NDL 10 COUNT HLD 20 FOAM BLK SGL MAG

## (undated) DEVICE — MARKER,SKIN,WI/RULER AND LABELS: Brand: MEDLINE

## (undated) DEVICE — YANKAUER,BULB TIP,W/O VENT,RIGID,STERILE: Brand: MEDLINE

## (undated) DEVICE — PAD,ABDOMINAL,5"X9",ST,LF,25/BX: Brand: MEDLINE INDUSTRIES, INC.

## (undated) DEVICE — SPONGE ABSORBABLE HEMORRHOIDECTOMY 8X3 CM GEL SURGFOAM

## (undated) DEVICE — GAUZE,SPONGE,4"X4",16PLY,STRL,LF,10/TRAY: Brand: MEDLINE

## (undated) DEVICE — NDL CNTR 40CT FM MAG: Brand: MEDLINE INDUSTRIES, INC.

## (undated) DEVICE — STANDARD HYPODERMIC NEEDLE,POLYPROPYLENE HUB: Brand: MONOJECT

## (undated) DEVICE — TUBING, SUCTION, 1/4" X 10', STRAIGHT: Brand: MEDLINE

## (undated) DEVICE — TRAY PROCED CUSTOM RECTAL

## (undated) DEVICE — SPONGE GZ W4XL4IN RAYON POLY FILL CVR W/ NONWOVEN FAB

## (undated) DEVICE — SHEARS ENDOSCP L9CM CRV HARM FOCS +

## (undated) DEVICE — NDL, WHITACRE SPINAL, 22GX3.5": Brand: MEDLINE

## (undated) DEVICE — PATIENT RETURN ELECTRODE, SINGLE-USE, CONTACT QUALITY MONITORING, ADULT, WITH 9FT CORD, FOR PATIENTS WEIGING OVER 33LBS. (15KG): Brand: MEGADYNE

## (undated) DEVICE — SOLUTION IRRIG 1500ML 0.9% SOD CHL USP POUR PLAS BTL

## (undated) DEVICE — BLADE ES ELASTOMERIC COAT INSUL DURABLE BEND UPTO 90DEG

## (undated) DEVICE — SOLUTION SURG PREP POV IOD 7.5% 4 OZ